# Patient Record
Sex: MALE | Race: WHITE | NOT HISPANIC OR LATINO | Employment: OTHER | URBAN - NONMETROPOLITAN AREA
[De-identification: names, ages, dates, MRNs, and addresses within clinical notes are randomized per-mention and may not be internally consistent; named-entity substitution may affect disease eponyms.]

---

## 2019-06-15 ENCOUNTER — HOSPITAL ENCOUNTER (INPATIENT)
Facility: HOSPITAL | Age: 79
LOS: 9 days | Discharge: NURSING, REHAB OR LONG-TERM CARE | End: 2019-06-24
Attending: NEUROLOGICAL SURGERY | Admitting: NEUROLOGICAL SURGERY

## 2019-06-15 ENCOUNTER — PREP FOR SURGERY (OUTPATIENT)
Dept: OTHER | Facility: HOSPITAL | Age: 79
End: 2019-06-15

## 2019-06-15 DIAGNOSIS — S06.5XAA BILATERAL SUBDURAL HEMATOMAS (HCC): Primary | ICD-10-CM

## 2019-06-15 DIAGNOSIS — Z78.9 DECREASED ACTIVITIES OF DAILY LIVING (ADL): ICD-10-CM

## 2019-06-15 DIAGNOSIS — Z74.09 IMPAIRED PHYSICAL MOBILITY: ICD-10-CM

## 2019-06-15 LAB
ABO GROUP BLD: NORMAL
ALBUMIN SERPL-MCNC: 4.4 G/DL (ref 3.5–5)
ALBUMIN/GLOB SERPL: 1.5 G/DL (ref 1.1–2.5)
ALP SERPL-CCNC: 85 U/L (ref 24–120)
ALT SERPL W P-5'-P-CCNC: 23 U/L (ref 0–54)
ANION GAP SERPL CALCULATED.3IONS-SCNC: 10 MMOL/L (ref 4–13)
APTT PPP: 31.5 SECONDS (ref 24.1–35)
AST SERPL-CCNC: 31 U/L (ref 7–45)
BILIRUB SERPL-MCNC: 0.6 MG/DL (ref 0.1–1)
BLD GP AB SCN SERPL QL: NEGATIVE
BUN BLD-MCNC: 18 MG/DL (ref 5–21)
BUN/CREAT SERPL: 27.7 (ref 7–25)
CALCIUM SPEC-SCNC: 9.2 MG/DL (ref 8.4–10.4)
CHLORIDE SERPL-SCNC: 94 MMOL/L (ref 98–110)
CO2 SERPL-SCNC: 26 MMOL/L (ref 24–31)
CREAT BLD-MCNC: 0.65 MG/DL (ref 0.5–1.4)
DEPRECATED RDW RBC AUTO: 43.1 FL (ref 40–54)
ERYTHROCYTE [DISTWIDTH] IN BLOOD BY AUTOMATED COUNT: 13.2 % (ref 12–15)
GFR SERPL CREATININE-BSD FRML MDRD: 118 ML/MIN/1.73
GLOBULIN UR ELPH-MCNC: 3 GM/DL
GLUCOSE BLD-MCNC: 120 MG/DL (ref 70–100)
HCT VFR BLD AUTO: 44.2 % (ref 40–52)
HGB BLD-MCNC: 14.6 G/DL (ref 14–18)
INR PPP: 1.1 (ref 0.91–1.09)
MCH RBC QN AUTO: 29.5 PG (ref 28–32)
MCHC RBC AUTO-ENTMCNC: 33 G/DL (ref 33–36)
MCV RBC AUTO: 89.3 FL (ref 82–95)
PLATELET # BLD AUTO: 223 10*3/MM3 (ref 130–400)
PMV BLD AUTO: 8.9 FL (ref 6–12)
POTASSIUM BLD-SCNC: 4.7 MMOL/L (ref 3.5–5.3)
PROT SERPL-MCNC: 7.4 G/DL (ref 6.3–8.7)
PROTHROMBIN TIME: 14.6 SECONDS (ref 11.9–14.6)
RBC # BLD AUTO: 4.95 10*6/MM3 (ref 4.8–5.9)
RH BLD: POSITIVE
SODIUM BLD-SCNC: 130 MMOL/L (ref 135–145)
T&S EXPIRATION DATE: NORMAL
WBC NRBC COR # BLD: 9.42 10*3/MM3 (ref 4.8–10.8)

## 2019-06-15 PROCEDURE — 86901 BLOOD TYPING SEROLOGIC RH(D): CPT | Performed by: NEUROLOGICAL SURGERY

## 2019-06-15 PROCEDURE — 99223 1ST HOSP IP/OBS HIGH 75: CPT | Performed by: NEUROLOGICAL SURGERY

## 2019-06-15 PROCEDURE — 80053 COMPREHEN METABOLIC PANEL: CPT | Performed by: NEUROLOGICAL SURGERY

## 2019-06-15 PROCEDURE — 94799 UNLISTED PULMONARY SVC/PX: CPT

## 2019-06-15 PROCEDURE — 86850 RBC ANTIBODY SCREEN: CPT | Performed by: NEUROLOGICAL SURGERY

## 2019-06-15 PROCEDURE — 85610 PROTHROMBIN TIME: CPT | Performed by: NEUROLOGICAL SURGERY

## 2019-06-15 PROCEDURE — 25010000002 MORPHINE PER 10 MG: Performed by: NEUROLOGICAL SURGERY

## 2019-06-15 PROCEDURE — 85730 THROMBOPLASTIN TIME PARTIAL: CPT | Performed by: NEUROLOGICAL SURGERY

## 2019-06-15 PROCEDURE — 86900 BLOOD TYPING SEROLOGIC ABO: CPT | Performed by: NEUROLOGICAL SURGERY

## 2019-06-15 PROCEDURE — 85027 COMPLETE CBC AUTOMATED: CPT | Performed by: NEUROLOGICAL SURGERY

## 2019-06-15 PROCEDURE — 25010000002 ONDANSETRON PER 1 MG: Performed by: NEUROLOGICAL SURGERY

## 2019-06-15 RX ORDER — SODIUM CHLORIDE 0.9 % (FLUSH) 0.9 %
3 SYRINGE (ML) INJECTION EVERY 12 HOURS SCHEDULED
Status: DISCONTINUED | OUTPATIENT
Start: 2019-06-15 | End: 2019-06-24 | Stop reason: HOSPADM

## 2019-06-15 RX ORDER — SODIUM CHLORIDE 0.9 % (FLUSH) 0.9 %
3-10 SYRINGE (ML) INJECTION AS NEEDED
Status: DISCONTINUED | OUTPATIENT
Start: 2019-06-15 | End: 2019-06-24 | Stop reason: HOSPADM

## 2019-06-15 RX ORDER — ONDANSETRON 2 MG/ML
4 INJECTION INTRAMUSCULAR; INTRAVENOUS EVERY 6 HOURS PRN
Status: DISCONTINUED | OUTPATIENT
Start: 2019-06-15 | End: 2019-06-24 | Stop reason: HOSPADM

## 2019-06-15 RX ORDER — MORPHINE SULFATE 2 MG/ML
2 INJECTION, SOLUTION INTRAMUSCULAR; INTRAVENOUS EVERY 4 HOURS PRN
Status: DISCONTINUED | OUTPATIENT
Start: 2019-06-15 | End: 2019-06-17

## 2019-06-15 RX ORDER — HYDROCODONE BITARTRATE AND ACETAMINOPHEN 5; 325 MG/1; MG/1
1 TABLET ORAL EVERY 6 HOURS PRN
Status: DISCONTINUED | OUTPATIENT
Start: 2019-06-15 | End: 2019-06-17

## 2019-06-15 RX ADMIN — SODIUM CHLORIDE, PRESERVATIVE FREE 3 ML: 5 INJECTION INTRAVENOUS at 14:00

## 2019-06-15 RX ADMIN — HYDROCODONE BITARTRATE AND ACETAMINOPHEN 1 TABLET: 5; 325 TABLET ORAL at 13:12

## 2019-06-15 RX ADMIN — MORPHINE SULFATE 2 MG: 2 INJECTION, SOLUTION INTRAMUSCULAR; INTRAVENOUS at 18:30

## 2019-06-15 RX ADMIN — ONDANSETRON 4 MG: 2 SOLUTION INTRAMUSCULAR; INTRAVENOUS at 21:22
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
## 2019-06-16 ENCOUNTER — ANESTHESIA (OUTPATIENT)
Dept: PERIOP | Facility: HOSPITAL | Age: 79
End: 2019-06-16

## 2019-06-16 ENCOUNTER — ANESTHESIA EVENT (OUTPATIENT)
Dept: PERIOP | Facility: HOSPITAL | Age: 79
End: 2019-06-16

## 2019-06-16 PROCEDURE — 25010000002 ONDANSETRON PER 1 MG: Performed by: NEUROLOGICAL SURGERY

## 2019-06-16 PROCEDURE — 25010000002 MIDAZOLAM PER 1 MG

## 2019-06-16 PROCEDURE — C1713 ANCHOR/SCREW BN/BN,TIS/BN: HCPCS | Performed by: NEUROLOGICAL SURGERY

## 2019-06-16 PROCEDURE — 93005 ELECTROCARDIOGRAM TRACING: CPT | Performed by: NURSE ANESTHETIST, CERTIFIED REGISTERED

## 2019-06-16 PROCEDURE — 25010000002 DEXAMETHASONE PER 1 MG: Performed by: NURSE ANESTHETIST, CERTIFIED REGISTERED

## 2019-06-16 PROCEDURE — 25010000002 PROPOFOL 10 MG/ML EMULSION: Performed by: NURSE ANESTHETIST, CERTIFIED REGISTERED

## 2019-06-16 PROCEDURE — 99024 POSTOP FOLLOW-UP VISIT: CPT | Performed by: NEUROLOGICAL SURGERY

## 2019-06-16 PROCEDURE — 25010000003 CEFAZOLIN PER 500 MG: Performed by: NURSE ANESTHETIST, CERTIFIED REGISTERED

## 2019-06-16 PROCEDURE — 25010000002 MORPHINE PER 10 MG: Performed by: NEUROLOGICAL SURGERY

## 2019-06-16 PROCEDURE — 25010000002 NEOSTIGMINE PER 0.5 MG: Performed by: NURSE ANESTHETIST, CERTIFIED REGISTERED

## 2019-06-16 PROCEDURE — 25010000002 ONDANSETRON PER 1 MG: Performed by: NURSE ANESTHETIST, CERTIFIED REGISTERED

## 2019-06-16 PROCEDURE — 61312 CRNEC/CRNOT STTL XDRL/SDRL: CPT | Performed by: NEUROLOGICAL SURGERY

## 2019-06-16 PROCEDURE — 93010 ELECTROCARDIOGRAM REPORT: CPT | Performed by: INTERNAL MEDICINE

## 2019-06-16 PROCEDURE — 00C40ZZ EXTIRPATION OF MATTER FROM INTRACRANIAL SUBDURAL SPACE, OPEN APPROACH: ICD-10-PCS | Performed by: NEUROLOGICAL SURGERY

## 2019-06-16 DEVICE — IMPLANTABLE DEVICE
Type: IMPLANTABLE DEVICE | Status: FUNCTIONAL
Brand: THINFLAP SYSTEM

## 2019-06-16 DEVICE — IMPLANTABLE DEVICE
Type: IMPLANTABLE DEVICE | Status: FUNCTIONAL
Brand: THINFLAP

## 2019-06-16 DEVICE — DURAGEN® PLUS DURAL REGENERATION MATRIX, 3 IN X 3 IN (7.5 CM X 7.5 CM)
Type: IMPLANTABLE DEVICE | Status: FUNCTIONAL
Brand: DURAGEN® PLUS

## 2019-06-16 RX ORDER — FLUMAZENIL 0.1 MG/ML
0.2 INJECTION INTRAVENOUS AS NEEDED
Status: DISCONTINUED | OUTPATIENT
Start: 2019-06-16 | End: 2019-06-16 | Stop reason: HOSPADM

## 2019-06-16 RX ORDER — MIDAZOLAM HYDROCHLORIDE 1 MG/ML
INJECTION INTRAMUSCULAR; INTRAVENOUS
Status: COMPLETED
Start: 2019-06-16 | End: 2019-06-16

## 2019-06-16 RX ORDER — NICARDIPINE HYDROCHLORIDE 2.5 MG/ML
INJECTION INTRAVENOUS AS NEEDED
Status: DISCONTINUED | OUTPATIENT
Start: 2019-06-16 | End: 2019-06-16 | Stop reason: SURG

## 2019-06-16 RX ORDER — PROPOFOL 10 MG/ML
VIAL (ML) INTRAVENOUS AS NEEDED
Status: DISCONTINUED | OUTPATIENT
Start: 2019-06-16 | End: 2019-06-16 | Stop reason: SURG

## 2019-06-16 RX ORDER — GLYCOPYRROLATE 0.2 MG/ML
INJECTION INTRAMUSCULAR; INTRAVENOUS AS NEEDED
Status: DISCONTINUED | OUTPATIENT
Start: 2019-06-16 | End: 2019-06-16 | Stop reason: SURG

## 2019-06-16 RX ORDER — MORPHINE SULFATE 2 MG/ML
2 INJECTION, SOLUTION INTRAMUSCULAR; INTRAVENOUS
Status: DISCONTINUED | OUTPATIENT
Start: 2019-06-16 | End: 2019-06-16 | Stop reason: HOSPADM

## 2019-06-16 RX ORDER — ROCURONIUM BROMIDE 10 MG/ML
INJECTION, SOLUTION INTRAVENOUS AS NEEDED
Status: DISCONTINUED | OUTPATIENT
Start: 2019-06-16 | End: 2019-06-16 | Stop reason: SURG

## 2019-06-16 RX ORDER — LORAZEPAM 2 MG/ML
INJECTION INTRAMUSCULAR
Status: DISPENSED
Start: 2019-06-16 | End: 2019-06-16

## 2019-06-16 RX ORDER — MAGNESIUM HYDROXIDE 1200 MG/15ML
LIQUID ORAL AS NEEDED
Status: DISCONTINUED | OUTPATIENT
Start: 2019-06-16 | End: 2019-06-16 | Stop reason: HOSPADM

## 2019-06-16 RX ORDER — BUPIVACAINE HYDROCHLORIDE AND EPINEPHRINE 2.5; 5 MG/ML; UG/ML
INJECTION, SOLUTION INFILTRATION; PERINEURAL AS NEEDED
Status: DISCONTINUED | OUTPATIENT
Start: 2019-06-16 | End: 2019-06-16 | Stop reason: HOSPADM

## 2019-06-16 RX ORDER — ONDANSETRON 2 MG/ML
INJECTION INTRAMUSCULAR; INTRAVENOUS AS NEEDED
Status: DISCONTINUED | OUTPATIENT
Start: 2019-06-16 | End: 2019-06-16 | Stop reason: SURG

## 2019-06-16 RX ORDER — NALOXONE HCL 0.4 MG/ML
0.04 VIAL (ML) INJECTION AS NEEDED
Status: DISCONTINUED | OUTPATIENT
Start: 2019-06-16 | End: 2019-06-16 | Stop reason: HOSPADM

## 2019-06-16 RX ORDER — LORAZEPAM 2 MG/ML
0.5 INJECTION INTRAMUSCULAR ONCE
Status: DISCONTINUED | OUTPATIENT
Start: 2019-06-16 | End: 2019-06-24 | Stop reason: HOSPADM

## 2019-06-16 RX ORDER — BACITRACIN 50000 [IU]/1
INJECTION, POWDER, FOR SOLUTION INTRAMUSCULAR
Status: DISPENSED
Start: 2019-06-16 | End: 2019-06-16

## 2019-06-16 RX ORDER — MIDAZOLAM HYDROCHLORIDE 1 MG/ML
1 INJECTION INTRAMUSCULAR; INTRAVENOUS ONCE
Status: COMPLETED | OUTPATIENT
Start: 2019-06-16 | End: 2019-06-16

## 2019-06-16 RX ORDER — METOCLOPRAMIDE HYDROCHLORIDE 5 MG/ML
5 INJECTION INTRAMUSCULAR; INTRAVENOUS
Status: DISCONTINUED | OUTPATIENT
Start: 2019-06-16 | End: 2019-06-16 | Stop reason: HOSPADM

## 2019-06-16 RX ORDER — ONDANSETRON 2 MG/ML
4 INJECTION INTRAMUSCULAR; INTRAVENOUS AS NEEDED
Status: DISCONTINUED | OUTPATIENT
Start: 2019-06-16 | End: 2019-06-16 | Stop reason: HOSPADM

## 2019-06-16 RX ORDER — SODIUM CHLORIDE, SODIUM LACTATE, POTASSIUM CHLORIDE, CALCIUM CHLORIDE 600; 310; 30; 20 MG/100ML; MG/100ML; MG/100ML; MG/100ML
INJECTION, SOLUTION INTRAVENOUS CONTINUOUS PRN
Status: DISCONTINUED | OUTPATIENT
Start: 2019-06-16 | End: 2019-06-16 | Stop reason: SURG

## 2019-06-16 RX ORDER — IPRATROPIUM BROMIDE AND ALBUTEROL SULFATE 2.5; .5 MG/3ML; MG/3ML
3 SOLUTION RESPIRATORY (INHALATION) ONCE AS NEEDED
Status: DISCONTINUED | OUTPATIENT
Start: 2019-06-16 | End: 2019-06-16 | Stop reason: HOSPADM

## 2019-06-16 RX ORDER — PHENYLEPHRINE HCL IN 0.9% NACL 0.8MG/10ML
SYRINGE (ML) INTRAVENOUS AS NEEDED
Status: DISCONTINUED | OUTPATIENT
Start: 2019-06-16 | End: 2019-06-16 | Stop reason: SURG

## 2019-06-16 RX ORDER — SUFENTANIL CITRATE 50 UG/ML
INJECTION EPIDURAL; INTRAVENOUS AS NEEDED
Status: DISCONTINUED | OUTPATIENT
Start: 2019-06-16 | End: 2019-06-16 | Stop reason: SURG

## 2019-06-16 RX ORDER — VASOPRESSIN 20 U/ML
INJECTION PARENTERAL AS NEEDED
Status: DISCONTINUED | OUTPATIENT
Start: 2019-06-16 | End: 2019-06-16 | Stop reason: SURG

## 2019-06-16 RX ORDER — DEXAMETHASONE SODIUM PHOSPHATE 4 MG/ML
INJECTION, SOLUTION INTRA-ARTICULAR; INTRALESIONAL; INTRAMUSCULAR; INTRAVENOUS; SOFT TISSUE AS NEEDED
Status: DISCONTINUED | OUTPATIENT
Start: 2019-06-16 | End: 2019-06-16 | Stop reason: SURG

## 2019-06-16 RX ORDER — CEFAZOLIN SODIUM 1 G/3ML
INJECTION, POWDER, FOR SOLUTION INTRAMUSCULAR; INTRAVENOUS AS NEEDED
Status: DISCONTINUED | OUTPATIENT
Start: 2019-06-16 | End: 2019-06-16 | Stop reason: SURG

## 2019-06-16 RX ORDER — FENTANYL CITRATE 50 UG/ML
25 INJECTION, SOLUTION INTRAMUSCULAR; INTRAVENOUS AS NEEDED
Status: DISCONTINUED | OUTPATIENT
Start: 2019-06-16 | End: 2019-06-16 | Stop reason: HOSPADM

## 2019-06-16 RX ORDER — EPHEDRINE SULFATE 50 MG/ML
INJECTION INTRAVENOUS AS NEEDED
Status: DISCONTINUED | OUTPATIENT
Start: 2019-06-16 | End: 2019-06-16 | Stop reason: SURG

## 2019-06-16 RX ORDER — OXYCODONE AND ACETAMINOPHEN 10; 325 MG/1; MG/1
1 TABLET ORAL ONCE AS NEEDED
Status: DISCONTINUED | OUTPATIENT
Start: 2019-06-16 | End: 2019-06-16 | Stop reason: HOSPADM

## 2019-06-16 RX ORDER — SODIUM CHLORIDE 9 MG/ML
INJECTION, SOLUTION INTRAVENOUS AS NEEDED
Status: DISCONTINUED | OUTPATIENT
Start: 2019-06-16 | End: 2019-06-16 | Stop reason: HOSPADM

## 2019-06-16 RX ORDER — LABETALOL HYDROCHLORIDE 5 MG/ML
5 INJECTION, SOLUTION INTRAVENOUS
Status: DISCONTINUED | OUTPATIENT
Start: 2019-06-16 | End: 2019-06-16 | Stop reason: HOSPADM

## 2019-06-16 RX ORDER — HYDRALAZINE HYDROCHLORIDE 20 MG/ML
5 INJECTION INTRAMUSCULAR; INTRAVENOUS
Status: DISCONTINUED | OUTPATIENT
Start: 2019-06-16 | End: 2019-06-16 | Stop reason: HOSPADM

## 2019-06-16 RX ADMIN — SUFENTANIL CITRATE 10 MCG: 50 INJECTION, SOLUTION EPIDURAL; INTRAVENOUS at 09:41

## 2019-06-16 RX ADMIN — VASOPRESSIN 0.5 UNITS: 20 INJECTION INTRAVENOUS at 08:36

## 2019-06-16 RX ADMIN — GLYCOPYRROLATE 0.3 MG: 0.2 INJECTION, SOLUTION INTRAMUSCULAR; INTRAVENOUS at 09:50

## 2019-06-16 RX ADMIN — PROPOFOL 80 MG: 10 INJECTION, EMULSION INTRAVENOUS at 08:08

## 2019-06-16 RX ADMIN — NICARDIPINE HYDROCHLORIDE 500 MCG: 25 INJECTION INTRAVENOUS at 09:56

## 2019-06-16 RX ADMIN — SODIUM CHLORIDE, POTASSIUM CHLORIDE, SODIUM LACTATE AND CALCIUM CHLORIDE: 600; 310; 30; 20 INJECTION, SOLUTION INTRAVENOUS at 08:02

## 2019-06-16 RX ADMIN — Medication 3 MG: at 09:50

## 2019-06-16 RX ADMIN — HYDROCODONE BITARTRATE AND ACETAMINOPHEN 1 TABLET: 5; 325 TABLET ORAL at 14:30

## 2019-06-16 RX ADMIN — NICARDIPINE HYDROCHLORIDE 500 MCG: 25 INJECTION INTRAVENOUS at 09:58

## 2019-06-16 RX ADMIN — HYDROCODONE BITARTRATE AND ACETAMINOPHEN 1 TABLET: 5; 325 TABLET ORAL at 20:39

## 2019-06-16 RX ADMIN — VASOPRESSIN 0.5 UNITS: 20 INJECTION INTRAVENOUS at 08:30

## 2019-06-16 RX ADMIN — MIDAZOLAM HYDROCHLORIDE 1 MG: 1 INJECTION INTRAMUSCULAR; INTRAVENOUS at 10:40

## 2019-06-16 RX ADMIN — MORPHINE SULFATE 2 MG: 2 INJECTION, SOLUTION INTRAMUSCULAR; INTRAVENOUS at 23:50

## 2019-06-16 RX ADMIN — CEFAZOLIN 2 G: 1 INJECTION, POWDER, FOR SOLUTION INTRAVENOUS at 08:33

## 2019-06-16 RX ADMIN — Medication 80 MCG: at 08:36

## 2019-06-16 RX ADMIN — EPHEDRINE SULFATE 15 MG: 50 INJECTION INTRAVENOUS at 08:42

## 2019-06-16 RX ADMIN — MIDAZOLAM 1 MG: 1 INJECTION INTRAMUSCULAR; INTRAVENOUS at 10:40

## 2019-06-16 RX ADMIN — ONDANSETRON HYDROCHLORIDE 4 MG: 2 SOLUTION INTRAMUSCULAR; INTRAVENOUS at 08:20

## 2019-06-16 RX ADMIN — SUFENTANIL CITRATE 10 MCG: 50 INJECTION, SOLUTION EPIDURAL; INTRAVENOUS at 09:25

## 2019-06-16 RX ADMIN — ROCURONIUM BROMIDE 40 MG: 10 INJECTION INTRAVENOUS at 08:08

## 2019-06-16 RX ADMIN — ONDANSETRON 4 MG: 2 SOLUTION INTRAMUSCULAR; INTRAVENOUS at 02:47

## 2019-06-16 RX ADMIN — PROPOFOL 20 MG: 10 INJECTION, EMULSION INTRAVENOUS at 10:05

## 2019-06-16 RX ADMIN — MORPHINE SULFATE 2 MG: 2 INJECTION, SOLUTION INTRAMUSCULAR; INTRAVENOUS at 13:09

## 2019-06-16 RX ADMIN — MORPHINE SULFATE 2 MG: 2 INJECTION, SOLUTION INTRAMUSCULAR; INTRAVENOUS at 17:25

## 2019-06-16 RX ADMIN — SUFENTANIL CITRATE 30 MCG: 50 INJECTION, SOLUTION EPIDURAL; INTRAVENOUS at 08:08

## 2019-06-16 RX ADMIN — HYDROCODONE BITARTRATE AND ACETAMINOPHEN 1 TABLET: 5; 325 TABLET ORAL at 02:47

## 2019-06-16 RX ADMIN — DEXAMETHASONE SODIUM PHOSPHATE 8 MG: 4 INJECTION, SOLUTION INTRAMUSCULAR; INTRAVENOUS at 08:20

## 2019-06-16 NOTE — ANESTHESIA PROCEDURE NOTES
Arterial Line      Patient location during procedure: OR   Performed By   CRNA: David Stern CRNA  Preanesthetic Checklist  Completed: patient identified, site marked, surgical consent, pre-op evaluation, timeout performed, IV checked, risks and benefits discussed and monitors and equipment checked  Arterial Line Prep   Sterile Tech: gloves and sterile barriers  Prep: alcohol swabs  Arterial Line Procedure   Laterality:left  Location:  radial artery  Catheter size: 20 G   Guidance: ultrasound guided  PROCEDURE NOTE/ULTRASOUND INTERPRETATION.  Using ultrasound guidance the potential vascular sites for insertion of the catheter were visualized to determine the patency of the vessel to be used for vascular access.  After selecting the appropriate site for insertion, the needle was visualized under ultrasound being inserted into the radial artery, followed by ultrasound confirmation of wire and catheter placement. There were no abnormalities seen on ultrasound; an image was taken; and the patient tolerated the procedure with no complications.   Number of attempts: 1  Successful placement: yes          Post Assessment   Dressing Type: occlusive dressing applied, secured with tape and wrist guard applied.   Complications no  Circ/Move/Sens Assessment: normal.   Patient Tolerance: patient tolerated the procedure well with no apparent complications

## 2019-06-16 NOTE — ANESTHESIA POSTPROCEDURE EVALUATION
Patient: Madi Mancini    Procedure Summary     Date:  06/16/19 Room / Location:  [facility] OR  /  [facility] OR    Anesthesia Start:  0802 Anesthesia Stop:  1008    Procedure:  CRANIOTOMY FOR SUBDURAL HEMATOMA (Bilateral Head) Diagnosis:       Bilateral subdural hematomas (CMS/HCC)      (Bilateral subdural hematomas (CMS/HCC) [S06.5X9A])    Surgeon:  Galen Bhat MD Provider:  David Stern CRNA    Anesthesia Type:  general ASA Status:  3 - Emergent          Anesthesia Type: general  Last vitals  BP   112/56 (06/16/19 1020)   Temp   98.9 °F (37.2 °C) (06/16/19 1005)   Pulse   84 (06/16/19 1020)   Resp   20 (06/16/19 1020)     SpO2   95 % (06/16/19 1020)     Post Anesthesia Care and Evaluation    Patient location during evaluation: PACU  Patient participation: complete - patient participated  Level of consciousness: awake and alert  Pain score: 0  Pain management: adequate  Airway patency: patent  Anesthetic complications: No anesthetic complications  PONV Status: none  Cardiovascular status: acceptable and stable  Respiratory status: acceptable and unassisted  Hydration status: acceptable    Comments: Pt seen by anesthesia in PACU and spoke with RN, ok to go to ICU, verbal orders. Pt still confused but VS normal and no neuro deficits.

## 2019-06-16 NOTE — OP NOTE
Procedure Note  Preop Diagnosis: Bilateral subdural hematomas (CMS/HCC) [S06.5X9A]    Post-Op Diagnosis Codes:     * Bilateral subdural hematomas (CMS/HCC) [S06.5X9A]     Procedure Name: bilateral frontal craniotomy    Indications:  A CT revealed findings of bilateral frontal. The patient now presents for bilateral frontal craniotomy after discussing therapeutic alternatives.          Surgeon: Galen Bhat MD     Assistants: none    Anesthesia: General endotracheal anesthesia    ASA Class: 3    Procedure Details   After obtaining informed consent, having the risks and benefits of the procedure explained including but not limited to infection, bleeding, paralysis, spinal fluid leak, speech and memory problems, in addition we specifically discussed the possibility of a second surgery, stroke, coma, and death.  The patient was brought to the operating.  The patient was given general anesthesia via an endotracheal tube.  The patient was laid supine on operating table.  The head wa skept midline and flexed on a foam doughnut.  The right and left  frontal area was then marked with indelible marker for a preplanned incision and a small amount of hair was removed using electric clippers.  The patient was a prepped and draped in standard sterile fashion.  The preplanned incision was infiltrated Marcaine and epinephrine.  A 10 blade scalpel was used to make an incision at the dermis epidermis on the left and right.  Bovie cautery was used to extend the incision to the subcutaneous and soft tissues to love the paracranium.  Isidro clips were then applied to the skin edges.  2 cerebellar retractors were placed into the left and right  wound.  A craniotome was then used to drill a bur hole on the left and right.  A dural separator was then used to separate the dura from the inner table of the skull.  A side cutting bur with footplate was used to turn a small cranial flap on the left and right.  This flaps were then placed  into a antibiotic solution.  The dura was then opened in a horseshoe shaped fashion using dural scissors, first on the right then left.  Subacute and chronic  subdural fluid immediately came out under high pressure on the left and right.The subdural space was then evacuated using warm normal saline.  A 10 Guinean round drain was placed into the subdural space and anchored to the skin using a 4-0 Monocryl.  The dura was then reapproximated using a series of inverted interrupted 4-0 Nurolon's.  A large piece of DuraGen was placed over the dural incisions.  The bone flaps were then replaced and held in place with bur hole covers and mini screws.  The entire wound was then copiously irrigated with antibiotic solution.  It was inspected for hemostasis.  The galea was then reapproximated using a series of interrupted 2-0 Vicryl sutures.  The skin was closed using running 4-0 Monocryl.  All sponge needle and instrument counts were correct at the end of the procedure.  Patient was extubated in stable condition returned to recovery with about 20 cc of blood loss.        Findings:  Subdural Hematoma    Estimated Blood Loss:  20           Drains: bilateral subdural drains           Total IV Fluids: ml           Specimens: None           Implants:   Implant Name Type Inv. Item Serial No.  Lot No. LRB No. Used   DURALMATRIX DURAGEN PLS 3X3IN EA/5 - [id number] Implant DURALMATRIX DURAGEN PLS 3X3IN EA/5  [employer] [id number]  1              Complications:  none           Disposition: PACU - hemodynamically stable.           Condition: stable        Galen Bhat MD

## 2019-06-16 NOTE — PROGRESS NOTES
Continued Stay Note   [facility]     Patient Name: Madi Mancini  MRN: [id number]  Today's Date: 6/16/2019    Admit Date: 6/15/2019    Discharge Plan     Row Name 06/16/19 1107       Plan    Plan Comments  SW attempted completing assessment. Pt is in OR. SW will try again at a more appropriate time.         Discharge Codes    No documentation.             Coni Barreto

## 2019-06-16 NOTE — ANESTHESIA PREPROCEDURE EVALUATION
Anesthesia Evaluation     Patient summary reviewed   NPO Solid Status: > 8 hours  NPO Liquid Status: > 8 hours           Airway   Mallampati: III  TM distance: >3 FB  Neck ROM: full  No difficulty expected  Dental    (+) edentulous    Pulmonary - negative pulmonary ROS and normal exam   Cardiovascular - normal exam  Exercise tolerance: good (4-7 METS)    ECG reviewed    (+) hypertension well controlled 2 medications or greater, hyperlipidemia,       Neuro/Psych- negative ROS  GI/Hepatic/Renal/Endo    (+) obesity,       Musculoskeletal (-) negative ROS    Abdominal    Substance History - negative use     OB/GYN negative ob/gyn ROS         Other - negative ROS                     Anesthesia Plan    ASA 3 - emergent     general   (Bilateral subdural hematoma, pt is a & o x 3)  intravenous induction   Anesthetic plan, all risks, benefits, and alternatives have been provided, discussed and informed consent has been obtained with: patient.

## 2019-06-16 NOTE — NURSING NOTE
"Pt confused wanting to get up \"I have to get up\" I have to go pee\" pt extremely strong no weakness noted assistance called for from the OR- 3 OR staff members here to help  CRNA placed order for versed to give Pt almost pulled mcmillan catheter out but did pull IV out to left forearm . A-line remains intact   "

## 2019-06-16 NOTE — PROGRESS NOTES
"Progress Note    Patient:  Madi Mancini  YOB: 1940  MRN: [id number]   Admit date: 6/15/2019   Admitting Physician: Galen Bhat MD  Primary Care Physician: Provider, No Known    Chief Complaint/Interval History: Subdural hematoma.  Headache overnight.  Nauseous at times.    Intake/Output Summary (Last 24 hours) at 6/16/2019 0706  Last data filed at 6/16/2019 0354  Gross per 24 hour   Intake 480 ml   Output 1500 ml   Net -1020 ml     Allergies:   Allergies   Allergen Reactions   • Penicillins Other (See Comments)     Causes mouth to be sore     Current Scheduled Medications:     sodium chloride 3 mL Intravenous Q12H     Current PRN Medications:  •  HYDROcodone-acetaminophen  •  Morphine  •  ondansetron  •  sodium chloride    Review of Systems   Gastrointestinal: Positive for nausea.   Neurological: Positive for headaches.   All other systems reviewed and are negative.      Vital Signs:  /77   Pulse 66   Temp 98.5 °F (36.9 °C) (Oral)   Resp 20   Ht 147.3 cm (58\")   Wt 106 kg (234 lb)   SpO2 97%   BMI 48.91 kg/m²     Physical Exam   Constitutional: He is oriented to person, place, and time. He appears well-developed and well-nourished.   HENT:   Head: Normocephalic and atraumatic.   Right Ear: Hearing normal.   Left Ear: Hearing normal.   Eyes: EOM are normal. Pupils are equal, round, and reactive to light.   Neck: Normal range of motion.   Cardiovascular: Normal rate and regular rhythm.   Pulmonary/Chest: Effort normal. No respiratory distress.   Abdominal: Soft. He exhibits no distension. There is no tenderness.   Neurological: He is alert and oriented to person, place, and time. He has normal strength and normal reflexes. No cranial nerve deficit or sensory deficit. He displays a negative Romberg sign. GCS eye subscore is 4. GCS verbal subscore is 5. GCS motor subscore is 6. He displays no Babinski's sign on the right side. He displays no Babinski's sign on the left side. " "  Psychiatric: His speech is normal. Judgment normal. Cognition and memory are normal.     Vital signs reviewed.  Line/IV site: No erythema, warmth, induration, or tenderness.    Objective:  Vital signs: (most recent): Blood pressure 130/77, pulse 66, temperature 98.5 °F (36.9 °C), temperature source Oral, resp. rate 20, height 147.3 cm (58\"), weight 106 kg (234 lb), SpO2 97 %.    Lungs:  Normal effort.  He is not in respiratory distress.    Heart: Normal rate.  Regular rhythm.    Abdomen: Abdomen is soft and non-distended.        Neurologic Exam     Mental Status   Oriented to person, place, and time.   Speech: speech is normal     Cranial Nerves     CN III, IV, VI   Pupils are equal, round, and reactive to light.  Extraocular motions are normal.     Motor Exam     Strength   Strength 5/5 throughout.       Lab Results:  ABG:     CBC: Results from last 7 days   Lab Units 06/15/19  1349   WBC 10*3/mm3 9.42   HEMOGLOBIN g/dL 14.6   HEMATOCRIT % 44.2   PLATELETS 10*3/mm3 223     BMP:  Results from last 7 days   Lab Units 06/15/19  1349   SODIUM mmol/L 130*   POTASSIUM mmol/L 4.7   CHLORIDE mmol/L 94*   CO2 mmol/L 26.0   BUN mg/dL 18   CREATININE mg/dL 0.65   GLUCOSE mg/dL 120*   CALCIUM mg/dL 9.2   ALT (SGPT) U/L 23     Culture Results:        Bilateral subdural hematomas (CMS/HCC)      Radiology:     Additional Studies Reviewed:     Impression/Recommendations:   CV: Heart rate and blood pressure stable overnight  RESP: Oxygenating well  GI: Nausea  : BUN/creatinine stable  NEURO: Grossly nonfocal neurologic exam.  Plan operative drainage of subdural hematomas today.  ID:  Galen Bhat MD    "

## 2019-06-16 NOTE — ANESTHESIA PROCEDURE NOTES
Airway  Airway not difficult    General Information and Staff    Patient location during procedure: OR  CRNA: David Stern CRNA    Indications and Patient Condition  Indications for airway management: airway protection    Preoxygenated: yes  Mask difficulty assessment: 0 - not attempted    Final Airway Details  Final airway type: endotracheal airway      Successful airway: ETT  Cuffed: yes   Successful intubation technique: direct laryngoscopy  Endotracheal tube insertion site: oral  Blade: Yolis  Blade size: 3  ETT size (mm): 7.5  Cormack-Lehane Classification: grade I - full view of glottis  Placement verified by: chest auscultation and capnometry   Cuff volume (mL): 4  Measured from: lips  ETT to lips (cm): 21  Number of attempts at approach: 1    Additional Comments  ATRAUMATIC INTUBATION

## 2019-06-16 NOTE — NURSING NOTE
Jarred CRNA here at bedside assessment completed pt orient to person and place speech clear  Jarred CRNA states okay to transfer pt back to ICU attempts made to notify Dr Bhat unable to reach him at this point in time.

## 2019-06-17 ENCOUNTER — APPOINTMENT (OUTPATIENT)
Dept: CT IMAGING | Facility: HOSPITAL | Age: 79
End: 2019-06-17

## 2019-06-17 PROCEDURE — 70450 CT HEAD/BRAIN W/O DYE: CPT

## 2019-06-17 PROCEDURE — 25010000002 MORPHINE PER 10 MG: Performed by: NEUROLOGICAL SURGERY

## 2019-06-17 PROCEDURE — 99024 POSTOP FOLLOW-UP VISIT: CPT | Performed by: NURSE PRACTITIONER

## 2019-06-17 RX ORDER — LABETALOL HYDROCHLORIDE 5 MG/ML
10 INJECTION, SOLUTION INTRAVENOUS EVERY 4 HOURS PRN
Status: DISCONTINUED | OUTPATIENT
Start: 2019-06-17 | End: 2019-06-24 | Stop reason: HOSPADM

## 2019-06-17 RX ORDER — HYDROCODONE BITARTRATE AND ACETAMINOPHEN 10; 325 MG/1; MG/1
1 TABLET ORAL EVERY 6 HOURS PRN
Status: DISCONTINUED | OUTPATIENT
Start: 2019-06-17 | End: 2019-06-18

## 2019-06-17 RX ORDER — HYDRALAZINE HYDROCHLORIDE 20 MG/ML
10 INJECTION INTRAMUSCULAR; INTRAVENOUS EVERY 4 HOURS PRN
Status: DISCONTINUED | OUTPATIENT
Start: 2019-06-17 | End: 2019-06-24 | Stop reason: HOSPADM

## 2019-06-17 RX ORDER — MORPHINE SULFATE 2 MG/ML
2 INJECTION, SOLUTION INTRAMUSCULAR; INTRAVENOUS
Status: DISCONTINUED | OUTPATIENT
Start: 2019-06-17 | End: 2019-06-24 | Stop reason: RX

## 2019-06-17 RX ADMIN — SODIUM CHLORIDE, PRESERVATIVE FREE 3 ML: 5 INJECTION INTRAVENOUS at 09:10

## 2019-06-17 RX ADMIN — MORPHINE SULFATE 2 MG: 2 INJECTION, SOLUTION INTRAMUSCULAR; INTRAVENOUS at 05:45

## 2019-06-17 RX ADMIN — SODIUM CHLORIDE, PRESERVATIVE FREE 3 ML: 5 INJECTION INTRAVENOUS at 20:08

## 2019-06-17 RX ADMIN — HYDROCODONE BITARTRATE AND ACETAMINOPHEN 1 TABLET: 5; 325 TABLET ORAL at 04:29

## 2019-06-17 RX ADMIN — MORPHINE SULFATE 2 MG: 2 INJECTION, SOLUTION INTRAMUSCULAR; INTRAVENOUS at 09:10

## 2019-06-17 NOTE — SIGNIFICANT NOTE
Dr. Bhat present for exam.  He stated that the patient was OK as long as he wakes up and follows commands.

## 2019-06-17 NOTE — PROGRESS NOTES
Discharge Planning Assessment  [facility]     Patient Name: Madi Mancini  MRN: [id number]  Today's Date: 6/17/2019    Admit Date: 6/15/2019    Discharge Needs Assessment     Row Name 06/17/19 0931       Living Environment    Lives With  alone    Current Living Arrangements  home/apartment/condo    Primary Care Provided by  self    Provides Primary Care For  no one    Quality of Family Relationships  unable to assess    Able to Return to Prior Arrangements  yes       Resource/Environmental Concerns    Resource/Environmental Concerns  none       Transition Planning    Patient/Family Anticipates Transition to  home    Patient/Family Anticipated Services at Transition      Transportation Anticipated  family or friend will provide       Discharge Needs Assessment    Readmission Within the Last 30 Days  no previous admission in last 30 days    Concerns to be Addressed  discharge planning    Equipment Currently Used at Home  none    Equipment Needed After Discharge  none    Current Discharge Risk  lives alone    Discharge Coordination/Progress  Patient admitted from home, resides alone.  Patient was very independent prior to admission.  Patient has a PCP, Galen Mcgee, and RX coverage.  Patient may need rehab at discharge.  Will follow to determine plan/needs.        Discharge Plan    No documentation.       Destination      No service coordination in this encounter.      Durable Medical Equipment      No service coordination in this encounter.      Dialysis/Infusion      No service coordination in this encounter.      Home Medical Care      No service coordination in this encounter.      Therapy      No service coordination in this encounter.      Community Resources      No service coordination in this encounter.          Demographic Summary    No documentation.       Functional Status    No documentation.       Psychosocial    No documentation.       Abuse/Neglect    No documentation.       Legal    No  documentation.       Substance Abuse    No documentation.       Patient Forms    No documentation.           FABY BeltranW

## 2019-06-17 NOTE — PROGRESS NOTES
"Progress Note    Patient:  Madi Mancini  YOB: 1940  MRN: [id number]   Admit date: 6/15/2019   Admitting Physician: Galen Bhat MD  Primary Care Physician: Provider, No Known    Chief Complaint/Interval History: Occasional headaches.  Patient feels like he is doing better today.  No events overnight.  No seizures.  No mental status changes.  CT scan of the head was done this morning.  Drains in place    Intake/Output Summary (Last 24 hours) at 6/17/2019 0736  Last data filed at 6/17/2019 0400  Gross per 24 hour   Intake 1531.7 ml   Output 2035 ml   Net -503.3 ml     Allergies:   Allergies   Allergen Reactions   • Penicillins Other (See Comments)     Causes mouth to be sore     Current Scheduled Medications:     LORazepam 0.5 mg Intravenous Once   sodium chloride 3 mL Intravenous Q12H     Current PRN Medications:  •  HYDROcodone-acetaminophen  •  Morphine  •  ondansetron  •  sodium chloride    Review of Systems   Constitutional: Negative for fever.   Respiratory: Negative.    Cardiovascular: Negative.    Gastrointestinal: Negative.    Genitourinary: Negative.    Musculoskeletal: Negative.    Neurological: Positive for weakness and headaches. Negative for dizziness.   Psychiatric/Behavioral: Negative.        Vital Signs:  /80 (BP Location: Right arm, Patient Position: Lying)   Pulse 93   Temp 98.3 °F (36.8 °C) (Oral)   Resp 24   Ht 147.3 cm (58\")   Wt 107 kg (236 lb)   SpO2 96%   BMI 49.32 kg/m²     Physical Exam   Constitutional: He is oriented to person, place, and time. Vital signs are normal. He appears well-developed and well-nourished.   HENT:   Head: Normocephalic.   Eyes: EOM are normal. Pupils are equal, round, and reactive to light.   Neck: Normal range of motion.   Cardiovascular: Normal rate, regular rhythm and intact distal pulses.   Pulmonary/Chest: Effort normal. No respiratory distress.   Abdominal: Soft. Bowel sounds are normal. He exhibits no distension. " "  Musculoskeletal: Normal range of motion.   Neurological: He is alert and oriented to person, place, and time. He has normal strength and normal reflexes. No cranial nerve deficit or sensory deficit. Gait normal. GCS eye subscore is 4. GCS verbal subscore is 5. GCS motor subscore is 6.   Skin: Skin is warm and dry.   Psychiatric: He has a normal mood and affect. His speech is normal and behavior is normal. Thought content normal.     Vital signs reviewed.  Line/IV site: No erythema, warmth, induration, or tenderness.    Objective:  General Appearance:  Comfortable, well-appearing, in no acute distress and not in pain.    Vital signs: (most recent): Blood pressure 138/80, pulse 93, temperature 98.3 °F (36.8 °C), temperature source Oral, resp. rate 24, height 147.3 cm (58\"), weight 107 kg (236 lb), SpO2 96 %.  Vital signs are normal.  No fever.    Output: Producing urine.    HEENT: Normal HEENT exam.    Lungs:  Normal effort.  He is not in respiratory distress.    Heart: Normal rate.  Regular rhythm.    Chest: Symmetric chest wall expansion.   Extremities: Normal range of motion.    Skin:  Warm and dry.    Abdomen: Abdomen is soft and non-distended.  Bowel sounds are normal.   There is no abdominal tenderness.     Pulses: Distal pulses are intact.        Neurologic Exam     Mental Status   Oriented to person, place, and time.   Attention: normal. Concentration: normal.   Speech: speech is normal   Level of consciousness: alert  Knowledge: good.   Able to name object. Able to repeat. Able to write. Normal comprehension.     Cranial Nerves   Cranial nerves II through XII intact.     CN III, IV, VI   Pupils are equal, round, and reactive to light.  Extraocular motions are normal.     Motor Exam   Muscle bulk: normal    Strength   Strength 5/5 throughout.     Sensory Exam   Light touch normal.       Lab Results:  ABG:     CBC: Results from last 7 days   Lab Units 06/15/19  1349   WBC 10*3/mm3 9.42   HEMOGLOBIN g/dL 14.6 "   HEMATOCRIT % 44.2   PLATELETS 10*3/mm3 223     BMP:  Results from last 7 days   Lab Units 06/15/19  1349   SODIUM mmol/L 130*   POTASSIUM mmol/L 4.7   CHLORIDE mmol/L 94*   CO2 mmol/L 26.0   BUN mg/dL 18   CREATININE mg/dL 0.65   GLUCOSE mg/dL 120*   CALCIUM mg/dL 9.2   ALT (SGPT) U/L 23     Culture Results:        Bilateral subdural hematomas (CMS/HCC)      Radiology: CT scan of the head shows pneumocephalus present.  No evidence of acute bleeding.  Mild mass-effect no midline shift.      Impression/Recommendations:   CV: Heart rate and blood pressure stable  RESP: Oxygen level stable  GI: Adequate oral intake  : Adequate urine output  NEURO: Neuro exam stable  ID: None  ALONDRA Richardson

## 2019-06-17 NOTE — NURSING NOTE
Dr. Bhat paged about change in patient condition.  The patient is now confused and is very drowsy.  He will not make sense in conversation and falls asleep mid-conversation.  Dr. Bhat was notified about this and he gave no orders.  He stated we will see how he does and readdress as needed.

## 2019-06-17 NOTE — PLAN OF CARE
Problem: Patient Care Overview  Goal: Plan of Care Review  Outcome: Ongoing (interventions implemented as appropriate)   06/17/19 3711   Coping/Psychosocial   Plan of Care Reviewed With patient;father   Plan of Care Review   Progress declining   OTHER   Outcome Summary The patient has had a neurological deficit this PM where he is drowsy and will fall asleep during conversation. The patient will wake up and follow commands. He is not confused as well. Dr. Bhat is aware of this. The drains will not hold suction which Dr. Bhat is also aware. 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)  Goal: Signs and Symptoms of Listed Potential Problems Will be Absent, Minimized or Managed (Brain Injury, Moderate Traumatic)  Outcome: Ongoing (interventions implemented as appropriate)      Problem: Fall Risk (Adult)  Goal: Identify Related Risk Factors and Signs and Symptoms  Outcome: Outcome(s) achieved Date Met: 06/17/19    Goal: Absence of Fall  Outcome: Ongoing (interventions implemented as appropriate)      Problem: Skin Injury Risk (Adult)  Goal: Identify Related Risk Factors and Signs and Symptoms  Outcome: Outcome(s) achieved Date Met: 06/17/19    Goal: Skin Health and Integrity  Outcome: Ongoing (interventions implemented as appropriate)

## 2019-06-18 ENCOUNTER — APPOINTMENT (OUTPATIENT)
Dept: CT IMAGING | Facility: HOSPITAL | Age: 79
End: 2019-06-18

## 2019-06-18 PROCEDURE — 99024 POSTOP FOLLOW-UP VISIT: CPT | Performed by: NEUROLOGICAL SURGERY

## 2019-06-18 PROCEDURE — 94799 UNLISTED PULMONARY SVC/PX: CPT

## 2019-06-18 PROCEDURE — 97530 THERAPEUTIC ACTIVITIES: CPT

## 2019-06-18 PROCEDURE — 97166 OT EVAL MOD COMPLEX 45 MIN: CPT | Performed by: OCCUPATIONAL THERAPIST

## 2019-06-18 PROCEDURE — 70450 CT HEAD/BRAIN W/O DYE: CPT

## 2019-06-18 PROCEDURE — 97162 PT EVAL MOD COMPLEX 30 MIN: CPT

## 2019-06-18 PROCEDURE — 25010000002 MORPHINE PER 10 MG: Performed by: NEUROLOGICAL SURGERY

## 2019-06-18 RX ORDER — HYDROCODONE BITARTRATE AND ACETAMINOPHEN 5; 325 MG/1; MG/1
1 TABLET ORAL EVERY 6 HOURS PRN
Status: DISCONTINUED | OUTPATIENT
Start: 2019-06-18 | End: 2019-06-24 | Stop reason: HOSPADM

## 2019-06-18 RX ADMIN — HYDROCODONE BITARTRATE AND ACETAMINOPHEN 1 TABLET: 5; 325 TABLET ORAL at 15:20

## 2019-06-18 RX ADMIN — SODIUM CHLORIDE, PRESERVATIVE FREE 3 ML: 5 INJECTION INTRAVENOUS at 21:10

## 2019-06-18 RX ADMIN — MORPHINE SULFATE 2 MG: 2 INJECTION, SOLUTION INTRAMUSCULAR; INTRAVENOUS at 00:34

## 2019-06-18 RX ADMIN — SODIUM CHLORIDE, PRESERVATIVE FREE 3 ML: 5 INJECTION INTRAVENOUS at 15:23

## 2019-06-18 NOTE — PLAN OF CARE
Problem: Patient Care Overview  Goal: Plan of Care Review  Outcome: Ongoing (interventions implemented as appropriate)   06/18/19 6461   Coping/Psychosocial   Plan of Care Reviewed With daughter   OTHER   Outcome Summary Pt shows neuro deficit to place and time, but knows name, birthday, and what brought him to hospital. Pt was able to get to EOB with max assit x2 with SPT/OT. Pt sat EOB for several minutes showing weak core strength and minimal LE strength while sitting. Pt maintained contact with bed for support. Cognitve status is impaired post Sx. Pt will benefit from skilled therapy 2/day until d/c

## 2019-06-18 NOTE — PROGRESS NOTES
"Progress Note    Patient:  Madi Mancini  YOB: 1940  MRN: [id number]   Admit date: 6/15/2019   Admitting Physician: Galen Bhat MD  Primary Care Physician: Provider, No Known    Chief Complaint/Interval History: Subdural hematomas.  No events overnight.  Tolerating p.o.    Intake/Output Summary (Last 24 hours) at 6/18/2019 0726  Last data filed at 6/18/2019 0500  Gross per 24 hour   Intake 100 ml   Output 620 ml   Net -520 ml     Allergies:   Allergies   Allergen Reactions   • Penicillins Other (See Comments)     Causes mouth to be sore     Current Scheduled Medications:     LORazepam 0.5 mg Intravenous Once   sodium chloride 3 mL Intravenous Q12H     Current PRN Medications:  •  hydrALAZINE  •  HYDROcodone-acetaminophen  •  labetalol  •  Morphine  •  ondansetron  •  sodium chloride    Review of Systems   All other systems reviewed and are negative.      Vital Signs:  /91   Pulse 101   Temp 98.8 °F (37.1 °C) (Oral)   Resp 24   Ht 147.3 cm (58\")   Wt 106 kg (233 lb 12.8 oz)   SpO2 94%   BMI 48.86 kg/m²     Physical Exam   Constitutional: He is oriented to person, place, and time. He appears well-developed and well-nourished.   Cardiovascular: Normal rate and regular rhythm.   Pulmonary/Chest: Effort normal. No respiratory distress.   Abdominal: Soft. He exhibits no distension. There is no tenderness.   Neurological: He is oriented to person, place, and time. He has normal strength.   Psychiatric: His speech is normal.     Vital signs reviewed.  Line/IV site: No erythema, warmth, induration, or tenderness.    Objective:  Vital signs: (most recent): Blood pressure 139/91, pulse 101, temperature 98.8 °F (37.1 °C), temperature source Oral, resp. rate 24, height 147.3 cm (58\"), weight 106 kg (233 lb 12.8 oz), SpO2 94 %.    Lungs:  Normal effort.  He is not in respiratory distress.    Heart: Normal rate.  Regular rhythm.    Abdomen: Abdomen is soft and non-distended.        Neurologic Exam "     Mental Status   Oriented to person, place, and time.   Attention: decreased. Concentration: decreased.   Speech: speech is normal   Level of consciousness: alert  Awake but slow to respond.  Almost yeny phrenic bradykinetic presentation     Cranial Nerves   Cranial nerves II through XII intact.     Motor Exam   Muscle bulk: normal  Overall muscle tone: normal  Right arm pronator drift: absent  Left arm pronator drift: absent    Strength   Strength 5/5 throughout.     Sensory Exam   Light touch normal.   Pinprick normal.       Lab Results:  ABG:     CBC: Results from last 7 days   Lab Units 06/15/19  1349   WBC 10*3/mm3 9.42   HEMOGLOBIN g/dL 14.6   HEMATOCRIT % 44.2   PLATELETS 10*3/mm3 223     BMP:  Results from last 7 days   Lab Units 06/15/19  1349   SODIUM mmol/L 130*   POTASSIUM mmol/L 4.7   CHLORIDE mmol/L 94*   CO2 mmol/L 26.0   BUN mg/dL 18   CREATININE mg/dL 0.65   GLUCOSE mg/dL 120*   CALCIUM mg/dL 9.2   ALT (SGPT) U/L 23     Culture Results:        Bilateral subdural hematomas (CMS/HCC)      Radiology:   CT scan with bifrontal pneumocephalus.  Additional Studies Reviewed:     Impression/Recommendations:   CV: Heart rate and blood pressure stable  RESP: Oxygenating well  GI: Tolerating p.o.  : BUN/creatinine stable  NEURO: Neurologic exam essentially stable this morning.  Drains are no longer functional at this point.  Which is why there is likely some increase in pneumocephalus.  We will DC the drains today.  We will start 100% oxygen nonrebreather  ID:  Galen Bhat MD

## 2019-06-18 NOTE — PLAN OF CARE
Problem: Patient Care Overview  Goal: Plan of Care Review  Outcome: Ongoing (interventions implemented as appropriate)   06/18/19 0514   OTHER   Outcome Summary pt neuro deficit to time and place until 4am where he is now oriented x4, CLEMENT equally, CT done this am, Morphine x1 for HA, continue to monitor     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

## 2019-06-18 NOTE — THERAPY EVALUATION
Acute Care - Occupational Therapy Initial Evaluation  [facility]     Patient Name: Madi Mancini  : 1940  MRN: [id number]  Today's Date: 2019  Onset of Illness/Injury or Date of Surgery: 06/15/19  Date of Referral to OT: 19  Referring Physician: Dr. Bhat    Admit Date: 6/15/2019       ICD-10-CM ICD-9-CM   1. Bilateral subdural hematomas (CMS/HCC) S06.5X9A 432.1   2. Impaired physical mobility Z74.09 781.99   3. Decreased activities of daily living (ADL) R68.89 780.99     Patient Active Problem List   Diagnosis   • Bilateral subdural hematomas (CMS/HCC)     Past Medical History:   Diagnosis Date   • Elevated cholesterol    • Hypertension      Past Surgical History:   Procedure Laterality Date   • APPENDECTOMY     • CRANIOTOMY FOR SUBDURAL HEMATOMA Bilateral 2019    Procedure: CRANIOTOMY FOR SUBDURAL HEMATOMA;  Surgeon: Galen Bhat MD;  Location: [facility];  Service: Neurosurgery   • UMBILICAL HERNIA REPAIR            OT ASSESSMENT FLOWSHEET (last 12 hours)      Occupational Therapy Evaluation     Row Name 19 0819                   OT Evaluation Time/Intention    Subjective Information  complains of;fatigue;weakness;dizziness  -JJ        Document Type  evaluation  -JJ        Mode of Treatment  occupational therapy  -JJ        Patient Effort  adequate  -JJ        Symptoms Noted During/After Treatment  fatigue;shortness of breath  -JJ           General Information    Patient Profile Reviewed?  yes  -JJ        Onset of Illness/Injury or Date of Surgery  06/15/19  -JJ        Referring Physician  Dr. Bhat  -JJ        Patient Observations  alert;cooperative;agree to therapy  -JJ        Patient/Family Observations  daughter present in room   -JJ        General Observations of Patient  folwers in bed, very lethargic.   -JJ        Prior Level of Function  independent:;all household mobility;community mobility;transfer;bed mobility;ADL's per daughter report  -JJ        Equipment Currently  Used at Home  none  -JJ        Pertinent History of Current Functional Problem  Pt presented with c/o bifrontal headache, nausea and vomiting s/p fall approx 5 weeks ago. CT of brain from [facility] showed a large R subacute on chronic subdural hematoma with mass effect and midline shift and a L smaller subacute frontal subdural hematoma with some mass-effect. Pt s/p B frontal craniotomy. Dx: B sudural hematoma.   -JJ        Existing Precautions/Restrictions  fall  -JJ        Risks Reviewed  patient:;LOB;dizziness;nausea/vomiting;increased discomfort;change in vital signs;lines disloged;increased drainage  -JJ        Benefits Reviewed  patient:;improve function;increase independence;increase strength;decrease risk of DVT;decrease pain;increase balance;improve skin integrity;increase knowledge  -JJ        Barriers to Rehab  cognitive status  -JJ           Relationship/Environment    Primary Source of Support/Comfort  friend;child(malu)  -JJ        Lives With  alone  -JJ        Family Caregiver if Needed  child(malu), adult;friend(s)  -JJ           Resource/Environmental Concerns    Current Living Arrangements  home/apartment/condo  -JJ        Resource/Environmental Concerns  none  -JJ           Home Main Entrance    Number of Stairs, Main Entrance  three  -JJ        Stair Railings, Main Entrance  none  -JJ           Cognitive Assessment/Interventions    Additional Documentation  Cognitive Assessment/Intervention (Group)  -JJ           Cognitive Assessment/Intervention- PT/OT    Affect/Mental Status (Cognitive)  confused  -JJ        Behavioral Issues (Cognitive)  disinhibition  -JJ        Orientation Status (Cognition)  oriented to;person;situation  -JJ        Follows Commands (Cognition)  follows one step commands;0-24% accuracy  -JJ        Cognitive Function (Cognitive)  executive function deficit;attention deficit  -JJ        Attention Deficit (Cognitive)  concentration;arousal/alertness;focused/sustained attention  -JJ         Executive Function Deficit (Cognition)  insight/awareness of deficits;information processing  -        Safety Deficit (Cognitive)  ability to follow commands;awareness of need for assistance;insight into deficits/self awareness;problem solving;safety precautions awareness;safety precautions follow-through/compliance  -        Personal Safety Interventions  fall prevention program maintained;gait belt;muscle strengthening facilitated;nonskid shoes/slippers when out of bed;supervised activity  -           Safety Issues, Functional Mobility    Safety Issues Affecting Function (Mobility)  ability to follow commands;problem solving;insight into deficits/self awareness;awareness of need for assistance;safety precaution awareness  -        Impairments Affecting Function (Mobility)  balance;cognition;endurance/activity tolerance;pain;strength  -           Bed Mobility Assessment/Treatment    Bed Mobility Assessment/Treatment  supine-sit;sit-supine  -        Supine-Sit Orange (Bed Mobility)  maximum assist (25% patient effort);2 person assist  -        Sit-Supine [place] (Bed Mobility)  maximum assist (25% patient effort);2 person assist  -        Bed Mobility, Safety Issues  decreased use of arms for pushing/pulling;impaired trunk control for bed mobility;decreased use of legs for bridging/pushing  -        Assistive Device (Bed Mobility)  bed rails;draw sheet;head of bed elevated  -           Functional Mobility    Functional Mobility- Comment  Unable to perfrom at this time d/t cognitive status and ability to follow commands.   -           Transfer Assessment/Treatment    Transfer Assessment/Treatment  --  -        Comment (Transfers)  Unable to perform at this time d/t cognitive status and ability to follow commands.   -           ADL Assessment/Intervention    BADL Assessment/Intervention  lower body dressing  -           Lower Body Dressing Assessment/Training    Lower Body  Dressing [place] Level  don;socks;maximum assist (25% patient effort)  -JJ        Lower Body Dressing Position  edge of bed sitting  -JJ           BADL Safety/Performance    Impairments, BADL Safety/Performance  balance;cognition;endurance/activity tolerance;pain;strength  -JJ        Cognitive Impairments, BADL Safety/Performance  awareness, need for assistance;insight into deficits/self awareness;judgment;problem solving/reasoning;safety precaution awareness;safety precaution follow-through  -JJ        Skilled BADL Treatment/Intervention  BADL process/adaptation training  -JJ           General ROM    GENERAL ROM COMMENTS  Unable to formally assess d/t decreased command following and cognitive status, pt used B UEs to help push off side of bed. PROM WFL.   -JJ           MMT (Manual Muscle Testing)    General MMT Comments  BUE grossly 3-/5  -JJ           Motor Assessment/Interventions    Additional Documentation  Balance (Group)  -JJ           Balance    Balance  static sitting balance  -JJ           Static Sitting Balance    Level of [place] (Unsupported Sitting, Static Balance)  dependent, less than 25% patient effort  -JJ        Sitting Position (Unsupported Sitting, Static Balance)  sitting on edge of bed  -JJ        Time Able to Maintain Position (Unsupported Sitting, Static Balance)  30 to 45 seconds  -JJ        Level of [place] (Supported Sitting, Static Balance)  maximal assist, 25 to 49% patient effort  -JJ        Sitting Position (Supported Sitting, Static Balance)  sitting on edge of bed  -JJ        Time Able to Maintain Position (Supported Sitting, Static Balance)  30 to 45 seconds  -JJ           Sensory Assessment/Intervention    Sensory General Assessment  no sensation deficits identified in BUEs   -JJ           Positioning and Restraints    Pre-Treatment Position  in bed  -JJ        Post Treatment Position  bed  -JJ        In Bed  fowlers;call light within reach;encouraged to call for  assist;notified nsg;with family/caregiver;patient within staff view;side rails up x3;SCD pump applied  -JJ           Pain Assessment    Additional Documentation  Pain Scale: FACES Pre/Post-Treatment (Group)  -           Pain Scale: Numbers Pre/Post-Treatment    Pain Scale: Numbers, Pretreatment  0/10 - no pain  -JJ        Pain Scale: Numbers, Post-Treatment  0/10 - no pain  -JJ           Wound 06/16/19 0957 Other (See comments) head incision    Wound - Properties Group Date first assessed: 06/16/19  -LC Time first assessed: 0957  -LC Side: Other (See comments)  -LC Location: head  -LC Type: incision  -LC       Coping    Verbalized Emotional State  acceptance  -           Plan of Care Review    Plan of Care Reviewed With  patient;daughter  -           Clinical Impression (OT)    Date of Referral to OT  06/17/19  -J        OT Diagnosis  decreased adls  -JJ        Prognosis (OT Eval)  good  -JJ        Patient/Family Goals Statement (OT Eval)  return home  -J        Criteria for Skilled Therapeutic Interventions Met (OT Eval)  yes;treatment indicated  -JJ        Rehab Potential (OT Eval)  good, to achieve stated therapy goals  -J        Therapy Frequency (OT Eval)  5 times/wk  -        Predicted Duration of Therapy Intervention (Therapy Eval)  until d/c from facility  -J        Care Plan Review (OT)  evaluation/treatment results reviewed;care plan/treatment goals reviewed;risks/benefits reviewed;current/potential barriers reviewed;patient/other agree to care plan  -JJ        Anticipated Discharge Disposition (OT)  skilled nursing facility  -JJ           Vital Signs    Pre Systolic BP Rehab  145  -JJ        Pre Treatment Diastolic BP  89  -JJ        Pretreatment Heart Rate (beats/min)  108  -JJ        Pretreatment Resp Rate (breaths/min)  27  -JJ        Pre SpO2 (%)  100  -JJ        O2 Delivery Pre Treatment  non-rebreather 13L  -JJ        Pre Patient Position  Supine  -JJ        Intra Patient Position   Sitting  -JJ        Post Patient Position  Supine  -JJ           Planned OT Interventions    Planned Therapy Interventions (OT Eval)  activity tolerance training;adaptive equipment training;BADL retraining;functional balance retraining;occupation/activity based interventions;patient/caregiver education/training;strengthening exercise;transfer/mobility retraining  -JJ           OT Goals    Bed Mobility Goal Selection (OT)  bed mobility, OT goal 1  -JJ        Transfer Goal Selection (OT)  transfer, OT goal 1  -JJ        Dressing Goal Selection (OT)  dressing, OT goal 1  -JJ        Toileting Goal Selection (OT)  toileting, OT goal 1  -JJ           Bed Mobility Goal 1 (OT)    Activity/Assistive Device (Bed Mobility Goal 1, OT)  bed mobility activities, all  -JJ        [place] Level/Cues Needed (Bed Mobility Goal 1, OT)  minimum assist (75% or more patient effort);2 person assist  -JJ        Time Frame (Bed Mobility Goal 1, OT)  long term goal (LTG);by discharge  -JJ        Progress/Outcomes (Bed Mobility Goal 1, OT)  goal ongoing  -JJ           Transfer Goal 1 (OT)    Activity/Assistive Device (Transfer Goal 1, OT)  transfers, all  -JJ        [place] Level/Cues Needed (Transfer Goal 1, OT)  minimum assist (75% or more patient effort);2 person assist  -JJ        Time Frame (Transfer Goal 1, OT)  long term goal (LTG);by discharge  -JJ        Progress/Outcome (Transfer Goal 1, OT)  goal ongoing  -JJ           Dressing Goal 1 (OT)    Activity/Assistive Device (Dressing Goal 1, OT)  dressing skills, all  -JJ        [place]/Cues Needed (Dressing Goal 1, OT)  minimum assist (75% or more patient effort)  -JJ        Time Frame (Dressing Goal 1, OT)  long term goal (LTG);by discharge  -JJ        Progress/Outcome (Dressing Goal 1, OT)  goal ongoing  -JJ           Toileting Goal 1 (OT)    Activity/Device (Toileting Goal 1, OT)  toileting skills, all;commode  -JJ        [place] Level/Cues Needed (Toileting Goal  1, OT)  minimum assist (75% or more patient effort)  -        Time Frame (Toileting Goal 1, OT)  long term goal (LTG);by discharge  -        Progress/Outcome (Toileting Goal 1, OT)  goal ongoing  -           Living Environment    Home Accessibility  stairs to enter home  -          User Key  (r) = Recorded By, (t) = Taken By, (c) = Cosigned By    Initials Name Effective Dates    Keyla Hopkins RN 08/02/16 -      Ellen Beltran OTR/L 10/12/18 -          Occupational Therapy Education     Title: PT OT SLP Therapies (In Progress)     Topic: Occupational Therapy (In Progress)     Point: ADL training (Done)     Description: Instruct learner(s) on proper safety adaptation and remediation techniques during self care or transfers.   Instruct in proper use of assistive devices.    Learning Progress Summary           Patient Acceptance, E, VU by  at 6/18/2019 11:40 AM    Comment:  OT POC, adls, bed mobility, activity tolerance.   Family Acceptance, E, VU by  at 6/18/2019 11:40 AM    Comment:  OT POC, adls, bed mobility, activity tolerance.                               User Key     Initials Effective Dates Name Provider Type Discipline     10/12/18 -  Ellen Beltran OTR/L Occupational Therapist OT                  OT Recommendation and Plan  Outcome Summary/Treatment Plan (OT)  Anticipated Discharge Disposition (OT): skilled nursing facility  Planned Therapy Interventions (OT Eval): activity tolerance training, adaptive equipment training, BADL retraining, functional balance retraining, occupation/activity based interventions, patient/caregiver education/training, strengthening exercise, transfer/mobility retraining  Therapy Frequency (OT Eval): 5 times/wk  Plan of Care Review  Plan of Care Reviewed With: patient  Plan of Care Reviewed With: patient  Outcome Summary: OT eval completed. Pt is oriented to self only. Follows one-step commands approx 0-24% of the time, required increased processing  time, repetition of directions, verbal and tactile cues. Pt required Max Ax2 for supine <> sit at EOB. Pt sat EOB, with Max Ax2 for approx  3-4 minutes. Pt demo's decreased balance, strength, cognition, activity tolerance, and ROM. Pt would benefit from skilled OT services to address these deficits. Recommend d/c to SNF, pending pt progress.     Outcome Measures     Row Name 06/18/19 1100 06/18/19 0815          How much help from another person do you currently need...    Turning from your back to your side while in flat bed without using bedrails?  --  2  -MARYCARMEN (r) SC (t) MARYCARMEN (c)     Moving from lying on back to sitting on the side of a flat bed without bedrails?  --  2  -MARYCARMEN (r) SC (t) MARYCARMEN (c)     Moving to and from a bed to a chair (including a wheelchair)?  --  1  -MARYCARMEN (r) SC (t) MARYCARMEN (c)     Standing up from a chair using your arms (e.g., wheelchair, bedside chair)?  --  1  -MARYCARMEN (r) SC (t) MARYCARMEN (c)     Climbing 3-5 steps with a railing?  --  1  -MARYCARMEN (r) SC (t) MARYCARMEN (c)     To walk in hospital room?  --  1  -MARYCARMEN (r) SC (t) MARYCARMEN (c)     AM-PAC 6 Clicks Score  --  8  -MARYCARMEN (r) SC (t)        How much help from another is currently needed...    Putting on and taking off regular lower body clothing?  1  -JJ  --     Bathing (including washing, rinsing, and drying)  2  -JJ  --     Toileting (which includes using toilet bed pan or urinal)  1  -JJ  --     Putting on and taking off regular upper body clothing  2  -JJ  --     Taking care of personal grooming (such as brushing teeth)  2  -JJ  --     Eating meals  2  -JJ  --     Score  10  -JJ  --        Functional Assessment    Outcome Measure Options  AM-PAC 6 Clicks Daily Activity (OT)  -JJ  AM-PAC 6 Clicks Basic Mobility (PT)  -MARYCARMEN (r) SC (t) MARYCARMEN (c)       User Key  (r) = Recorded By, (t) = Taken By, (c) = Cosigned By    Initials Name Provider Type    Davy Lozoya, PT DPT Physical Therapist    Ellen Bunch, OTR/L Occupational Therapist    [place], John, PAULA Student  PT Student          Time Calculation:   Time Calculation- OT     Row Name 06/18/19 1139             Time Calculation- OT    OT Start Time  0837 add 18 mins for chart review   -      OT Stop Time  0913  -      OT Time Calculation (min)  36 min  -      OT Received On  06/18/19  -PABLO      OT Goal Re-Cert Due Date  06/28/19  -        User Key  (r) = Recorded By, (t) = Taken By, (c) = Cosigned By    Initials Name Provider Type    Ellen Bunch OTR/L Occupational Therapist        Therapy Charges for Today     Code Description Service Date Service Provider Modifiers Qty    [id number] HC OT EVAL MOD COMPLEXITY 4 6/18/2019 Ellen Beltran OTR/L GO 1               NELLIE Ramirez/MARBELLA  6/18/2019

## 2019-06-18 NOTE — PLAN OF CARE
Problem: Patient Care Overview  Goal: Plan of Care Review  Outcome: Ongoing (interventions implemented as appropriate)   06/18/19 1141   Coping/Psychosocial   Plan of Care Reviewed With patient   Plan of Care Review   Progress improving   OTHER   Outcome Summary OT eval completed. Pt is oriented to self only. Follows one-step commands approx 0-24% of the time, required increased processing time, repetition of directions, verbal and tactile cues. Pt required Max Ax2 for supine <> sit at EOB. Pt sat EOB, with Max Ax2 for approx 3-4 minutes. Pt demo's decreased balance, strength, cognition, activity tolerance, and ROM. Pt would benefit from skilled OT services to address these deficits. Recommend d/c to SNF, pending pt progress.
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
## 2019-06-18 NOTE — PLAN OF CARE
Problem: Patient Care Overview  Goal: Plan of Care Review  Outcome: Outcome(s) achieved Date Met: 06/18/19 06/18/19 1139   Coping/Psychosocial   Plan of Care Reviewed With patient;daughter   Plan of Care Review   Progress improving   OTHER   Outcome Summary Regular diet, oral intake avg 25% of past 5 meals. Pt's daughter feeding pt dinner at time of RDN visit. No difficulty chewing or swallowing at this time. No history of wt loss per family. Pt usuallly a good eater prior to admission. Adding Boost Plus with breakfast and dinner. Con to follow.       Problem: Nutrition, Imbalanced: Inadequate Oral Intake (Adult)  Goal: Identify Related Risk Factors and Signs and Symptoms  Outcome: Ongoing (interventions implemented as appropriate)    Goal: Improved Oral Intake  Outcome: Ongoing (interventions implemented as appropriate)    Goal: Prevent Further Weight Loss  Outcome: Ongoing (interventions implemented as appropriate)

## 2019-06-18 NOTE — PLAN OF CARE
Problem: Patient Care Overview  Goal: Plan of Care Review  Outcome: Ongoing (interventions implemented as appropriate)   06/18/19 1818   Coping/Psychosocial   Plan of Care Reviewed With patient;daughter   Plan of Care Review   Progress improving   OTHER   Outcome Summary Patient is doing well this evening. He has been internittently confused and lethargic. He will at times be better than others with alertness and orientation. He will consistantly follow commands by gripping; however, will not wiggle toes consistently to command. He is able to move legs and will spontaneously and to painful stimuli. This is all noted to Dr. Bhat and ALONDRA Strickland. He is eating better today but much improvement can be made. SBP has remained stable and HR remains  sinus tach. The patient is voiding well but the patient is incontinant and frequent changes are needed to keep the patient clean. Non-rebreather mask has been on and off every 2 hours as noted per Dr. Bhat's order for pneumocephalis treatment. Overall, the patient is doing fairly better and is recovering.       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  This evening the patient is remaining alert and disoriented to situation.     Problem: Fall Risk (Adult)  Goal: Absence of Fall  Outcome: Ongoing (interventions implemented as appropriate)   06/18/19 1818   Fall Risk (Adult)   Absence of Fall achieves outcome       Problem: Skin Injury Risk (Adult)  Goal: Skin Health and Integrity  Outcome: Ongoing  (interventions implemented as appropriate)   06/18/19 1818   Skin Injury Risk (Adult)   Skin Health and Integrity achieves outcome     No skin issues noted at this time.  Incontinence is being monitored.  The patient is unable to wear condom cath.      Problem: Nutrition, Imbalanced: Inadequate Oral Intake (Adult)  Goal: Prevent Further Weight Loss  Outcome: Ongoing (interventions implemented as appropriate)

## 2019-06-19 ENCOUNTER — APPOINTMENT (OUTPATIENT)
Dept: CT IMAGING | Facility: HOSPITAL | Age: 79
End: 2019-06-19

## 2019-06-19 PROCEDURE — 97535 SELF CARE MNGMENT TRAINING: CPT | Performed by: OCCUPATIONAL THERAPIST

## 2019-06-19 PROCEDURE — 94799 UNLISTED PULMONARY SVC/PX: CPT

## 2019-06-19 PROCEDURE — 99024 POSTOP FOLLOW-UP VISIT: CPT | Performed by: NEUROLOGICAL SURGERY

## 2019-06-19 PROCEDURE — 97530 THERAPEUTIC ACTIVITIES: CPT

## 2019-06-19 PROCEDURE — 97110 THERAPEUTIC EXERCISES: CPT

## 2019-06-19 PROCEDURE — 70450 CT HEAD/BRAIN W/O DYE: CPT

## 2019-06-19 PROCEDURE — 25010000002 MORPHINE PER 10 MG: Performed by: NEUROLOGICAL SURGERY

## 2019-06-19 RX ADMIN — SODIUM CHLORIDE, PRESERVATIVE FREE 3 ML: 5 INJECTION INTRAVENOUS at 09:17

## 2019-06-19 RX ADMIN — MORPHINE SULFATE 2 MG: 2 INJECTION, SOLUTION INTRAMUSCULAR; INTRAVENOUS at 20:42

## 2019-06-19 RX ADMIN — HYDROCODONE BITARTRATE AND ACETAMINOPHEN 1 TABLET: 5; 325 TABLET ORAL at 15:27

## 2019-06-19 RX ADMIN — SODIUM CHLORIDE, PRESERVATIVE FREE 3 ML: 5 INJECTION INTRAVENOUS at 20:05

## 2019-06-19 NOTE — PLAN OF CARE
Problem: Patient Care Overview  Goal: Plan of Care Review  Outcome: Ongoing (interventions implemented as appropriate)   06/19/19 1716   Coping/Psychosocial   Plan of Care Reviewed With patient;daughter   Plan of Care Review   Progress improving   OTHER   Outcome Summary pt disoriented to situation and place, follows commands, NRB every 2 hours, room air the rest of the time, VSS, see nursing note about skin, placed external cathter, turned every 2 hours, worked with PT and OT, pain meds x1 for right shoulder pain. 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      Problem: Nutrition, Imbalanced: Inadequate Oral Intake (Adult)  Goal: Identify Related Risk Factors and Signs and Symptoms  Outcome: Ongoing (interventions implemented as appropriate)    Goal: Improved Oral Intake  Outcome: Ongoing (interventions implemented as appropriate)    Goal: Prevent Further Weight Loss  Outcome: Ongoing (interventions implemented as appropriate)   06/19/19 1716   Nutrition, Imbalanced: Inadequate Oral Intake (Adult)   Prevent Further Weight Loss making progress toward outcome

## 2019-06-19 NOTE — THERAPY TREATMENT NOTE
Acute Care - Occupational Therapy Treatment Note  [facility]     Patient Name: Madi Mancini  : 1940  MRN: [id number]  Today's Date: 2019  Onset of Illness/Injury or Date of Surgery: 06/15/19  Date of Referral to OT: 19  Referring Physician: Dr. Bhat    Admit Date: 6/15/2019       ICD-10-CM ICD-9-CM   1. Bilateral subdural hematomas (CMS/HCC) S06.5X9A 432.1   2. Impaired physical mobility Z74.09 781.99   3. Decreased activities of daily living (ADL) R68.89 780.99     Patient Active Problem List   Diagnosis   • Bilateral subdural hematomas (CMS/HCC)     Past Medical History:   Diagnosis Date   • Elevated cholesterol    • Hypertension      Past Surgical History:   Procedure Laterality Date   • APPENDECTOMY     • CRANIOTOMY FOR SUBDURAL HEMATOMA Bilateral 2019    Procedure: CRANIOTOMY FOR SUBDURAL HEMATOMA;  Surgeon: Galen Bhat MD;  Location: [facility];  Service: Neurosurgery   • UMBILICAL HERNIA REPAIR         Therapy Treatment    Rehabilitation Treatment Summary     Row Name 19 1445 19 1415 19 0835       Treatment Time/Intention    Discipline  physical therapy assistant  -LG  occupational therapist  -JJ  physical therapy assistant  -    Document Type  --  therapy note (daily note)  -  therapy note (daily note)  -    Subjective Information  --  complains of  -  no complaints  -LG2    Mode of Treatment  --  individual therapy;occupational therapy  -JJ  individual therapy;physical therapy  -LG2    Patient/Family Observations  --  --  daughter preesnt  -LG2    Therapy Frequency (OT Eval)  --  5 times/wk  -  --    Comment  Pt working with OT, just back to bed after up on side of bed tx. Spoke with Val MARTI, ok to let pt rest and check back tomorrow.   -LG  --  --    Reason Treatment Not Performed  unavailable for treatment  -LG  --  --    Existing Precautions/Restrictions  --  fall  -J  --    Recorded by [LG] Augustus Paulino, DEYANIRA 19 1446 [JJ] Ruby  Ellen OTR/L 06/19/19 1421 [LG] Augustus Paulino, PTA 06/19/19 0837  [LG2] Augustus Paulino, PTA 06/19/19 0912    Row Name 06/19/19 1415 06/19/19 0835          Vital Signs    Pre Systolic BP Rehab  125  -JJ  132  -LG     Pre Treatment Diastolic BP  81  -JJ  89  -LG     Pretreatment Heart Rate (beats/min)  100  -JJ  113  -LG     Pretreatment Resp Rate (breaths/min)  22  -JJ  --     Pre SpO2 (%)  100  -JJ  95  -LG     O2 Delivery Pre Treatment  room air  -JJ  room air  -LG     Pre Patient Position  Supine  -JJ  --     Intra Patient Position  Sitting  -JJ  --     Post Patient Position  Supine  -JJ  --     Recorded by [JJ] Ellen Beltran OTR/L 06/19/19 1457 [LG] Augustus Paulino, PTA 06/19/19 0837     Row Name 06/19/19 1415 06/19/19 0835          Cognitive Assessment/Intervention- PT/OT    Affect/Mental Status (Cognitive)  --  confused  -LG     Orientation Status (Cognition)  oriented to;person;situation  -JJ  oriented to;person;situation  -LG     Follows Commands (Cognition)  follows one step commands;0-24% accuracy;increased processing time needed;repetition of directions required;verbal cues/prompting required  -JJ  follows one step commands  -LG     Cognitive Function (Cognitive)  executive function deficit;attention deficit  -JJ  --     Attention Deficit (Cognitive)  concentration;arousal/alertness;focused/sustained attention  -JJ  --     Executive Function Deficit (Cognition)  insight/awareness of deficits;information processing  -JJ  --     Safety Deficit (Cognitive)  awareness of need for assistance;ability to follow commands;insight into deficits/self awareness;safety precautions awareness;safety precautions follow-through/compliance;moderate deficit  -JJ  awareness of need for assistance;judgment;safety precautions awareness  -LG     Personal Safety Interventions  elopement precautions initiated;fall prevention program maintained;gait belt;muscle strengthening facilitated;nonskid shoes/slippers when out of  bed;supervised activity  -JJ  --     Recorded by [JJ] Ellen Beltran, ARISTEOR/MARBELLA 06/19/19 1457 [LG] Augustus Paulino, PTA 06/19/19 0912     Row Name 06/19/19 0835             Safety Issues, Functional Mobility    Safety Issues Affecting Function (Mobility)  ability to follow commands;awareness of need for assistance  -LG      Impairments Affecting Function (Mobility)  balance;cognition;postural/trunk control  -LG      Recorded by [LG] Augustus Paulino, PTA 06/19/19 0912      Row Name 06/19/19 1415 06/19/19 0835          Bed Mobility Assessment/Treatment    Bed Mobility Assessment/Treatment  supine-sit;sit-supine;rolling right;rolling left;scooting/bridging  -JJ  --     Rolling Left [place] (Bed Mobility)  maximum assist (25% patient effort);dependent (less than 25% patient effort);2 person assist;verbal cues  -JJ  verbal cues;maximum assist (25% patient effort);dependent (less than 25% patient effort);2 person assist  -LG     Rolling Right [place] (Bed Mobility)  maximum assist (25% patient effort);dependent (less than 25% patient effort);2 person assist;verbal cues  -JJ  verbal cues;moderate assist (50% patient effort);maximum assist (25% patient effort);2 person assist  -LG     Scooting/Bridging [place] (Bed Mobility)  dependent (less than 25% patient effort);2 person assist;verbal cues  -JJ  dependent (less than 25% patient effort);2 person assist  -LG     Supine-Sit [place] (Bed Mobility)  maximum assist (25% patient effort);dependent (less than 25% patient effort);2 person assist;verbal cues  -JJ  --     Sit-Supine [place] (Bed Mobility)  maximum assist (25% patient effort);dependent (less than 25% patient effort);2 person assist;verbal cues  -JJ  --     Bed Mobility, Safety Issues  cognitive deficits limit understanding;decreased use of arms for pushing/pulling;decreased use of legs for bridging/pushing;impaired trunk control for bed mobility  -JJ  cognitive deficits limit  understanding;decreased use of arms for pushing/pulling;decreased use of legs for bridging/pushing;impaired trunk control for bed mobility  -LG     Assistive Device (Bed Mobility)  bed rails;draw sheet  -JJ  bed rails;draw sheet  -LG     Comment (Bed Mobility)  --  Sat EOB to work on balance and ther ex  -LG     Recorded by [JJ] Ellen Beltran OTR/L 06/19/19 1457 [LG] Augustus Paulino, PTA 06/19/19 0912     Row Name 06/19/19 1415             ADL Assessment/Intervention    21693 - OT Self Care/Mgmt Minutes  31  -JJ      Recorded by [JJ] Ellen Beltran OTR/L 06/19/19 1457      Row Name 06/19/19 1415             BADL Safety/Performance    Cognitive Impairments, BADL Safety/Performance  awareness, need for assistance;insight into deficits/self awareness;safety precaution awareness;safety precaution follow-through  -JJ      Skilled BADL Treatment/Intervention  BADL process/adaptation training  -JJ      Recorded by [JJ] Ellen Beltran OTR/L 06/19/19 1457      Row Name 06/19/19 0835             Therapeutic Exercise    Comment (Therapeutic Exercise)  R LE  A-AAROM x 10 reps, L LE AROM x 1 LAQ, AAROM x 10.   -LG      Recorded by [LG] Augustus Paulino, PTA 06/19/19 0912      Row Name 06/19/19 1415             Balance    Balance  static sitting balance  -JJ      Recorded by [JJ] Ellen Beltran OTR/L 06/19/19 1457      Row Name 06/19/19 1415 06/19/19 0835          Static Sitting Balance    Level of [place] (Unsupported Sitting, Static Balance)  maximal assist, 25 to 49% patient effort;2 person assist  -JJ  contact guard assist;maximal assist, 25 to 49% patient effort;2 person assist  -LG     Sitting Position (Unsupported Sitting, Static Balance)  sitting on edge of bed  -JJ  long sitting on bed  -LG     Time Able to Maintain Position (Unsupported Sitting, Static Balance)  --  1 to 2 minutes  -LG     Level of [place] (Supported Sitting, Static Balance)  moderate assist, 50 to 74% patient effort;maximal  assist, 25 to 49% patient effort;2 person assist  -JJ  --     Sitting Position (Supported Sitting, Static Balance)  sitting on edge of bed  -JJ  --     Time Able to Maintain Position (Supported Sitting, Static Balance)  more than 5 minutes  -JJ  --     Recorded by [JJ] Ellen Beltran OTR/L 06/19/19 1457 [LG] Augustus Paulino, PTA 06/19/19 0912     Row Name 06/19/19 1415 06/19/19 0835          Positioning and Restraints    Pre-Treatment Position  in bed  -JJ  in bed  -LG     Post Treatment Position  bed  -JJ  bed  -LG     In Bed  fowlers;call light within reach;encouraged to call for assist;notified nsg;patient within staff view;with family/caregiver;side rails up x3;SCD pump applied;side lying right  -JJ  side lying left;call light within reach;encouraged to call for assist;exit alarm on;patient within staff view;with family/caregiver;side rails up x2;SCD pump applied;pillow between legs  -LG     Recorded by [CRISTIAN] Ellen Beltran OTR/L 06/19/19 1457 [LG] Augustus Paulino, PTA 06/19/19 0912     Row Name 06/19/19 1415             Pain Assessment    Additional Documentation  Pain Scale: Numbers Pre/Post-Treatment (Group)  -JJ      Recorded by [CRISTIAN] Ellen Beltran OTR/L 06/19/19 1457      Row Name 06/19/19 1415 06/19/19 0835          Pain Scale: Numbers Pre/Post-Treatment    Pain Scale: Numbers, Pretreatment  0/10 - no pain  -JJ  0/10 - no pain  -LG     Pain Scale: Numbers, Post-Treatment  0/10 - no pain  -JJ  0/10 - no pain  -LG     Recorded by [CRISTIAN] Ellen Beltran OTR/L 06/19/19 1457 [LG] Augustus Paulino, PTA 06/19/19 0912     Row Name                Wound 06/16/19 0957 Other (See comments) head incision    Wound - Properties Group Date first assessed: 06/16/19 [LC] Time first assessed: 0957 [LC] Side: Other (See comments) [LC] Location: head [LC] Type: incision [LC] Recorded by:  [LEX] Keyla Fletcher RN 06/16/19 0957    Row Name 06/19/19 1415             Coping    Verbalized Emotional State  acceptance  -JJ       Recorded by [CRISTIAN] Ellen Beltran OTR/L 06/19/19 1457      Row Name 06/19/19 1415             Plan of Care Review    Plan of Care Reviewed With  patient;daughter  -CRISTIAN      Recorded by [CRISTIAN] Ellen Beltran OTR/L 06/19/19 1457      Row Name 06/19/19 1415             Outcome Summary/Treatment Plan (OT)    Daily Summary of Progress (OT)  progress toward functional goals as expected  -CRISTIAN      Anticipated Discharge Disposition (OT)  skilled nursing facility  -CRISTIAN      Recorded by [CRISTIAN] Ellen Beltran OTR/L 06/19/19 1457        User Key  (r) = Recorded By, (t) = Taken By, (c) = Cosigned By    Initials Name Effective Dates Discipline    LG Augustus Paulino, DEYANIRA 08/02/16 -  PT    Keyla Hopkins RN 08/02/16 -  Nurse    Ellen Bunch OTR/L 10/12/18 -  OT        Wound 06/16/19 0957 Other (See comments) head incision (Active)   Dressing Appearance dry;intact 6/19/2019 12:03 PM   Closure Sutures 6/19/2019  7:40 AM   Base dry;scab 6/19/2019  7:40 AM   Periwound intact;dry 6/19/2019  7:40 AM   Drainage Characteristics/Odor serosanguineous 6/19/2019  4:00 AM   Drainage Amount none 6/19/2019 12:03 PM   Care, Wound cleansed with;antimicrobial agent applied 6/19/2019  7:40 AM   Dressing Care, Wound gauze;dressing changed 6/19/2019  7:40 AM       Occupational Therapy Education     Title: PT OT SLP Therapies (In Progress)     Topic: Occupational Therapy (In Progress)     Point: ADL training (Done)     Description: Instruct learner(s) on proper safety adaptation and remediation techniques during self care or transfers.   Instruct in proper use of assistive devices.    Learning Progress Summary           Patient Acceptance, E, VU by CRISTIAN at 6/18/2019 11:40 AM    Comment:  OT POC, adls, bed mobility, activity tolerance.   Family Acceptance, E, VU by CRISTIAN at 6/18/2019 11:40 AM    Comment:  OT POC, adls, bed mobility, activity tolerance.                               User Key     Initials Effective Dates Name Provider Type  Discipline     10/12/18 -  Ellen Beltran OTR/L Occupational Therapist OT                OT Recommendation and Plan  Outcome Summary/Treatment Plan (OT)  Daily Summary of Progress (OT): progress toward functional goals as expected  Anticipated Discharge Disposition (OT): skilled nursing facility  Planned Therapy Interventions (OT Eval): activity tolerance training, adaptive equipment training, BADL retraining, functional balance retraining, occupation/activity based interventions, patient/caregiver education/training, strengthening exercise, transfer/mobility retraining  Therapy Frequency (OT Eval): 5 times/wk  Daily Summary of Progress (OT): progress toward functional goals as expected  Plan of Care Review  Plan of Care Reviewed With: patient  Plan of Care Reviewed With: patient  Outcome Summary: OT tx completed. Pt more alert this tx, followed one-step commands approx 25-49% with increased processing time required. Oriented to self and situation. Pt was Max/Dependent x2 for all bed mobility. Pt able to sit at EOB for overe 5 minutes with Mod/Max Ax2 for attempted AROM of BUEs, pt able to complete 1 shoulder flexion of LUE. Continue OT POC.   Outcome Measures     Row Name 06/19/19 1400 06/18/19 1100 06/18/19 0815       How much help from another person do you currently need...    Turning from your back to your side while in flat bed without using bedrails?  --  --  2  -MARYCARMEN (r) SC (t) MARYCARMEN (c)    Moving from lying on back to sitting on the side of a flat bed without bedrails?  --  --  2  -MARYCARMEN (r) SC (t) MARYCARMEN (c)    Moving to and from a bed to a chair (including a wheelchair)?  --  --  1  -MARYCARMEN (r) SC (t) MARYCARMEN (c)    Standing up from a chair using your arms (e.g., wheelchair, bedside chair)?  --  --  1  -MARYCARMEN (r) SC (t) MARYCARMEN (c)    Climbing 3-5 steps with a railing?  --  --  1  -MARYCARMEN (r) SC (t) MARYCARMEN (c)    To walk in hospital room?  --  --  1  -MARYCARMEN (r) SC (t) MARYCARMEN (c)    AM-PAC 6 Clicks Score  --  --  8  -MARYCARMEN (r) SC (t)       How  much help from another is currently needed...    Putting on and taking off regular lower body clothing?  1  -JJ  1  -JJ  --    Bathing (including washing, rinsing, and drying)  2  -JJ  2  -JJ  --    Toileting (which includes using toilet bed pan or urinal)  1  -JJ  1  -JJ  --    Putting on and taking off regular upper body clothing  2  -JJ  2  -JJ  --    Taking care of personal grooming (such as brushing teeth)  2  -JJ  2  -JJ  --    Eating meals  3  -JJ  2  -JJ  --    Score  11  -JJ  10  -JJ  --       Functional Assessment    Outcome Measure Options  AM-PAC 6 Clicks Daily Activity (OT)  -JJ  AM-PAC 6 Clicks Daily Activity (OT)  -JJ  AM-PAC 6 Clicks Basic Mobility (PT)  -MARYCARMEN (r) SC (t) MARYCARMEN (c)      User Key  (r) = Recorded By, (t) = Taken By, (c) = Cosigned By    Initials Name Provider Type    Davy Lozoya, PT DPT Physical Therapist    Ellen Bunch OTR/L Occupational Therapist    SC John, PT Student PT Student           Time Calculation:   Time Calculation- OT     Row Name 06/19/19 1458 06/19/19 1415          Time Calculation- OT    OT Start Time  1415  -JJ  --     OT Stop Time  1446  -JJ  --     OT Time Calculation (min)  31 min  -JJ  --     Total Timed Code Minutes- OT  31 minute(s)  -JJ  --     OT Received On  06/19/19  -J  --        Timed Charges    00977 - OT Self Care/Mgmt Minutes  --  31  -JJ       User Key  (r) = Recorded By, (t) = Taken By, (c) = Cosigned By    Initials Name Provider Type    Ellen Bunch OTR/L Occupational Therapist        Therapy Charges for Today     Code Description Service Date Service Provider Modifiers Qty    [id number]  OT EVAL MOD COMPLEXITY 4 6/18/2019 Ellen Beltran OTR/L GO 1    [id number] HC OT SELF CARE/MGMT/TRAIN EA 15 MIN 6/19/2019 Ellen Beltran OTR/L GO 2               NELLIE Ramirez/MARBELLA  6/19/2019

## 2019-06-19 NOTE — THERAPY TREATMENT NOTE
Acute Care - Physical Therapy Treatment Note  [facility]     Patient Name: Madi Mancini  : 1940  MRN: [id number]  Today's Date: 2019  Onset of Illness/Injury or Date of Surgery: 06/15/19  Date of Referral to PT: 19  Referring Physician: Dr. Bhat    Admit Date: 6/15/2019    Visit Dx:    ICD-10-CM ICD-9-CM   1. Bilateral subdural hematomas (CMS/HCC) S06.5X9A 432.1   2. Impaired physical mobility Z74.09 781.99   3. Decreased activities of daily living (ADL) R68.89 780.99     Patient Active Problem List   Diagnosis   • Bilateral subdural hematomas (CMS/HCC)       Therapy Treatment    Rehabilitation Treatment Summary     Row Name 19 0835             Treatment Time/Intention    Discipline  physical therapy assistant  -LG      Document Type  therapy note (daily note)  -LG      Subjective Information  no complaints  -LG2      Mode of Treatment  individual therapy;physical therapy  -LG2      Patient/Family Observations  daughter preesnt  -LG2      Recorded by [LG] Augustus Paulino, PTA 19 0837  [LG2] Augustus Paulino, PTA 19 0912      Row Name 19 0835             Vital Signs    Pre Systolic BP Rehab  132  -LG      Pre Treatment Diastolic BP  89  -LG      Pretreatment Heart Rate (beats/min)  113  -LG      Pre SpO2 (%)  95  -LG      O2 Delivery Pre Treatment  room air  -LG      Recorded by [LG] Augustus Paulino, PTA 19 0837      Row Name 19 0835             Cognitive Assessment/Intervention- PT/OT    Affect/Mental Status (Cognitive)  confused  -LG      Orientation Status (Cognition)  oriented to;person;situation  -LG      Follows Commands (Cognition)  follows one step commands  -LG      Safety Deficit (Cognitive)  awareness of need for assistance;judgment;safety precautions awareness  -LG      Recorded by [LG] Augustus Paulino, PTA 19 09      Row Name 19 0835             Safety Issues, Functional Mobility    Safety Issues Affecting Function (Mobility)  ability to follow  commands;awareness of need for assistance  -LG      Impairments Affecting Function (Mobility)  balance;cognition;postural/trunk control  -LG      Recorded by [LG] Augustus Paulino, PTA 06/19/19 0912      Row Name 06/19/19 0835             Bed Mobility Assessment/Treatment    Rolling Left [place] (Bed Mobility)  verbal cues;maximum assist (25% patient effort);dependent (less than 25% patient effort);2 person assist  -LG      Rolling Right [place] (Bed Mobility)  verbal cues;moderate assist (50% patient effort);maximum assist (25% patient effort);2 person assist  -LG      Scooting/Bridging L[place] (Bed Mobility)  dependent (less than 25% patient effort);2 person assist  -LG      Bed Mobility, Safety Issues  cognitive deficits limit understanding;decreased use of arms for pushing/pulling;decreased use of legs for bridging/pushing;impaired trunk control for bed mobility  -LG      Assistive Device (Bed Mobility)  bed rails;draw sheet  -LG      Comment (Bed Mobility)  Sat EOB to work on balance and ther ex  -LG      Recorded by [LG] Augustus Paulino, PTA 06/19/19 0912      Row Name 06/19/19 0835             Therapeutic Exercise    Comment (Therapeutic Exercise)  R LE  A-AAROM x 10 reps, L LE AROM x 1 LAQ, AAROM x 10.   -LG      Recorded by [LG] Augustus Paulino, PTA 06/19/19 0912      Row Name 06/19/19 0835             Static Sitting Balance    Level of [place] (Unsupported Sitting, Static Balance)  contact guard assist;maximal assist, 25 to 49% patient effort;2 person assist  -LG      Sitting Position (Unsupported Sitting, Static Balance)  long sitting on bed  -LG      Time Able to Maintain Position (Unsupported Sitting, Static Balance)  1 to 2 minutes  -LG      Recorded by [LG] Augustus Paulino, PTA 06/19/19 0912      Row Name 06/19/19 0835             Positioning and Restraints    Pre-Treatment Position  in bed  -LG      Post Treatment Position  bed  -LG      In Bed  side lying left;call light within  reach;encouraged to call for assist;exit alarm on;patient within staff view;with family/caregiver;side rails up x2;SCD pump applied;pillow between legs  -LG      Recorded by [LG] Augustus Paulino, PTA 06/19/19 0912      Row Name 06/19/19 0835             Pain Scale: Numbers Pre/Post-Treatment    Pain Scale: Numbers, Pretreatment  0/10 - no pain  -LG      Pain Scale: Numbers, Post-Treatment  0/10 - no pain  -LG      Recorded by [LG] Augustus Paulino, PTA 06/19/19 0912      Row Name                Wound 06/16/19 0957 Other (See comments) head incision    Wound - Properties Group Date first assessed: 06/16/19 [LC] Time first assessed: 0957 [LC] Side: Other (See comments) [LC] Location: head [LC] Type: incision [LC] Recorded by:  [LC] Keyla Fletcher RN 06/16/19 0957      User Key  (r) = Recorded By, (t) = Taken By, (c) = Cosigned By    Initials Name Effective Dates Discipline    LG Augustus Paulino, PTA 08/02/16 -  PT    LC Keyla Fletcher RN 08/02/16 -  Nurse          Wound 06/16/19 0957 Other (See comments) head incision (Active)   Dressing Appearance dry;intact 6/19/2019  4:00 AM   Closure Sutures 6/19/2019  7:40 AM   Base dry;scab 6/19/2019  7:40 AM   Periwound intact;dry 6/19/2019  7:40 AM   Drainage Characteristics/Odor serosanguineous 6/19/2019  4:00 AM   Drainage Amount none 6/19/2019  7:40 AM   Care, Wound cleansed with;antimicrobial agent applied 6/19/2019  7:40 AM   Dressing Care, Wound gauze;dressing changed 6/19/2019  7:40 AM           Physical Therapy Education     Title: PT OT SLP Therapies (In Progress)     Topic: Physical Therapy (In Progress)     Point: Mobility training (Done)     Learning Progress Summary           Patient Acceptance, E, VU by SC at 6/18/2019  9:45 AM    Comment:  SPT/OT reviewed safety concerns and mobility deficits with pt and daughter while mobilizing pt in bed                   Point: Body mechanics (Done)     Learning Progress Summary           Patient Acceptance, E, VU by SC at  6/18/2019  9:45 AM    Comment:  SPT/OT reviewed safety concerns and mobility deficits with pt and daughter while mobilizing pt in bed                   Point: Precautions (Done)     Learning Progress Summary           Patient DOMINICK Tellez VU by SC at 6/18/2019  9:45 AM    Comment:  SPT/OT reviewed safety concerns and mobility deficits with pt and daughter while mobilizing pt in bed                               User Key     Initials Effective Dates Name Provider Type Discipline    SC 05/15/19 -  John Peace, PT Student PT Student PT                PT Recommendation and Plan     Plan of Care Reviewed With: patient, daughter  Progress: improving  Outcome Summary: Pt awake/alert with daughter bedside. Denies any pain, states he's at [place] then smiled. Pt requiring extra time for processing and does not follow all commands. Max-Dependent x 2 Supine to Sit. pt can move R LE a few inches towards EOB but only wiggle L LE toes when in supine. Sitting EOB he requires CGA-Mod A to maintain upright posture. He did completed 2 AROM LAQ's on R LE with commands then required constant vc's to get to 10.  He was able to complete 1 LAQ on L LE with guad muscle tapping. sat EOB approx 13 minutes.   Outcome Measures     Row Name 06/18/19 1100 06/18/19 0815          How much help from another person do you currently need...    Turning from your back to your side while in flat bed without using bedrails?  --  2  -MARYCARMEN (r) SC (t) MARYCARMEN (c)     Moving from lying on back to sitting on the side of a flat bed without bedrails?  --  2  -MARYCARMEN (r) SC (t) MARYCARMEN (c)     Moving to and from a bed to a chair (including a wheelchair)?  --  1  -MARYCARMEN (r) SC (t) MARYCARMEN (c)     Standing up from a chair using your arms (e.g., wheelchair, bedside chair)?  --  1  -MARYCARMEN (r) SC (t) MARYCARMEN (c)     Climbing 3-5 steps with a railing?  --  1  -MARYCARMEN (r) SC (t) MARYCARMEN (c)     To walk in hospital room?  --  1  -MARYCARMEN (r) SC (t) MARYCARMEN (c)     AM-PAC 6 Clicks Score  --  8  -MARYCARMEN (r) SC (t)         How much help from another is currently needed...    Putting on and taking off regular lower body clothing?  1  -JJ  --     Bathing (including washing, rinsing, and drying)  2  -JJ  --     Toileting (which includes using toilet bed pan or urinal)  1  -JJ  --     Putting on and taking off regular upper body clothing  2  -JJ  --     Taking care of personal grooming (such as brushing teeth)  2  -JJ  --     Eating meals  2  -JJ  --     Score  10  -JJ  --        Functional Assessment    Outcome Measure Options  AM-PAC 6 Clicks Daily Activity (OT)  -  AM-PAC 6 Clicks Basic Mobility (PT)  -MARYCARMEN (r) SC (t) MARYCARMEN (c)       User Key  (r) = Recorded By, (t) = Taken By, (c) = Cosigned By    Initials Name Provider Type    Davy Lozoya, PT DPT Physical Therapist    Ellen Bunch, OTR/L Occupational Therapist    John Bundy, PT Student PT Student         Time Calculation:   PT Charges     Row Name 06/19/19 0835             Time Calculation    Start Time  0835  -LG      Stop Time  0913  -      Time Calculation (min)  38 min  -LG      PT Received On  06/19/19  -      PT Goal Re-Cert Due Date  06/28/19  -         Time Calculation- PT    Total Timed Code Minutes- PT  38 minute(s)  -LG        User Key  (r) = Recorded By, (t) = Taken By, (c) = Cosigned By    Initials Name Provider Type     Augustus Paulino PTA Physical Therapy Assistant        Therapy Charges for Today     Code Description Service Date Service Provider Modifiers Qty    31594361949 HC PT THERAPEUTIC ACT EA 15 MIN 6/19/2019 Augustus aPulino PTA GP 2    84761961652 HC PT THER PROC EA 15 MIN 6/19/2019 Augustus Paulino PTA GP 1          PT G-Codes  Outcome Measure Options: AM-PAC 6 Clicks Daily Activity (OT)  AM-PAC 6 Clicks Score: 8  Score: 10    Augustus Paulino PTA  6/19/2019

## 2019-06-19 NOTE — PLAN OF CARE
Problem: Patient Care Overview  Goal: Plan of Care Review  Outcome: Ongoing (interventions implemented as appropriate)   06/19/19 1047   Coping/Psychosocial   Plan of Care Reviewed With patient;daughter   Plan of Care Review   Progress improving   OTHER   Outcome Summary Pt awake/alert with daughter bedside. Denies any pain, states he's at [place] then smiled. Pt requiring extra time for processing and does not follow all commands. Max-Dependent x 2 Supine to Sit. pt can move R LE a few inches towards EOB but only wiggle L LE toes when in supine. Sitting EOB he requires CGA-Mod A to maintain upright posture. He did completed 2 AROM LAQ's on R LE with commands then required constant vc's to get to 10. He was able to complete 1 LAQ on L LE with guad muscle tapping. sat EOB approx 13 minutes.

## 2019-06-19 NOTE — PLAN OF CARE
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)  Goal: Signs and Symptoms of Listed Potential Problems Will be Absent, Minimized or Managed (Brain Injury, Moderate Traumatic)  Outcome: Ongoing (interventions implemented as appropriate)      Problem: Fall Risk (Adult)  Goal: Absence of Fall  Outcome: Ongoing (interventions implemented as appropriate)      Problem: Nutrition, Imbalanced: Inadequate Oral Intake (Adult)  Goal: Identify Related Risk Factors and Signs and Symptoms  Outcome: Ongoing (interventions implemented as appropriate)    Goal: Improved Oral Intake  Outcome: Ongoing (interventions implemented as appropriate)    Goal: Prevent Further Weight Loss  Outcome: Ongoing (interventions implemented as appropriate)

## 2019-06-19 NOTE — PROGRESS NOTES
Continued Stay Note   [facility]     Patient Name: Madi Mancini  MRN: [id number]  Today's Date: 6/19/2019    Admit Date: 6/15/2019    Discharge Plan     Row Name 06/19/19 1134       Plan    Plan  SNF vs acute rehab    Patient/Family in Agreement with Plan  yes    Plan Comments  SW spoke with patient's daughter regarding discharge plan.  SW advised therapy had recommended placement.  Daughter states she resides in [place].  SW advised patient could do rehab/SNF closer to daughter's residence.  Daughter states she is willing to care for patient in her home if he will agree.  However, patient is currently confused and daughter is unable to have that conversation with him at this time.  Daughter is hoping patient's confusion will clear so she can discuss his preferences with him in regard to discharge plan.        Discharge Codes    No documentation.             BREANNA Beltran

## 2019-06-19 NOTE — NURSING NOTE
Spoke with Simona Barragan RN (wound care) regarding red blanchable spots on buttocks and coccyx. Simona recommended comfort glide sheet and wedges as well as hydrogaurd.     Implemented recommendations.

## 2019-06-19 NOTE — PROGRESS NOTES
"Progress Note    Patient:  Madi Mancini  YOB: 1940  MRN: [id number]   Admit date: 6/15/2019   Admitting Physician: Galen Bhat MD  Primary Care Physician: Provider, No Known    Chief Complaint/Interval History: subdural hematoma. No events overnight. Tolerating PO, working with PT    Intake/Output Summary (Last 24 hours) at 6/19/2019 0655  Last data filed at 6/18/2019 1629  Gross per 24 hour   Intake 690 ml   Output --   Net 690 ml     Allergies:   Allergies   Allergen Reactions   • Penicillins Other (See Comments)     Causes mouth to be sore     Current Scheduled Medications:     LORazepam 0.5 mg Intravenous Once   sodium chloride 3 mL Intravenous Q12H     Current PRN Medications:  •  hydrALAZINE  •  HYDROcodone-acetaminophen  •  labetalol  •  Morphine  •  ondansetron  •  sodium chloride    Review of Systems   All other systems reviewed and are negative.      Vital Signs:  /91 (BP Location: Right arm, Patient Position: Lying)   Pulse 99   Temp 98.5 °F (36.9 °C) (Oral)   Resp 20   Ht 167.6 cm (66\") Comment: per family  Wt 105 kg (231 lb 11.2 oz)   SpO2 96%   BMI 37.40 kg/m²     Physical Exam   Constitutional: He is oriented to person, place, and time. He appears well-developed and well-nourished.   Cardiovascular: Normal rate, regular rhythm and normal heart sounds.   Pulmonary/Chest: Effort normal. No respiratory distress.   Abdominal: Soft. He exhibits no distension. There is no tenderness.   Neurological: He is oriented to person, place, and time. He has normal strength.     Vital signs reviewed.  Line/IV site: No erythema, warmth, induration, or tenderness.    Objective:  Vital signs: (most recent): Blood pressure 133/91, pulse 99, temperature 98.5 °F (36.9 °C), temperature source Oral, resp. rate 20, height 167.6 cm (66\"), weight 105 kg (231 lb 11.2 oz), SpO2 96 %.    Lungs:  Normal effort.  He is not in respiratory distress.    Heart: Normal rate.  Regular rhythm.    Abdomen: " Abdomen is soft and non-distended.        Neurologic Exam     Mental Status   Oriented to person, place, and time.   Level of consciousness: alert  apprporiate but some yeny phernia and kojo kinesia     Cranial Nerves   Cranial nerves II through XII intact.     Motor Exam   Muscle bulk: normal  Overall muscle tone: normal    Strength   Strength 5/5 throughout. CLEMENT symmetrically, follows commands     Sensory Exam   Light touch normal.   Pinprick normal.     Gait, Coordination, and Reflexes     Tremor   Resting tremor: absent  Intention tremor: absent  Action tremor: absent      Lab Results:  ABG:     CBC: Results from last 7 days   Lab Units 06/15/19  1349   WBC 10*3/mm3 9.42   HEMOGLOBIN g/dL 14.6   HEMATOCRIT % 44.2   PLATELETS 10*3/mm3 223     BMP:  Results from last 7 days   Lab Units 06/15/19  1349   SODIUM mmol/L 130*   POTASSIUM mmol/L 4.7   CHLORIDE mmol/L 94*   CO2 mmol/L 26.0   BUN mg/dL 18   CREATININE mg/dL 0.65   GLUCOSE mg/dL 120*   CALCIUM mg/dL 9.2   ALT (SGPT) U/L 23     Culture Results:        Bilateral subdural hematomas (CMS/HCC)      Radiology:     Additional Studies Reviewed:     Impression/Recommendations:   CV:HR, BP stable  RESP:oxygenating well  GI:tolerating PO  :BUN, Cr stable  NEURO:brighter this am, CT slightly improved pneumocephalus  ID:  Galen Bhat MD

## 2019-06-19 NOTE — PLAN OF CARE
Problem: Patient Care Overview  Goal: Plan of Care Review  Outcome: Ongoing (interventions implemented as appropriate)   06/19/19 9100   Coping/Psychosocial   Plan of Care Reviewed With patient   Plan of Care Review   Progress improving   OTHER   Outcome Summary OT tx completed. Pt more alert this tx, followed one-step commands approx 25-49% with increased processing time required. Oriented to self and situation. Pt was Max/Dependent x2 for all bed mobility. Pt able to sit at EOB for overe 5 minutes with Mod/Max Ax2 for attempted AROM of BUEs, pt able to complete 1 shoulder flexion of LUE. Continue OT POC.

## 2019-06-20 ENCOUNTER — APPOINTMENT (OUTPATIENT)
Dept: CT IMAGING | Facility: HOSPITAL | Age: 79
End: 2019-06-20

## 2019-06-20 PROCEDURE — 97110 THERAPEUTIC EXERCISES: CPT

## 2019-06-20 PROCEDURE — 70450 CT HEAD/BRAIN W/O DYE: CPT

## 2019-06-20 PROCEDURE — 97535 SELF CARE MNGMENT TRAINING: CPT | Performed by: OCCUPATIONAL THERAPIST

## 2019-06-20 PROCEDURE — 97530 THERAPEUTIC ACTIVITIES: CPT

## 2019-06-20 PROCEDURE — 97110 THERAPEUTIC EXERCISES: CPT | Performed by: OCCUPATIONAL THERAPIST

## 2019-06-20 PROCEDURE — 99024 POSTOP FOLLOW-UP VISIT: CPT | Performed by: NURSE PRACTITIONER

## 2019-06-20 RX ADMIN — HYDROCODONE BITARTRATE AND ACETAMINOPHEN 1 TABLET: 5; 325 TABLET ORAL at 20:14

## 2019-06-20 RX ADMIN — SODIUM CHLORIDE, PRESERVATIVE FREE 3 ML: 5 INJECTION INTRAVENOUS at 20:03

## 2019-06-20 RX ADMIN — SODIUM CHLORIDE, PRESERVATIVE FREE 3 ML: 5 INJECTION INTRAVENOUS at 08:55

## 2019-06-20 NOTE — PROGRESS NOTES
"Progress Note    Patient:  Madi Mnacini  YOB: 1940  MRN: [id number]   Admit date: 6/15/2019   Admitting Physician: Galen Bhat MD  Primary Care Physician: Provider, No Known    Chief Complaint/Interval History: Subdural hematoma status post craniotomy.  Patient appears to be doing better today.  He is following commands and does appear to be more alert.  Off Cardene.  No issues overnight    Intake/Output Summary (Last 24 hours) at 6/20/2019 0708  Last data filed at 6/19/2019 1810  Gross per 24 hour   Intake --   Output 350 ml   Net -350 ml     Allergies:   Allergies   Allergen Reactions   • Penicillins Other (See Comments)     Causes mouth to be sore     Current Scheduled Medications:     LORazepam 0.5 mg Intravenous Once   sodium chloride 3 mL Intravenous Q12H     Current PRN Medications:  •  hydrALAZINE  •  HYDROcodone-acetaminophen  •  labetalol  •  Morphine  •  ondansetron  •  sodium chloride    Review of Systems    Vital Signs:  /80   Pulse 88   Temp 97.9 °F (36.6 °C) (Oral)   Resp 21   Ht 167.6 cm (66\") Comment: per family  Wt 103 kg (227 lb 4.7 oz)   SpO2 96%   BMI 36.69 kg/m²     Physical Exam   Constitutional: He is oriented to person, place, and time. He appears well-developed and well-nourished.   Cardiovascular: Normal rate, regular rhythm and normal heart sounds.   Pulmonary/Chest: Effort normal. No respiratory distress.   Abdominal: Soft. He exhibits no distension. There is no tenderness.   Neurological: He is oriented to person, place, and time. He has normal strength.     Vital signs reviewed.  Line/IV site: No erythema, warmth, induration, or tenderness.    Objective:  Vital signs: (most recent): Blood pressure 127/80, pulse 88, temperature 97.9 °F (36.6 °C), temperature source Oral, resp. rate 21, height 167.6 cm (66\"), weight 103 kg (227 lb 4.7 oz), SpO2 96 %.    Lungs:  Normal effort.  He is not in respiratory distress.    Heart: Normal rate.  Regular rhythm.  "   Abdomen: Abdomen is soft and non-distended.        Neurologic Exam     Mental Status   Oriented to person, place, and time.   Level of consciousness: alert  apprporiate but some yeny phernia and kojo kinesia     Cranial Nerves   Cranial nerves II through XII intact.     Motor Exam   Muscle bulk: normal  Overall muscle tone: normal    Strength   Strength 5/5 throughout. CLEMENT symmetrically, follows commands     Sensory Exam   Light touch normal.   Pinprick normal.     Gait, Coordination, and Reflexes     Tremor   Resting tremor: absent  Intention tremor: absent  Action tremor: absent      Lab Results:  ABG:     CBC: Results from last 7 days   Lab Units 06/15/19  1349   WBC 10*3/mm3 9.42   HEMOGLOBIN g/dL 14.6   HEMATOCRIT % 44.2   PLATELETS 10*3/mm3 223     BMP:  Results from last 7 days   Lab Units 06/15/19  1349   SODIUM mmol/L 130*   POTASSIUM mmol/L 4.7   CHLORIDE mmol/L 94*   CO2 mmol/L 26.0   BUN mg/dL 18   CREATININE mg/dL 0.65   GLUCOSE mg/dL 120*   CALCIUM mg/dL 9.2   ALT (SGPT) U/L 23     Culture Results:        Bilateral subdural hematomas (CMS/HCC)      Radiology: CT scan shows improvement of the pneumocephalus.    Additional Studies Reviewed:     Impression/Recommendations:   CV: Heart rate and blood pressure stable  RESP: Oxygen level stable  GI: Adequate oral intake  : Adequate urine output, BUN and creatinine stable  NEURO: Neuro exam stable and improving  ID: None  We will send the patient to neuro floor.  Continue with physical and occupational therapy.  Continue with nonrebreather 2 hours on 2 hours off  ALONDRA Richardson

## 2019-06-20 NOTE — THERAPY TREATMENT NOTE
Acute Care - Physical Therapy Treatment Note  [facility]     Patient Name: Madi Mancini  : 1940  MRN: [id number]  Today's Date: 2019  Onset of Illness/Injury or Date of Surgery: 06/15/19  Date of Referral to PT: 19  Referring Physician: Dr. Bhat    Admit Date: 6/15/2019    Visit Dx:    ICD-10-CM ICD-9-CM   1. Bilateral subdural hematomas (CMS/HCC) S06.5X9A 432.1   2. Impaired physical mobility Z74.09 781.99   3. Decreased activities of daily living (ADL) R68.89 780.99     Patient Active Problem List   Diagnosis   • Bilateral subdural hematomas (CMS/HCC)       Therapy Treatment    Rehabilitation Treatment Summary     Row Name 19 0827             Treatment Time/Intention    Discipline  physical therapy assistant  -LG      Document Type  therapy note (daily note)  -LG      Subjective Information  complains of;weakness;fatigue  -LG2      Mode of Treatment  individual therapy;physical therapy  -LG2      Patient/Family Observations  daughter bedside  -LG2      Existing Precautions/Restrictions  fall  -LG2      Recorded by [LG] Augustus Paulino, PTA 19 0828  [LG2] Augustus Paulino, PTA 19 1041      Row Name 19 0827             Vital Signs    Pre Systolic BP Rehab  139  -LG      Pre Treatment Diastolic BP  90  -LG      Pretreatment Heart Rate (beats/min)  105  -LG      Pre SpO2 (%)  95  -LG      Recorded by [LG] Augustus Paulino, PTA 19 0828      Row Name 19 0827             Bed Mobility Assessment/Treatment    Scooting/Bridging [place] (Bed Mobility)  dependent (less than 25% patient effort);2 person assist  -LG      Supine-Sit [place] (Bed Mobility)  verbal cues;moderate assist (50% patient effort);maximum assist (25% patient effort);2 person assist  -LG      Sit-Supine [place] (Bed Mobility)  moderate assist (50% patient effort);2 person assist  -LG      Bed Mobility, Safety Issues  cognitive deficits limit understanding;decreased use of arms for  pushing/pulling;decreased use of legs for bridging/pushing  -LG      Assistive Device (Bed Mobility)  draw sheet  -LG      Comment (Bed Mobility)  Sat EOB worked on sitting balance, reaching and LE Strengthening exercises  -LG      Recorded by [LG] Augustus Paulino, PTA 06/20/19 1041      Row Name 06/20/19 0827             Bed-Chair Transfer    Bed-Chair [place] (Transfers)  verbal cues;minimum assist (75% patient effort);2 person assist  -LG      Assistive Device (Bed-Chair Transfers)  -- HHA x 2  -LG      Recorded by [LG] Augustus Paulino, PTA 06/20/19 1044      Row Name 06/20/19 0827             Chair-Bed Transfer    Chair-Bed [place] (Transfers)  verbal cues;minimum assist (75% patient effort);stand by assist  -LG      Recorded by [LG] Augustus Paulino, PTA 06/20/19 1044      Row Name 06/20/19 0827             Sit-Stand Transfer    Sit-Stand [place] (Transfers)  verbal cues;minimum assist (75% patient effort);moderate assist (50% patient effort);2 person assist  -LG      Recorded by [LG] Augustus Paulino, PTA 06/20/19 1044      Row Name 06/20/19 0827             Stand-Sit Transfer    Stand-Sit [place] (Transfers)  verbal cues;minimum assist (75% patient effort);2 person assist  -LG      Recorded by [LG] Augustus Paulino, PTA 06/20/19 1044      Row Name 06/20/19 0827             Wheelchair Transfer    Type (Wheelchair Transfer)  sit-stand;stand-sit;stand pivot/stand step  -LG      [place] Level (Wheelchair Transfer)  verbal cues;minimum assist (75% patient effort);2 person assist  -LG      Recorded by [LG] Augustus Paulino, PTA 06/20/19 1044      Row Name 06/20/19 0827             Therapeutic Exercise    Comment (Therapeutic Exercise)  B LE AAROM x 15 reps  -LG      Recorded by [LG] Augustus Paulino, PTA 06/20/19 1044      Row Name 06/20/19 0827             Positioning and Restraints    Pre-Treatment Position  in bed  -LG      Post Treatment Position  bed  -LG      In Bed  fowlers;call light within  reach;encouraged to call for assist;with family/caregiver;with nsg  -LG      Recorded by [LG] Augustus Paulino, PTA 06/20/19 1044      Row Name                Wound 06/16/19 0957 Other (See comments) head incision    Wound - Properties Group Date first assessed: 06/16/19 [LC] Time first assessed: 0957 [LC] Side: Other (See comments) [LC] Location: head [LC] Type: incision [LC] Recorded by:  [LC] Keyla Fletcher RN 06/16/19 0957      User Key  (r) = Recorded By, (t) = Taken By, (c) = Cosigned By    Initials Name Effective Dates Discipline    LG Augustus Paulino, PTA 08/02/16 -  PT    LC Keyla Fletcher RN 08/02/16 -  Nurse          Wound 06/16/19 0957 Other (See comments) head incision (Active)   Dressing Appearance dry;intact;no drainage 6/20/2019  9:00 AM   Closure KARLEE 6/20/2019  9:00 AM   Base dressing in place, unable to visualize 6/20/2019  9:00 AM   Periwound intact;dry 6/20/2019  4:04 AM   Drainage Amount none 6/20/2019  4:04 AM   Care, Wound antimicrobial agent applied 6/19/2019  8:14 PM   Dressing Care, Wound gauze 6/20/2019  8:00 AM           Physical Therapy Education     Title: PT OT SLP Therapies (In Progress)     Topic: Physical Therapy (In Progress)     Point: Mobility training (Done)     Learning Progress Summary           Patient Acceptance, E, VU by SC at 6/18/2019  9:45 AM    Comment:  SPT/OT reviewed safety concerns and mobility deficits with pt and daughter while mobilizing pt in bed                   Point: Body mechanics (Done)     Learning Progress Summary           Patient Acceptance, E, VU by SC at 6/18/2019  9:45 AM    Comment:  SPT/OT reviewed safety concerns and mobility deficits with pt and daughter while mobilizing pt in bed                   Point: Precautions (Done)     Learning Progress Summary           Patient Acceptance, E, VU by SC at 6/18/2019  9:45 AM    Comment:  SPT/OT reviewed safety concerns and mobility deficits with pt and daughter while mobilizing pt in bed                                User Key     Initials Effective Dates Name Provider Type Discipline    SC 05/15/19 -  John Peace, PT Student PT Student PT                PT Recommendation and Plan     Plan of Care Reviewed With: patient, caregiver  Progress: improving  Outcome Summary: Pt more alert today but still needs extra time to process instructions. Min-Mod x 2 Supine to Sit EOB, Min-Mod x 2 Sit to Stand, Min A x 2 transfer steps from bed to W/C then W/C to bed.   Outcome Measures     Row Name 06/19/19 1400 06/18/19 1100 06/18/19 0815       How much help from another person do you currently need...    Turning from your back to your side while in flat bed without using bedrails?  --  --  2  -MARYCARMEN (r) SC (t) MARYCARMEN (c)    Moving from lying on back to sitting on the side of a flat bed without bedrails?  --  --  2  -MARYCARMEN (r) SC (t) MARYCARMEN (c)    Moving to and from a bed to a chair (including a wheelchair)?  --  --  1  -MARYCARMEN (r) SC (t) MARYCARMEN (c)    Standing up from a chair using your arms (e.g., wheelchair, bedside chair)?  --  --  1  -MARYCARMEN (r) SC (t) MARYCARMEN (c)    Climbing 3-5 steps with a railing?  --  --  1  -MARYCARMEN (r) SC (t) MARYCARMEN (c)    To walk in hospital room?  --  --  1  -MARYCARMEN (r) SC (t) MARYCARMEN (c)    AM-PAC 6 Clicks Score  --  --  8  -MARYCARMEN (r) SC (t)       How much help from another is currently needed...    Putting on and taking off regular lower body clothing?  1  -JJ  1  -JJ  --    Bathing (including washing, rinsing, and drying)  2  -JJ  2  -JJ  --    Toileting (which includes using toilet bed pan or urinal)  1  -JJ  1  -JJ  --    Putting on and taking off regular upper body clothing  2  -JJ  2  -JJ  --    Taking care of personal grooming (such as brushing teeth)  2  -JJ  2  -JJ  --    Eating meals  3  -JJ  2  -JJ  --    Score  11  -JJ  10  -JJ  --       Functional Assessment    Outcome Measure Options  AM-PAC 6 Clicks Daily Activity (OT)  -JJ  AM-PAC 6 Clicks Daily Activity (OT)  -JJ  AM-PAC 6 Clicks Basic Mobility (PT)  -MARYCARMEN (tl) SC (t) MARYCARMEN gautam)       User Key  (r) = Recorded By, (t) = Taken By, (c) = Cosigned By    Initials Name Provider Type    Davy Lozoya, PT DPT Physical Therapist    Ellen Bunch, OTR/L Occupational Therapist    SC John, PT Student PT Student         Time Calculation:   PT Charges     Row Name 06/20/19 0827             Time Calculation    Start Time  0827  -      Stop Time  0916  -      Time Calculation (min)  49 min  -LG      PT Received On  06/20/19  -      PT Goal Re-Cert Due Date  06/28/19  -         Time Calculation- PT    Total Timed Code Minutes- PT  49 minute(s)  -        User Key  (r) = Recorded By, (t) = Taken By, (c) = Cosigned By    Initials Name Provider Type     Augustus Paulino, PTA Physical Therapy Assistant        Therapy Charges for Today     Code Description Service Date Service Provider Modifiers Qty    61732818079 HC PT THERAPEUTIC ACT EA 15 MIN 6/19/2019 Augustus Paulino, PTA GP 2    68989818503 HC PT THER PROC EA 15 MIN 6/19/2019 Augustus Paulino, PTA GP 1    02003649065 HC PT THERAPEUTIC ACT EA 15 MIN 6/20/2019 Augustus Paulino, PTA GP 2    88682850173 HC PT THER PROC EA 15 MIN 6/20/2019 Augustus Paulino, PTA GP 1          PT G-Codes  Outcome Measure Options: AM-PAC 6 Clicks Daily Activity (OT)  AM-PAC 6 Clicks Score: 8  Score: 11    Augustus Paulino PTA  6/20/2019

## 2019-06-20 NOTE — THERAPY TREATMENT NOTE
Inpatient Rehabilitation - Physical Therapy Treatment Note  [facility]     Patient Name: Madi Mancini  : 1940  MRN: [id number]  Today's Date: 2019  Onset of Illness/Injury or Date of Surgery: 06/15/19  Date of Referral to PT: 19  Referring Physician: Dr. Bhat    Admit Date: 6/15/2019    Visit Dx:    ICD-10-CM ICD-9-CM   1. Bilateral subdural hematomas (CMS/HCC) S06.5X9A 432.1   2. Impaired physical mobility Z74.09 781.99   3. Decreased activities of daily living (ADL) R68.89 780.99     Patient Active Problem List   Diagnosis   • Bilateral subdural hematomas (CMS/HCC)       Therapy Treatment    Rehabilitation Treatment Summary     Row Name 19 1340 19 1317 19 0827       Treatment Time/Intention    Discipline  physical therapy assistant  -KJ  occupational therapist  -JJ  physical therapy assistant  -LG    Document Type  therapy note (daily note)  -KJ2  therapy note (daily note)  -JJ  therapy note (daily note)  -LG    Subjective Information  no complaints  -KJ2  complains of;weakness;fatigue  -JJ  complains of;weakness;fatigue  -LG2    Mode of Treatment  physical therapy  -KJ2  occupational therapy;individual therapy  -JJ  individual therapy;physical therapy  -LG2    Patient/Family Observations  daughter present  -KJ2  no family present in room   -JJ  daughter bedside  -LG2    Therapy Frequency (OT Eval)  --  5 times/wk  -JJ  --    Patient Effort  good  -KJ2  good  -JJ  --    Comment  BLE increased weakness  -KJ2  --  --    Existing Precautions/Restrictions  fall  -KJ2  fall  -JJ  fall  -LG2    Recorded by [KJ] Sara Meredith, PTA 19 1419  [KJ2] Sara Meredith, PTA 19 1436 [JJ] Ellen Beltran OTR/MARBELLA 19 1401 [LG] Augustus Paulino, PTA 19 0828  [LG2] Augustus Paulino, PTA 19 1041    Row Name 19 0827             Vital Signs    Pre Systolic BP Rehab  139  -LG      Pre Treatment Diastolic BP  90  -LG      Pretreatment Heart Rate (beats/min)  105  -LG       Pre SpO2 (%)  95  -LG      Recorded by [LG] Augustus Paulino, PTA 06/20/19 0828      Row Name 06/20/19 1340 06/20/19 1317 06/20/19 0827       Bed Mobility Assessment/Treatment    Bed Mobility Assessment/Treatment  --  supine-sit;sit-supine;rolling left;rolling right;scooting/bridging  -JJ  --    Rolling Left [place] (Bed Mobility)  --  moderate assist (50% patient effort);2 person assist  -JJ  --    Rolling Right [place] (Bed Mobility)  --  moderate assist (50% patient effort);2 person assist  -JJ  --    Scooting/Bridging [place] (Bed Mobility)  --  dependent (less than 25% patient effort);2 person assist  -JJ  dependent (less than 25% patient effort);2 person assist  -LG    Supine-Sit [place] (Bed Mobility)  verbal cues;moderate assist (50% patient effort);2 person assist  -KJ  verbal cues;moderate assist (50% patient effort);maximum assist (25% patient effort);2 person assist  -JJ  verbal cues;moderate assist (50% patient effort);maximum assist (25% patient effort);2 person assist  -LG    Sit-Supine [place] (Bed Mobility)  --  moderate assist (50% patient effort);2 person assist  -JJ  moderate assist (50% patient effort);2 person assist  -LG    Bed Mobility, Safety Issues  cognitive deficits limit understanding;decreased use of arms for pushing/pulling;decreased use of legs for bridging/pushing  -KJ  cognitive deficits limit understanding;decreased use of arms for pushing/pulling;decreased use of legs for bridging/pushing  -JJ  cognitive deficits limit understanding;decreased use of arms for pushing/pulling;decreased use of legs for bridging/pushing  -LG    Assistive Device (Bed Mobility)  --  draw sheet  -JJ  draw sheet  -LG    Comment (Bed Mobility)  sat EOB x 10 minutes; worked on BUE/LE AROM and trunk strengthening  -KJ  --  Sat EOB worked on sitting balance, reaching and LE Strengthening exercises  -LG    Recorded by [KJ] Sara Meredith, PTA 06/20/19 1436 [JJ] Ellen Beltran,  OTR/L 06/20/19 1401 [LG] Augustus Paulino, PTA 06/20/19 1041    Row Name 06/20/19 1317             Transfer Assessment/Treatment    Transfer Assessment/Treatment  sit-stand transfer;stand-sit transfer  -JJ      Recorded by [JJ] Ellen Beltran OTR/L 06/20/19 1401      Row Name 06/20/19 0827             Bed-Chair Transfer    Bed-Chair [place] (Transfers)  verbal cues;minimum assist (75% patient effort);2 person assist  -LG      Assistive Device (Bed-Chair Transfers)  -- HHA x 2  -LG      Recorded by [LG] Augustus Paulino, PTA 06/20/19 1044      Row Name 06/20/19 0827             Chair-Bed Transfer    Chair-Bed [place] (Transfers)  verbal cues;minimum assist (75% patient effort);stand by assist  -LG      Recorded by [LG] Augustus Paulino, PTA 06/20/19 1044      Row Name 06/20/19 1340 06/20/19 1317 06/20/19 0827       Sit-Stand Transfer    Sit-Stand [place] (Transfers)  moderate assist (50% patient effort);2 person assist  -KJ  minimum assist (75% patient effort);moderate assist (50% patient effort);2 person assist;verbal cues  -JJ  verbal cues;minimum assist (75% patient effort);moderate assist (50% patient effort);2 person assist  -LG    Assistive Device (Sit-Stand Transfers)  walker, front-wheeled  -KJ  --  --    Recorded by [KJ] Sara Meredith, PTA 06/20/19 1436 [JJ] Ellen Beltran OTR/L 06/20/19 1401 [LG] Augustus Paulino, PTA 06/20/19 1044    Row Name 06/20/19 1340 06/20/19 1317 06/20/19 0827       Stand-Sit Transfer    Stand-Sit [place] (Transfers)  verbal cues;moderate assist (50% patient effort);2 person assist  -KJ  minimum assist (75% patient effort);moderate assist (50% patient effort);2 person assist;verbal cues  -JJ  verbal cues;minimum assist (75% patient effort);2 person assist  -LG    Assistive Device (Stand-Sit Transfers)  walker, front-wheeled  -KJ  --  --    Recorded by [KJ] Sara Meredith, DEYANIRA 06/20/19 1436 [JJ] Ellen Beltran OTR/MARBELLA 06/20/19 1401 [LG] Augustus Paulino, PTA  06/20/19 1044    Row Name 06/20/19 1340             Stand Pivot/Stand Step Transfer    Stand Pivot/Stand Step [place]  moderate assist (50% patient effort);2 person assist;verbal cues  -KJ      Assistive Device (Stand Pivot Stand Step Transfer)  walker, front-wheeled  -KJ      Recorded by [KJ] Sara Meredith, PTA 06/20/19 1436      Row Name 06/20/19 0827             Wheelchair Transfer    Type (Wheelchair Transfer)  sit-stand;stand-sit;stand pivot/stand step  -LG      [place] Level (Wheelchair Transfer)  verbal cues;minimum assist (75% patient effort);2 person assist  -LG      Recorded by [LG] Augustus Paulino, PTA 06/20/19 1044      Row Name 06/20/19 1340             Gait/Stairs Assessment/Training    Comment (Gait/Stairs)  stood x 3, pivot to chair; flexed knees decreased use of walker  -KJ      Recorded by [KJ] Sara Meredith, PTA 06/20/19 1436      Row Name 06/20/19 1317             ADL Assessment/Intervention    98596 - OT Self Care/Mgmt Minutes  19  -JJ      Recorded by [JJ] Ellen Beltran OTR/L 06/20/19 1419      Row Name 06/20/19 1317             BADL Safety/Performance    Cognitive Impairments, BADL Safety/Performance  awareness, need for assistance;insight into deficits/self awareness;safety precaution awareness;problem solving/reasoning;judgment;safety precaution follow-through  -JJ      Skilled BADL Treatment/Intervention  BADL process/adaptation training  -JJ      Recorded by [JJ] Ellen Beltran OTR/L 06/20/19 1419      Row Name 06/20/19 1317             Therapeutic Exercise    36023 - OT Therapeutic Exercise Minutes  20  -JJ      Recorded by [JJ] Ellen Beltran OTR/L 06/20/19 1419      Row Name 06/20/19 1340 06/20/19 1317 06/20/19 0827       Therapeutic Exercise    Exercise Type (Therapeutic Exercise)  AROM (active range of motion)  -KJ  AROM (active range of motion);AAROM (active assistive range of motion) LUE AROM; RUE AAROM  -JJ  --    Position (Therapeutic Exercise)  seated   -KJ  seated  -JJ  --    Sets/Reps (Therapeutic Exercise)  15  -KJ  6  -JJ  --    Comment (Therapeutic Exercise)  --  --  B LE JEOM x 15 reps  -LG    Recorded by [KJ] Sara Meredith, PTA 06/20/19 1436 [JJ] Ellen Beltran OTR/MARBELLA 06/20/19 1419 [LG] Augustus Paulino, PTA 06/20/19 1044    Row Name 06/20/19 1317             Balance    Balance  static sitting balance;static standing balance  -JJ      Recorded by [JJ] Ellen Beltran OTR/L 06/20/19 1419      Row Name 06/20/19 1317             Static Sitting Balance    Level of [place] (Supported Sitting, Static Balance)  contact guard assist;moderate assist, 50 to 74% patient effort;2 person assist  -JJ      Sitting Position (Supported Sitting, Static Balance)  sitting on edge of bed  -JJ      Time Able to Maintain Position (Supported Sitting, Static Balance)  more than 5 minutes  -JJ      Comment (Supported Sitting, Static Balance)  flucuating assist   -JJ      Recorded by [JJ] Ellen Beltran OTR/L 06/20/19 1419      Row Name 06/20/19 1317             Static Standing Balance    Level of [place] (Supported Standing, Static Balance)  minimal assist, 75% patient effort;moderate assist, 50 to 74% patient effort;2 person assist  -JJ      Time Able to Maintain Position (Supported Standing, Static Balance)  15 to 30 seconds  -JJ      Assistive Device Utilized (Supported Standing, Static Balance)  other (see comments) HHAx2  -JJ      Recorded by [JJ] Ellen Beltran OTR/L 06/20/19 1419      Row Name 06/20/19 1340 06/20/19 1317 06/20/19 0827       Positioning and Restraints    Pre-Treatment Position  in bed  -KJ  in bed  -JJ  in bed  -LG    Post Treatment Position  chair  -KJ  bed  -JJ  bed  -LG    In Bed  encouraged to call for assist;with family/caregiver  -KJ  side lying right;call light within reach;encouraged to call for assist;exit alarm on;notified nsg;with family/caregiver;side rails up x2;SCD pump applied  -JJ  fowlers;call light within  reach;encouraged to call for assist;with family/caregiver;with nsg  -LG    Recorded by [KJ] Sara Meredith, PTA 06/20/19 1436 [JJ] Ellen Beltran OTR/MARBELLA 06/20/19 1419 [LG] Augustus Paulino, PTA 06/20/19 1044    Row Name 06/20/19 1317             Pain Assessment    Additional Documentation  Pain Scale: Numbers Pre/Post-Treatment (Group)  -JJ      Recorded by [JJ] Ellen Beltran OTR/L 06/20/19 1419      Row Name 06/20/19 1340 06/20/19 1317          Pain Scale: Numbers Pre/Post-Treatment    Pain Scale: Numbers, Pretreatment  3/10  -KJ  0/10 - no pain  -JJ     Pain Scale: Numbers, Post-Treatment  4/10  -KJ  0/10 - no pain  -JJ     Pain Location - Side  -- generalized  -KJ  --     Recorded by [KJ] Sara Meredith, PTA 06/20/19 1436 [JJ] Ellen Beltran OTR/L 06/20/19 1419     Row Name 06/20/19 1317             Sensory Assessment/Intervention    Sensory General Assessment  no sensation deficits identified  -JJ      Recorded by [JPABLO] Ellen Beltran OTR/L 06/20/19 1419      Row Name                Wound 06/16/19 0957 Other (See comments) head incision    Wound - Properties Group Date first assessed: 06/16/19 [LC] Time first assessed: 0957 [LC] Side: Other (See comments) [LC] Location: head [LC] Type: incision [LC] Recorded by:  [LC] Keyla Fletcher RN 06/16/19 0957    Row Name 06/20/19 1317             Coping    Verbalized Emotional State  acceptance  -JJ      Recorded by [JPABLO] Ellen Beltran OTR/L 06/20/19 1419      Row Name 06/20/19 1317             Plan of Care Review    Plan of Care Reviewed With  patient  -JJ      Recorded by [JPABLO] Ellen Beltran OTR/L 06/20/19 1419      Row Name 06/20/19 1317             Outcome Summary/Treatment Plan (OT)    Daily Summary of Progress (OT)  progress toward functional goals is good  -JJ      Anticipated Discharge Disposition (OT)  skilled nursing facility;inpatient rehabilitation facility  -CRISTIAN      Recorded by [CRISTIAN] Ellen Beltran OTR/MARBELLA 06/20/19 3168         User Key  (r) = Recorded By, (t) = Taken By, (c) = Cosigned By    Initials Name Effective Dates Discipline    LG Augustus Paulino R, PTA 08/02/16 -  PT    Sara Segura, PTA 08/02/16 -  PT    Keyla Hopkins, RN 08/02/16 -  Nurse    Ellen Bunch, OTR/L 10/12/18 -  OT          Wound 06/16/19 0957 Other (See comments) head incision (Active)   Dressing Appearance dry;intact;no drainage 6/20/2019  9:00 AM   Closure KARLEE 6/20/2019  9:00 AM   Base dressing in place, unable to visualize 6/20/2019  9:00 AM   Periwound intact;dry 6/20/2019  4:04 AM   Drainage Amount none 6/20/2019  4:04 AM   Care, Wound antimicrobial agent applied 6/19/2019  8:14 PM   Dressing Care, Wound gauze 6/20/2019  8:00 AM           Physical Therapy Education     Title: PT OT SLP Therapies (In Progress)     Topic: Physical Therapy (In Progress)     Point: Mobility training (Done)     Learning Progress Summary           Patient Acceptance, E, VU by SC at 6/18/2019  9:45 AM    Comment:  SPT/OT reviewed safety concerns and mobility deficits with pt and daughter while mobilizing pt in bed                   Point: Body mechanics (Done)     Learning Progress Summary           Patient Acceptance, E, VU by SC at 6/18/2019  9:45 AM    Comment:  SPT/OT reviewed safety concerns and mobility deficits with pt and daughter while mobilizing pt in bed                   Point: Precautions (Done)     Learning Progress Summary           Patient Acceptance, E, VU by SC at 6/18/2019  9:45 AM    Comment:  SPT/OT reviewed safety concerns and mobility deficits with pt and daughter while mobilizing pt in bed                               User Key     Initials Effective Dates Name Provider Type Discipline    SC 05/15/19 -  John Peace, PT Student PT Student PT                PT Recommendation and Plan        Outcome Measures     Row Name 06/20/19 1400 06/19/19 1400 06/18/19 1100       How much help from another is currently needed...    Putting on and  taking off regular lower body clothing?  1  -JJ  1  -JJ  1  -JJ    Bathing (including washing, rinsing, and drying)  2  -JJ  2  -JJ  2  -JJ    Toileting (which includes using toilet bed pan or urinal)  2  -JJ  1  -JJ  1  -JJ    Putting on and taking off regular upper body clothing  2  -JJ  2  -JJ  2  -JJ    Taking care of personal grooming (such as brushing teeth)  2  -JJ  2  -JJ  2  -JJ    Eating meals  3  -JJ  3  -JJ  2  -JJ    Score  12  -JJ  11  -JJ  10  -JJ       Functional Assessment    Outcome Measure Options  AM-PAC 6 Clicks Daily Activity (OT)  -JJ  AM-PAC 6 Clicks Daily Activity (OT)  -JJ  AM-PAC 6 Clicks Daily Activity (OT)  -JJ    Row Name 06/18/19 0815             How much help from another person do you currently need...    Turning from your back to your side while in flat bed without using bedrails?  2  -MARYCARMEN (r) SC (t) MARYCARMEN (c)      Moving from lying on back to sitting on the side of a flat bed without bedrails?  2  -MARYCARMEN (r) SC (t) MARYCARMEN (c)      Moving to and from a bed to a chair (including a wheelchair)?  1  -MARYCARMEN (r) SC (t) MARYCARMEN (c)      Standing up from a chair using your arms (e.g., wheelchair, bedside chair)?  1  -MARYCARMEN (r) SC (t) MARYCARMEN (c)      Climbing 3-5 steps with a railing?  1  -MARYCARMEN (r) SC (t) MARYCARMEN (c)      To walk in hospital room?  1  -MARYCARMEN (r) SC (t) MARYCARMEN (c)      AM-PAC 6 Clicks Score  8  -MARYCARMEN (r) SC (t)         Functional Assessment    Outcome Measure Options  AM-PAC 6 Clicks Basic Mobility (PT)  -MARYCARMEN (r) SC (t) MARYCARMEN (c)        User Key  (r) = Recorded By, (t) = Taken By, (c) = Cosigned By    Initials Name Provider Type    Davy Lozoya, PT DPT Physical Therapist    Ellen Bunch, OTR/L Occupational Therapist    John Bundy, PAULA Student PT Student         Time Calculation:   PT Charges     Row Name 06/20/19 1436 06/20/19 0827          Time Calculation    Start Time  1340  -KJ  0827  -LG     Stop Time  1411  -KJ  0916  -LG     Time Calculation (min)  31 min  -KJ  49 min  -LG     PT  Received On  06/20/19  -KJ  06/20/19  -LG     PT Goal Re-Cert Due Date  06/28/19  -KJ  06/28/19  -LG        Time Calculation- PT    Total Timed Code Minutes- PT  31 minute(s)  -KJ  49 minute(s)  -LG       User Key  (r) = Recorded By, (t) = Taken By, (c) = Cosigned By    Initials Name Provider Type     Augustus Paulino, PTA Physical Therapy Assistant    Sara Segura, DEYANIRA Physical Therapy Assistant        Therapy Charges for Today     Code Description Service Date Service Provider Modifiers Qty    31426154077 HC PT THERAPEUTIC ACT EA 15 MIN 6/20/2019 Sara Meredith, PTA GP 1    28699229259 HC PT THER PROC EA 15 MIN 6/20/2019 Sara Meredith, PTA GP 1          PT G-Codes  Outcome Measure Options: AM-PAC 6 Clicks Daily Activity (OT)  AM-PAC 6 Clicks Score: 8  Score: 12    Sara Meredith PTA  6/20/2019

## 2019-06-20 NOTE — PLAN OF CARE
Problem: Patient Care Overview  Goal: Plan of Care Review  Outcome: Ongoing (interventions implemented as appropriate)   06/20/19 8475   Coping/Psychosocial   Plan of Care Reviewed With daughter   Plan of Care Review   Progress improving   OTHER   Outcome Summary AOx4 but forgetful, confusion at times. no c/o pain. 2 hours on/off NRB mask alternating. no neuro changes. Dressing intact. . SCDs. VSS. Safety maintained.        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      Problem: Nutrition, Imbalanced: Inadequate Oral Intake (Adult)  Goal: Identify Related Risk Factors and Signs and Symptoms  Outcome: Ongoing (interventions implemented as appropriate)    Goal: Improved Oral Intake  Outcome: Ongoing (interventions implemented as appropriate)    Goal: Prevent Further Weight Loss  Outcome: Ongoing (interventions implemented as appropriate)

## 2019-06-20 NOTE — PLAN OF CARE
Problem: Patient Care Overview  Goal: Plan of Care Review  Outcome: Ongoing (interventions implemented as appropriate)   06/20/19 1421   Coping/Psychosocial   Plan of Care Reviewed With patient   Plan of Care Review   Progress improving   OTHER   Outcome Summary OT tx completed. Pt is much more alert and following commands this date. Able to complete supine <> sit with Mod A x2, rolling left/right Mod Ax2, scooting/brdiging is dependentx2. Pt complete AROM RUE and AAROM LUE 6 reps of shoulder flexion. Min/Mod A x2 for sit <> stand t/f from EOB x2 attempts. Pt would benefit from inpatient rehab upon d/c for continued rehab. Continue OT POC.

## 2019-06-20 NOTE — THERAPY TREATMENT NOTE
Acute Care - Occupational Therapy Treatment Note  [facility]     Patient Name: Madi Mancini  : 1940  MRN: [id number]  Today's Date: 2019  Onset of Illness/Injury or Date of Surgery: 06/15/19  Date of Referral to OT: 19  Referring Physician: Dr. Bhat    Admit Date: 6/15/2019       ICD-10-CM ICD-9-CM   1. Bilateral subdural hematomas (CMS/HCC) S06.5X9A 432.1   2. Impaired physical mobility Z74.09 781.99   3. Decreased activities of daily living (ADL) R68.89 780.99     Patient Active Problem List   Diagnosis   • Bilateral subdural hematomas (CMS/HCC)     Past Medical History:   Diagnosis Date   • Elevated cholesterol    • Hypertension      Past Surgical History:   Procedure Laterality Date   • APPENDECTOMY     • CRANIOTOMY FOR SUBDURAL HEMATOMA Bilateral 2019    Procedure: CRANIOTOMY FOR SUBDURAL HEMATOMA;  Surgeon: Galen Bhat MD;  Location: [facility];  Service: Neurosurgery   • UMBILICAL HERNIA REPAIR         Therapy Treatment    Rehabilitation Treatment Summary     Row Name 19 1340 19 1317 19 0827       Treatment Time/Intention    Discipline  physical therapy assistant  -KJ  occupational therapist  -JJ  physical therapy assistant  -LG    Document Type  --  therapy note (daily note)  -  therapy note (daily note)  -LG    Subjective Information  --  complains of;weakness;fatigue  -  complains of;weakness;fatigue  -LG2    Mode of Treatment  --  occupational therapy;individual therapy  -  individual therapy;physical therapy  -LG2    Patient/Family Observations  --  no family present in room   -  daughter bedside  -LG2    Therapy Frequency (OT Eval)  --  5 times/wk  -  --    Patient Effort  --  good  -  --    Existing Precautions/Restrictions  --  fall  -J  fall  -LG2    Recorded by [KJ] Sara Meredith, PTA 19 1419 [JJ] Ellen Beltran OTR/L 19 1401 [LG] Augustus Paulino, DEYANIRA 19 0828  [LG2] Augsutus Paulino, PTA 19 1041    Row Name  06/20/19 0827             Vital Signs    Pre Systolic BP Rehab  139  -LG      Pre Treatment Diastolic BP  90  -LG      Pretreatment Heart Rate (beats/min)  105  -LG      Pre SpO2 (%)  95  -LG      Recorded by [LG] Augustus Paulino PTA 06/20/19 0828      Row Name 06/20/19 1317 06/20/19 0827          Bed Mobility Assessment/Treatment    Bed Mobility Assessment/Treatment  supine-sit;sit-supine;rolling left;rolling right;scooting/bridging  -JJ  --     Rolling Left [place] (Bed Mobility)  moderate assist (50% patient effort);2 person assist  -JJ  --     Rolling Right [place] (Bed Mobility)  moderate assist (50% patient effort);2 person assist  -JJ  --     Scooting/Bridging [place] (Bed Mobility)  dependent (less than 25% patient effort);2 person assist  -JJ  dependent (less than 25% patient effort);2 person assist  -LG     Supine-Sit [place] (Bed Mobility)  verbal cues;moderate assist (50% patient effort);maximum assist (25% patient effort);2 person assist  -JJ  verbal cues;moderate assist (50% patient effort);maximum assist (25% patient effort);2 person assist  -LG     Sit-Supine [place] (Bed Mobility)  moderate assist (50% patient effort);2 person assist  -JJ  moderate assist (50% patient effort);2 person assist  -LG     Bed Mobility, Safety Issues  cognitive deficits limit understanding;decreased use of arms for pushing/pulling;decreased use of legs for bridging/pushing  -JJ  cognitive deficits limit understanding;decreased use of arms for pushing/pulling;decreased use of legs for bridging/pushing  -LG     Assistive Device (Bed Mobility)  draw sheet  -JJ  draw sheet  -LG     Comment (Bed Mobility)  --  Sat EOB worked on sitting balance, reaching and LE Strengthening exercises  -LG     Recorded by [JJ] Ellne Beltran OTR/L 06/20/19 1401 [LG] Augustus Paulino PTA 06/20/19 1041     Row Name 06/20/19 1317             Transfer Assessment/Treatment    Transfer Assessment/Treatment  sit-stand  transfer;stand-sit transfer  -JJ      Recorded by [JJ] Ellen Beltran OTR/L 06/20/19 1401      Row Name 06/20/19 0827             Bed-Chair Transfer    Bed-Chair [place] (Transfers)  verbal cues;minimum assist (75% patient effort);2 person assist  -LG      Assistive Device (Bed-Chair Transfers)  -- HHA x 2  -LG      Recorded by [LG] Augustus Paulino, PTA 06/20/19 1044      Row Name 06/20/19 0827             Chair-Bed Transfer    Chair-Bed [place] (Transfers)  verbal cues;minimum assist (75% patient effort);stand by assist  -LG      Recorded by [LG] Augustus Paulino, PTA 06/20/19 1044      Row Name 06/20/19 1317 06/20/19 0827          Sit-Stand Transfer    Sit-Stand [place] (Transfers)  minimum assist (75% patient effort);moderate assist (50% patient effort);2 person assist;verbal cues  -JJ  verbal cues;minimum assist (75% patient effort);moderate assist (50% patient effort);2 person assist  -LG     Recorded by [JJ] Ellen Beltran OTR/L 06/20/19 1401 [LG] Augustus Paulino, PTA 06/20/19 1044     Row Name 06/20/19 1317 06/20/19 0827          Stand-Sit Transfer    Stand-Sit [place] (Transfers)  minimum assist (75% patient effort);moderate assist (50% patient effort);2 person assist;verbal cues  -JJ  verbal cues;minimum assist (75% patient effort);2 person assist  -LG     Recorded by [JJ] Ellen Beltran OTR/L 06/20/19 1401 [LG] Augustus Paulino, PTA 06/20/19 1044     Row Name 06/20/19 0827             Wheelchair Transfer    Type (Wheelchair Transfer)  sit-stand;stand-sit;stand pivot/stand step  -LG      [place] Level (Wheelchair Transfer)  verbal cues;minimum assist (75% patient effort);2 person assist  -LG      Recorded by [LG] Augustus Paulino, PTA 06/20/19 1044      Row Name 06/20/19 1317             ADL Assessment/Intervention    14940 - OT Self Care/Mgmt Minutes  19  -JJ      Recorded by [JJ] Ellen Beltran, ARISTEOR/L 06/20/19 1419      Row Name 06/20/19 1317             FANIL  Safety/Performance    Cognitive Impairments, BADL Safety/Performance  awareness, need for assistance;insight into deficits/self awareness;safety precaution awareness;problem solving/reasoning;judgment;safety precaution follow-through  -JJ      Skilled BADL Treatment/Intervention  BADL process/adaptation training  -JJ      Recorded by [JJ] Ellen Beltran OTR/L 06/20/19 1419      Row Name 06/20/19 1317             Therapeutic Exercise    70667 - OT Therapeutic Exercise Minutes  20  -JJ      Recorded by [JJ] Ellen Beltran OTR/L 06/20/19 1419      Row Name 06/20/19 1317 06/20/19 0827          Therapeutic Exercise    Exercise Type (Therapeutic Exercise)  AROM (active range of motion);AAROM (active assistive range of motion) LUE AROM; RUE AAROM  -JJ  --     Position (Therapeutic Exercise)  seated  -JJ  --     Sets/Reps (Therapeutic Exercise)  6  -JJ  --     Comment (Therapeutic Exercise)  --  B LE AAROM x 15 reps  -LG     Recorded by [JJ] Ellen Beltran OTR/MARBELLA 06/20/19 1419 [LG] Augustus Paulino, PTA 06/20/19 1044     Row Name 06/20/19 1317             Balance    Balance  static sitting balance;static standing balance  -JJ      Recorded by [JJ] Ellen Beltran OTR/L 06/20/19 1419      Row Name 06/20/19 1317             Static Sitting Balance    Level of [place] (Supported Sitting, Static Balance)  contact guard assist;moderate assist, 50 to 74% patient effort;2 person assist  -JJ      Sitting Position (Supported Sitting, Static Balance)  sitting on edge of bed  -JJ      Time Able to Maintain Position (Supported Sitting, Static Balance)  more than 5 minutes  -JJ      Comment (Supported Sitting, Static Balance)  flucuating assist   -JJ      Recorded by [JJ] Ellen Beltran OTR/L 06/20/19 1419      Row Name 06/20/19 1317             Static Standing Balance    Level of [place] (Supported Standing, Static Balance)  minimal assist, 75% patient effort;moderate assist, 50 to 74% patient effort;2  person assist  -JJ      Time Able to Maintain Position (Supported Standing, Static Balance)  15 to 30 seconds  -JJ      Assistive Device Utilized (Supported Standing, Static Balance)  other (see comments) HHAx2  -JJ      Recorded by [JPABLO] Ellen Beltran OTR/L 06/20/19 1419      Row Name 06/20/19 1317 06/20/19 0827          Positioning and Restraints    Pre-Treatment Position  in bed  -JJ  in bed  -LG     Post Treatment Position  bed  -JJ  bed  -LG     In Bed  side lying right;call light within reach;encouraged to call for assist;exit alarm on;notified nsg;with family/caregiver;side rails up x2;SCD pump applied  -JJ  fowlers;call light within reach;encouraged to call for assist;with family/caregiver;with nsg  -LG     Recorded by [JJ] Ellen Beltran OTR/MARBELLA 06/20/19 1419 [LG] Augustus Paulino, DEYANIRA 06/20/19 1044     Row Name 06/20/19 1317             Pain Assessment    Additional Documentation  Pain Scale: Numbers Pre/Post-Treatment (Group)  -JJ      Recorded by [JPABLO] Ellen Beltran OTR/L 06/20/19 1419      Row Name 06/20/19 1317             Pain Scale: Numbers Pre/Post-Treatment    Pain Scale: Numbers, Pretreatment  0/10 - no pain  -JJ      Pain Scale: Numbers, Post-Treatment  0/10 - no pain  -JJ      Recorded by [JPABLO] Ellen Beltran OTR/L 06/20/19 1419      Row Name 06/20/19 1317             Sensory Assessment/Intervention    Sensory General Assessment  no sensation deficits identified  -JJ      Recorded by [JPABLO] Ellen Beltran OTR/L 06/20/19 1419      Row Name                Wound 06/16/19 0957 Other (See comments) head incision    Wound - Properties Group Date first assessed: 06/16/19 [LC] Time first assessed: 0957 [LC] Side: Other (See comments) [LC] Location: head [LC] Type: incision [LC] Recorded by:  [LC] Keyla Fletcher RN 06/16/19 0957    Row Name 06/20/19 1317             Coping    Verbalized Emotional State  acceptance  -JJ      Recorded by [JJ] Ellen Beltran OTR/MARBELLA 06/20/19 1418       Row Name 06/20/19 1317             Plan of Care Review    Plan of Care Reviewed With  patient  -JJ      Recorded by [CRISTIAN] Ellen Beltran OTR/L 06/20/19 1419      Row Name 06/20/19 1317             Outcome Summary/Treatment Plan (OT)    Daily Summary of Progress (OT)  progress toward functional goals is good  -JJ      Anticipated Discharge Disposition (OT)  skilled nursing facility;inpatient rehabilitation facility  -JJ      Recorded by [CRISTIAN] Ellen Beltran OTR/MARBELLA 06/20/19 1419        User Key  (r) = Recorded By, (t) = Taken By, (c) = Cosigned By    Initials Name Effective Dates Discipline    LG Augustus Paulino R, PTA 08/02/16 -  PT    Sara Segura, PTA 08/02/16 -  PT    Keyla Hopkins, RN 08/02/16 -  Nurse    Ellen Bunch OTR/L 10/12/18 -  OT        Wound 06/16/19 0957 Other (See comments) head incision (Active)   Dressing Appearance dry;intact;no drainage 6/20/2019  9:00 AM   Closure KARLEE 6/20/2019  9:00 AM   Base dressing in place, unable to visualize 6/20/2019  9:00 AM   Periwound intact;dry 6/20/2019  4:04 AM   Drainage Amount none 6/20/2019  4:04 AM   Care, Wound antimicrobial agent applied 6/19/2019  8:14 PM   Dressing Care, Wound gauze 6/20/2019  8:00 AM       Occupational Therapy Education     Title: PT OT SLP Therapies (In Progress)     Topic: Occupational Therapy (In Progress)     Point: ADL training (Done)     Description: Instruct learner(s) on proper safety adaptation and remediation techniques during self care or transfers.   Instruct in proper use of assistive devices.    Learning Progress Summary           Patient Acceptance, E, VU by CRISTIAN at 6/18/2019 11:40 AM    Comment:  OT POC, adls, bed mobility, activity tolerance.   Family Acceptance, E, VU by CRISTIAN at 6/18/2019 11:40 AM    Comment:  OT POC, adls, bed mobility, activity tolerance.                               User Key     Initials Effective Dates Name Provider Type Discipline    PABLO 10/12/18 -  Ellen Beltran OTR/MARBELLA  Occupational Therapist OT                OT Recommendation and Plan  Outcome Summary/Treatment Plan (OT)  Daily Summary of Progress (OT): progress toward functional goals is good  Anticipated Discharge Disposition (OT): skilled nursing facility, inpatient rehabilitation facility  Planned Therapy Interventions (OT Eval): activity tolerance training, adaptive equipment training, BADL retraining, functional balance retraining, occupation/activity based interventions, patient/caregiver education/training, strengthening exercise, transfer/mobility retraining  Therapy Frequency (OT Eval): 5 times/wk  Daily Summary of Progress (OT): progress toward functional goals is good  Plan of Care Review  Plan of Care Reviewed With: patient  Plan of Care Reviewed With: patient  Outcome Summary: OT tx completed. Pt is much more alert and following commands this date. Able to complete supine <> sit with Mod A x2, rolling left/right Mod Ax2, scooting/brdiging is dependentx2. Pt complete AROM RUE and AAROM LUE 6 reps of shoulder flexion. Min/Mod A x2 for sit <> stand t/f from EOB x2 attempts. Pt would benefit from inpatient rehab upon d/c for continued rehab. Continue OT POC.    Outcome Measures     Row Name 06/20/19 1400 06/19/19 1400 06/18/19 1100       How much help from another is currently needed...    Putting on and taking off regular lower body clothing?  1  -JJ  1  -JJ  1  -JJ    Bathing (including washing, rinsing, and drying)  2  -JJ  2  -JJ  2  -JJ    Toileting (which includes using toilet bed pan or urinal)  2  -JJ  1  -JJ  1  -JJ    Putting on and taking off regular upper body clothing  2  -JJ  2  -JJ  2  -JJ    Taking care of personal grooming (such as brushing teeth)  2  -JJ  2  -JJ  2  -JJ    Eating meals  3  -JJ  3  -JJ  2  -JJ    Score  12  -JJ  11  -JJ  10  -JJ       Functional Assessment    Outcome Measure Options  AM-PAC 6 Clicks Daily Activity (OT)  -JJ  AM-PAC 6 Clicks Daily Activity (OT)  -JJ  AM-PAC 6 Clicks  Daily Activity (OT)  -JJ    Row Name 06/18/19 0815             How much help from another person do you currently need...    Turning from your back to your side while in flat bed without using bedrails?  2  -MARYCARMEN (r) SC (t) MARYCARMEN (c)      Moving from lying on back to sitting on the side of a flat bed without bedrails?  2  -MARYCARMEN (r) SC (t) MARYCARMEN (c)      Moving to and from a bed to a chair (including a wheelchair)?  1  -MARYCARMEN (r) SC (t) MARYCARMEN (c)      Standing up from a chair using your arms (e.g., wheelchair, bedside chair)?  1  -MARYCARMEN (r) SC (t) MARYCARMEN (c)      Climbing 3-5 steps with a railing?  1  -MARYCARMEN (r) SC (t) MARYCARMEN (c)      To walk in hospital room?  1  -MARYCARMEN (r) SC (t) MARYCARMEN (c)      AM-PAC 6 Clicks Score  8  -MARYCARMEN (r) SC (t)         Functional Assessment    Outcome Measure Options  AM-PAC 6 Clicks Basic Mobility (PT)  -MARYCARMEN (r) SC (t) MARYCARMEN (c)        User Key  (r) = Recorded By, (t) = Taken By, (c) = Cosigned By    Initials Name Provider Type    Davy Lozoya, PT DPT Physical Therapist    Ellen Bunch, OTR/L Occupational Therapist    John Bundy, PT Student PT Student           Time Calculation:   Time Calculation- OT     Row Name 06/20/19 1401 06/20/19 1317          Time Calculation- OT    OT Start Time  1314  -JJ  --     OT Stop Time  1353  -JJ  --     OT Time Calculation (min)  39 min  -JJ  --     Total Timed Code Minutes- OT  39 minute(s)  -J  --     OT Received On  06/20/19  -J  --        Timed Charges    79919 - OT Therapeutic Exercise Minutes  --  20 -JJ     54536 - OT Self Care/Mgmt Minutes  --  19 -JJ       User Key  (r) = Recorded By, (t) = Taken By, (c) = Cosigned By    Initials Name Provider Type    Ellen Bunch, OTR/L Occupational Therapist        Therapy Charges for Today     Code Description Service Date Service Provider Modifiers Qty    87234854920 HC OT SELF CARE/MGMT/TRAIN EA 15 MIN 6/19/2019 Ellen Beltran OTR/L GO 2    22741830805 HC OT THER PROC EA 15 MIN 6/20/2019 Ruby,  NELLIE Hughes/L GO 2    51276555945 HC OT SELF CARE/MGMT/TRAIN EA 15 MIN 6/20/2019 Ellen Beltran OTR/L GO 1               NELLIE Ramirez/MARBELLA  6/20/2019

## 2019-06-20 NOTE — PLAN OF CARE
Problem: Patient Care Overview  Goal: Plan of Care Review  Outcome: Ongoing (interventions implemented as appropriate)   06/20/19 7734   Coping/Psychosocial   Plan of Care Reviewed With patient   Plan of Care Review   Progress improving   OTHER   Outcome Summary Pt disoriented to situation, PERRLA, follows commands,  equal. VSS. No fluids currently infusing. Pt on non-rebreater q2 hrs. Urine output adequate, afebrile. Pain med given x1 for head incisional pain. Will continue to monitor.

## 2019-06-20 NOTE — PLAN OF CARE
Problem: Patient Care Overview  Goal: Plan of Care Review  Outcome: Ongoing (interventions implemented as appropriate)   06/20/19 1284   Coping/Psychosocial   Plan of Care Reviewed With patient;caregiver   Plan of Care Review   Progress improving   OTHER   Outcome Summary Pt more alert today but still needs extra time to process instructions. Min-Mod x 2 Supine to Sit EOB, Min-Mod x 2 Sit to Stand, Min A x 2 transfer steps from bed to W/C then W/C to bed.

## 2019-06-21 PROCEDURE — 97530 THERAPEUTIC ACTIVITIES: CPT

## 2019-06-21 PROCEDURE — 99024 POSTOP FOLLOW-UP VISIT: CPT | Performed by: NURSE PRACTITIONER

## 2019-06-21 PROCEDURE — 97110 THERAPEUTIC EXERCISES: CPT

## 2019-06-21 PROCEDURE — 97116 GAIT TRAINING THERAPY: CPT

## 2019-06-21 PROCEDURE — 94760 N-INVAS EAR/PLS OXIMETRY 1: CPT

## 2019-06-21 PROCEDURE — 97535 SELF CARE MNGMENT TRAINING: CPT

## 2019-06-21 PROCEDURE — 94799 UNLISTED PULMONARY SVC/PX: CPT

## 2019-06-21 RX ADMIN — SODIUM CHLORIDE, PRESERVATIVE FREE 3 ML: 5 INJECTION INTRAVENOUS at 21:46

## 2019-06-21 RX ADMIN — SODIUM CHLORIDE, PRESERVATIVE FREE 3 ML: 5 INJECTION INTRAVENOUS at 09:22

## 2019-06-21 RX ADMIN — HYDROCODONE BITARTRATE AND ACETAMINOPHEN 1 TABLET: 5; 325 TABLET ORAL at 11:14

## 2019-06-21 RX ADMIN — HYDROCODONE BITARTRATE AND ACETAMINOPHEN 1 TABLET: 5; 325 TABLET ORAL at 21:55

## 2019-06-21 NOTE — PLAN OF CARE
Problem: Patient Care Overview  Goal: Plan of Care Review  Outcome: Ongoing (interventions implemented as appropriate)   06/21/19 1207   Plan of Care Review   Progress no change   OTHER   Outcome Summary RD nutrition follow -up completed. Pt's po intake remains fair with average of 42% of the last 3 meals consumed. Unable to speak with pt and/or family since staff in the room at time of RD visit. Plans are for pt to possibly go to a NH after hospital d/c       Problem: Nutrition, Imbalanced: Inadequate Oral Intake (Adult)  Goal: Identify Related Risk Factors and Signs and Symptoms  Outcome: Ongoing (interventions implemented as appropriate)   06/21/19 1207   Nutrition, Imbalanced: Inadequate Oral Intake (Adult)   Related Risk Factors (Nutrition Imbalance, Inadequate Oral Intake) appetite decreased   Signs and Symptoms (Nutrition Imbalance, Inadequate Oral Intake: Signs and Symptoms) other (see comments)  (PO intake average of 42% of the last 3 meals)

## 2019-06-21 NOTE — PROGRESS NOTES
"Madi Mancini  79 y.o.      Chief complaint:   Status post craniotomy for subdural hematoma    Subjective  No events    Temp:  [97.2 °F (36.2 °C)-98.4 °F (36.9 °C)] 98.3 °F (36.8 °C)  Heart Rate:  [] 108  Resp:  [18-28] 22  BP: (103-142)/(70-95) 113/77      Objective:  Vital signs: (most recent): Blood pressure 113/77, pulse 108, temperature 98.3 °F (36.8 °C), temperature source Axillary, resp. rate 22, height 167.6 cm (66\"), weight 103 kg (227 lb 4.7 oz), SpO2 94 %.    Lungs:  Normal effort.  He is not in respiratory distress.    Heart: Normal rate.  Regular rhythm.    Abdomen: Abdomen is soft and non-distended.        Neurologic Exam     Mental Status   Oriented to person, place, and time.   Attention: normal. Concentration: normal.   Speech: speech is normal   Level of consciousness: alert    Cranial Nerves   Cranial nerves II through XII intact.     Motor Exam   Muscle bulk: normal  Overall muscle tone: normal    Strength   Strength 5/5 throughout. CLEMENT symmetrically, follows commands     Sensory Exam   Light touch normal.   Pinprick normal.     Gait, Coordination, and Reflexes     Tremor   Resting tremor: absent  Intention tremor: absent  Action tremor: absent        Bilateral subdural hematomas (CMS/HCC)      Lab Results (last 24 hours)     ** No results found for the last 24 hours. **              Plan:   Patient doing well.  Continue with physical and occupational therapy.  We can stop doing the nonrebreather at this time.   to see for SNF placement    ALONDRA Richardson    "

## 2019-06-21 NOTE — PROGRESS NOTES
Continued Stay Note   [place]     Patient Name: Madi Mancini  MRN: [id number]  Today's Date: 6/21/2019    Admit Date: 6/15/2019    Discharge Plan     Row Name 06/21/19 1414       Plan    Plan Comments  [facility] CAN OFFER BED PER [place] ([phone]). PHYSICIAN AWARE AND DC IS PLANNED FOR MONDAY. WILL FOLLOW FOR DC ORDERS ON MONDAY         Discharge Codes    No documentation.             HALIMA Chaudhary

## 2019-06-21 NOTE — PLAN OF CARE
Problem: Patient Care Overview  Goal: Plan of Care Review  Outcome: Ongoing (interventions implemented as appropriate)   06/21/19 3095   Coping/Psychosocial   Plan of Care Reviewed With patient;daughter   Plan of Care Review   Progress improving   OTHER   Outcome Summary Pt sitting up in bedside chair. Min-Mod x 2 sit to stand, ,Walked approx 10' with CGA-Max x 2 as pts knees fatigued, sitting rest then ambulated additional 4'. Pt completed strength training sitting eob. Very good candidate for Rehab.

## 2019-06-21 NOTE — PROGRESS NOTES
Continued Stay Note   [place]     Patient Name: Madi Mancini  MRN: [id number]  Today's Date: 6/21/2019    Admit Date: 6/15/2019    Discharge Plan     Row Name 06/21/19 0915       Plan    Plan Comments  Daughter/family/patient requesting referral to [facility] in [place]. CM unable to reach SNF. SW notified and will contact admissions and proceed with referral. Patient could be ready for dc today per MD. pending repeat CT scan of head.          Discharge Codes    No documentation.             Haydee Gutierrez RN

## 2019-06-21 NOTE — THERAPY TREATMENT NOTE
Acute Care - Physical Therapy Treatment Note  [facility]     Patient Name: Madi Mancini  : 1940  MRN: [id number]  Today's Date: 2019  Onset of Illness/Injury or Date of Surgery: 06/15/19  Date of Referral to PT: 19  Referring Physician: Dr. Bhat    Admit Date: 6/15/2019    Visit Dx:    ICD-10-CM ICD-9-CM   1. Bilateral subdural hematomas (CMS/HCC) S06.5X9A 432.1   2. Impaired physical mobility Z74.09 781.99   3. Decreased activities of daily living (ADL) R68.89 780.99     Patient Active Problem List   Diagnosis   • Bilateral subdural hematomas (CMS/HCC)       Therapy Treatment    Rehabilitation Treatment Summary     Row Name 19 1109 19 1012 19 0952       Treatment Time/Intention    Discipline  physical therapy assistant  -LG  --  -LG  occupational therapy assistant  -TS    Document Type  therapy note (daily note)  -LG2  --  -LG  therapy note (daily note)  -TS    Subjective Information  complains of;weakness;fatigue;pain  -LG2  --  complains of;weakness;fatigue  -TS2    Mode of Treatment  individual therapy;physical therapy  -LG2  --  --    Patient/Family Observations  daughter present  -LG2  --  --    Patient Effort  good  -LG2  --  good  -TS2    Comment  considerable time spent educating and encourging pt on safety, importance of rehab and progression back to independence, pt's daughter present during all of treatment.   -LG3  --  --    Existing Precautions/Restrictions  fall  -LG2  --  fall  -TS2    Recorded by [LG] Augustus Paulino, PTA 19 1110  [LG2] Augustus Paulino, PTA 19 1214  [LG3] Augustus Paulino, PTA 19 1324 [LG] Augustus Paulino, PTA 19 1059 [TS] Zaria Pack, ARREDONDO/L 19 1002  [TS2] Zaria Pack, ARREDONDO/L 19 1158    Row Name 19 0828             Treatment Time/Intention    Discipline  physical therapy assistant  -LG      Comment  Pt just started eating breakfast. PT will check back  -LG      Recorded by [LG] Augustus Paulino  PTA 06/21/19 0828      Row Name 06/21/19 0952             Cognitive Assessment/Intervention- PT/OT    Personal Safety Interventions  fall prevention program maintained;gait belt;nonskid shoes/slippers when out of bed  -TS      Recorded by [TS] Zaria Pack ARREDONDO/L 06/21/19 1158      Row Name 06/21/19 1109             Bed Mobility Assessment/Treatment    Sit-Supine [place] (Bed Mobility)  verbal cues;minimum assist (75% patient effort);moderate assist (50% patient effort)  -LG      Bed Mobility, Safety Issues  cognitive deficits limit understanding;decreased use of arms for pushing/pulling;decreased use of legs for bridging/pushing  -LG      Recorded by [LG] Augustus Paulino, PTA 06/21/19 1214      Row Name 06/21/19 0952             Transfer Assessment/Treatment    Transfer Assessment/Treatment  sit-stand transfer;stand-sit transfer;toilet transfer  -TS      Recorded by [TS] Zaria Pack ARREDONDO/L 06/21/19 1158      Row Name 06/21/19 1109 06/21/19 0952          Sit-Stand Transfer    Sit-Stand [place] (Transfers)  verbal cues;minimum assist (75% patient effort);moderate assist (50% patient effort);2 person assist  -LG  minimum assist (75% patient effort);moderate assist (50% patient effort);2 person assist  -TS     Assistive Device (Sit-Stand Transfers)  walker, front-wheeled  -LG  walker, front-wheeled  -TS     Recorded by [LG] Augustus Paulino, PTA 06/21/19 1214 [TS] Zaria Pack ARREDONDO/L 06/21/19 1158     Row Name 06/21/19 1109 06/21/19 0952          Stand-Sit Transfer    Stand-Sit [place] (Transfers)  verbal cues;minimum assist (75% patient effort);moderate assist (50% patient effort)  -LG  minimum assist (75% patient effort);2 person assist  -TS     Assistive Device (Stand-Sit Transfers)  walker, front-wheeled  -LG  walker, front-wheeled  -TS     Recorded by [LG] Augustus Paulino, PTA 06/21/19 1214 [TS] Zaria Pack ARREDONDO/L 06/21/19 1158     Row Name 06/21/19 0952              Toilet Transfer    Type (Toilet Transfer)  sit-stand;stand-sit  -TS      [place] Level (Toilet Transfer)  moderate assist (50% patient effort)  -TS      Assistive Device (Toilet Transfer)  commode;grab bars/safety frame  -TS      Recorded by [TS] Zaria Pack COTA/L 06/21/19 1158      Row Name 06/21/19 1109             Gait/Stairs Assessment/Training    Gait/Stairs Assessment/Training  gait/ambulation assistive device  -LG      [place] Level (Gait)  verbal cues;contact guard;maximum assist (25% patient effort);2 person assist  -LG      Assistive Device (Gait)  walker, front-wheeled  -LG      Distance in Feet (Gait)  10' sitting rest then 4'  -LG      Pattern (Gait)  step-to  -LG      Deviations/Abnormal Patterns (Gait)  bilateral deviations;base of support, wide;festinating/shuffling;gait speed decreased;stride length decreased  -LG      Bilateral Gait Deviations  forward flexed posture;knee buckling, bilateral  -LG      Comment (Gait/Stairs)  Pt walked approx 10' with constant vc's, then knees started to buckle and required Max x 2 to get seated in chair safetly. Pt rested then ambulated additional 4 feet before sitting on bed to rest.   -LG      Recorded by [LG] Augustus Paulino, PTA 06/21/19 1324      Row Name 06/21/19 1109             Therapeutic Exercise    Comment (Therapeutic Exercise)  B LE AROM 2 sets of 10 reps. APs, LAQs, Resisted Hip Abd/Add, Marching. Then did 4 sit to stands bedside.   -LG      Recorded by [LG] Augustus Paulino, PTA 06/21/19 1324      Row Name 06/21/19 1109 06/21/19 0952          Positioning and Restraints    Pre-Treatment Position  sitting in chair/recliner  -LG  in bed  -TS     Post Treatment Position  bed  -LG  chair  -TS     In Bed  fowlers;call light within reach;encouraged to call for assist;patient within staff view;with family/caregiver  -LG  --     In Chair  --  sitting;call light within reach;encouraged to call for assist  -TS     Recorded by [LG] Augustus Paulino,  PTA 06/21/19 1324 [TS] Zaria Pack, ARREDONDO/L 06/21/19 1158     Row Name 06/21/19 1109 06/21/19 0952          Pain Scale: Numbers Pre/Post-Treatment    Pain Scale: Numbers, Pretreatment  0/10 - no pain  -LG  0/10 - no pain  -TS     Pain Scale: Numbers, Post-Treatment  0/10 - no pain  -LG  0/10 - no pain  -TS     Recorded by [LG] Augustus Paulino, PTA 06/21/19 1324 [TS] Zaria Pack, ARREDONDO/L 06/21/19 1158     Row Name                Wound 06/16/19 0957 Other (See comments) head incision    Wound - Properties Group Date first assessed: 06/16/19 [LC] Time first assessed: 0957 [LC] Side: Other (See comments) [LC] Location: head [LC] Type: incision [LC] Recorded by:  [LC] Keyla Fletcher RN 06/16/19 0957    Row Name 06/21/19 0952             Outcome Summary/Treatment Plan (OT)    Daily Summary of Progress (OT)  progress toward functional goals is good  -TS      Recorded by [TS] Zaria Pack, ARREDONDO/L 06/21/19 1158        User Key  (r) = Recorded By, (t) = Taken By, (c) = Cosigned By    Initials Name Effective Dates Discipline    LG Augustus Paulino, PTA 08/02/16 -  PT    TS Zaria Pack, ARREDONDO/L 08/02/16 -  OT    LC Keyla Fletcher RN 08/02/16 -  Nurse          Wound 06/16/19 0957 Other (See comments) head incision (Active)   Dressing Appearance open to air 6/21/2019  9:20 AM   Closure Sutures 6/21/2019  9:20 AM   Base dry 6/21/2019  9:20 AM   Periwound intact;dry 6/21/2019  9:20 AM   Drainage Amount none 6/21/2019  9:20 AM   Dressing Care, Wound open to air 6/21/2019  9:20 AM           Physical Therapy Education     Title: PT OT SLP Therapies (In Progress)     Topic: Physical Therapy (In Progress)     Point: Mobility training (Done)     Learning Progress Summary           Patient Acceptance, E, VU by SC at 6/18/2019  9:45 AM    Comment:  SPT/OT reviewed safety concerns and mobility deficits with pt and daughter while mobilizing pt in bed                   Point: Body mechanics (Done)     Learning  Progress Summary           Patient Acceptance, E, VU by SC at 6/18/2019  9:45 AM    Comment:  SPT/OT reviewed safety concerns and mobility deficits with pt and daughter while mobilizing pt in bed                   Point: Precautions (Done)     Learning Progress Summary           Patient Acceptance, E, VU by SC at 6/18/2019  9:45 AM    Comment:  SPT/OT reviewed safety concerns and mobility deficits with pt and daughter while mobilizing pt in bed                               User Key     Initials Effective Dates Name Provider Type Discipline    SC 05/15/19 -  John Peace, PT Student PT Student PT                PT Recommendation and Plan     Plan of Care Reviewed With: patient, daughter  Progress: improving  Outcome Summary: Pt sitting up in bedside chair. Min-Mod x 2 sit to stand, ,Walked approx 10' with CGA-Max x 2 as pts knees fatigued, sitting rest then ambulated additional 4'. Pt completed strength training sitting eob. Very good candidate for Rehab.   Outcome Measures     Row Name 06/20/19 1400 06/19/19 1400          How much help from another is currently needed...    Putting on and taking off regular lower body clothing?  1  -JJ  1  -JJ     Bathing (including washing, rinsing, and drying)  2  -JJ  2  -JJ     Toileting (which includes using toilet bed pan or urinal)  2  -JJ  1  -JJ     Putting on and taking off regular upper body clothing  2  -JJ  2  -JJ     Taking care of personal grooming (such as brushing teeth)  2  -JJ  2  -JJ     Eating meals  3  -JJ  3  -JJ     Score  12  -JJ  11  -JJ        Functional Assessment    Outcome Measure Options  AM-PAC 6 Clicks Daily Activity (OT)  -JJ  AM-PAC 6 Clicks Daily Activity (OT)  -JJ       User Key  (r) = Recorded By, (t) = Taken By, (c) = Cosigned By    Initials Name Provider Type    Ellen Bunch, ARISTEOR/L Occupational Therapist         Time Calculation:   PT Charges     Row Name 06/21/19 1109             Time Calculation    Start Time  1109  -LG       Stop Time  1202  -      Time Calculation (min)  53 min  -LG      PT Received On  06/21/19  -LG      PT Goal Re-Cert Due Date  06/28/19  -         Time Calculation- PT    Total Timed Code Minutes- PT  53 minute(s)  -LG        User Key  (r) = Recorded By, (t) = Taken By, (c) = Cosigned By    Initials Name Provider Type     Augustus Paulino, DEYANIRA Physical Therapy Assistant        Therapy Charges for Today     Code Description Service Date Service Provider Modifiers Qty    16518932561 HC PT THERAPEUTIC ACT EA 15 MIN 6/20/2019 Augustus Paulino, PTA GP 2    52303143759 HC PT THER PROC EA 15 MIN 6/20/2019 Augustus Paulino, PTA GP 1    63189169715 HC GAIT TRAINING EA 15 MIN 6/21/2019 Augustus Paulino, PTA GP 1    41807362340 HC PT THERAPEUTIC ACT EA 15 MIN 6/21/2019 Augustus Paulino, PTA GP 2    14213761210 HC PT THER PROC EA 15 MIN 6/21/2019 Augustus Paulino, PTA GP 1          PT G-Codes  Outcome Measure Options: AM-PAC 6 Clicks Daily Activity (OT)  AM-PAC 6 Clicks Score: 8  Score: 12    Augustus Paulino PTA  6/21/2019

## 2019-06-21 NOTE — PLAN OF CARE
Problem: Patient Care Overview  Goal: Plan of Care Review  Outcome: Ongoing (interventions implemented as appropriate)   06/21/19 0695   Coping/Psychosocial   Plan of Care Reviewed With patient;daughter   Plan of Care Review   Progress improving   OTHER   Outcome Summary Patient up to chair X 2 assist with walker and gait belt. Safety maintained, fall precautions in place. A&O X 4, bilateral lower extremity weakness noted. Medicated once for pain. Repostion q 2 hours, skin intact. Discharge to [place] Monday.     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      Problem: Nutrition, Imbalanced: Inadequate Oral Intake (Adult)  Goal: Identify Related Risk Factors and Signs and Symptoms  Outcome: Ongoing (interventions implemented as appropriate)    Goal: Improved Oral Intake  Outcome: Ongoing (interventions implemented as appropriate)    Goal: Prevent Further Weight Loss  Outcome: Ongoing (interventions implemented as appropriate)

## 2019-06-21 NOTE — PLAN OF CARE
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      Problem: Nutrition, Imbalanced: Inadequate Oral Intake (Adult)  Goal: Identify Related Risk Factors and Signs and Symptoms  Outcome: Ongoing (interventions implemented as appropriate)    Goal: Improved Oral Intake  Outcome: Ongoing (interventions implemented as appropriate)    Goal: Prevent Further Weight Loss  Outcome: Ongoing (interventions implemented as appropriate)

## 2019-06-21 NOTE — DISCHARGE PLACEMENT REQUEST
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 without using bedrails?  --  --  2  -MARYCARMEN (r) SC (t) MARYCARMEN (c)    Moving from lying on back to sitting on the side of a flat bed without bedrails?  --  --  2  -MARYCARMEN (r) SC (t) MARYCARMEN (c)    Moving to and from a bed to a chair (including a wheelchair)?  --  --  1  -MARYCARMEN (r) SC (t) MARYCARMEN (c)    Standing up from a chair using your arms (e.g., wheelchair, bedside chair)?  --  --  1  -MARYCARMEN (r) SC (t) MARYCARMEN (c)    Climbing 3-5 steps with a railing?  --  --  1  -MARYCARMEN (r) SC (t) MARYCARMEN (c)    To walk in hospital room?  --  --  1  -MARYCARMEN (r) SC (t) MARYCARMEN (c)    AM-PAC 6 Clicks Score  --  --  8  -MARYCARMEN (r) SC (t)       How much help from another is currently needed...    Putting on and taking off regular lower body clothing?  1  -JJ  1  -JJ  --    Bathing (including washing, rinsing, and drying)  2  -JJ  2  -JJ  --    Toileting (which includes using toilet bed pan or urinal)  1  -JJ  1  -JJ  --    Putting on and taking off regular upper body clothing  2  -JJ  2  -JJ  --    Taking care of personal grooming (such as brushing teeth)  2  -JJ  2  -JJ  --    Eating meals  3  -JJ  2  -JJ  --    Score   11  -JJ  10  -JJ  --       Functional Assessment    Outcome Measure Options  AM-PAC 6 Clicks Daily Activity (OT)  -JJ  AM-PAC 6 Clicks Daily Activity (OT)  -JJ  AM-PAC 6 Clicks Basic Mobility (PT)  -MARYCARMEN (r) SC (t) MARYCARMEN (c)      User Key  (r) = Recorded By, (t) = Taken By, (c) = Cosigned By    Initials Name Provider Type    Davy Lozoya, PT DPT Physical Therapist    Ellen Bunch, OTR/L Occupational Therapist    John Bundy, PT Student PT Student         Time Calculation:   PT Charges     Row Name 19 08             Time Calculation    Start Time  08  -      Stop Time  916  -      Time Calculation (min)  49 min  -LG      PT Received On  19  -      PT Goal Re-Cert Due Date  19  -         Time Calculation- PT    Total Timed Code Minutes- PT  49 minute(s)  -LG        User Key  (r) = Recorded By, (t) = Taken By, (c) = Cosigned By    Initials Name Provider Type     Augustus Paulino PTA Physical Therapy Assistant        Therapy Charges for Today     Code Description Service Date Service Provider Modifiers Qty    14780654461 HC PT THERAPEUTIC ACT EA 15 MIN 2019 Augustus Paulino PTA GP 2    69902820964 HC PT THER PROC EA 15 MIN 2019 Augustus Paulino PTA GP 1    20399000754 HC PT THERAPEUTIC ACT EA 15 MIN 2019 Augustus Paulino PTA GP 2    95569268960 HC PT THER PROC EA 15 MIN 2019 Augustus Paulino PTA GP 1          PT G-Codes  Outcome Measure Options: AM-PAC 6 Clicks Daily Activity (OT)  AM-PAC 6 Clicks Score: 8  Score: 11    Augustus Paulino PTA  2019         Electronically signed by Augustus Paulino PTA at 2019 10:46 AM     Sara eMredith PTA at 2019  2:37 PM  Version 1 of 1         Inpatient Rehabilitation - Physical Therapy Treatment Note   [facility]     Patient Name: Madi Mancini  : 1940  MRN: [id number]  Today's Date: 2019  Onset of Illness/Injury or Date of Surgery: 06/15/19  Date of Referral to PT: 19  Referring Physician:  Dr. Bhat    Admit Date: 6/15/2019    Visit Dx:    ICD-10-CM ICD-9-CM   1. Bilateral subdural hematomas (CMS/HCC) S06.5X9A 432.1   2. Impaired physical mobility Z74.09 781.99   3. Decreased activities of daily living (ADL) R68.89 780.99     Patient Active Problem List   Diagnosis   • Bilateral subdural hematomas (CMS/HCC)       Therapy Treatment    Rehabilitation Treatment Summary     Row Name 06/20/19 1340 06/20/19 1317 06/20/19 0827       Treatment Time/Intention    Discipline  physical therapy assistant  -KJ  occupational therapist  -JJ  physical therapy assistant  -LG    Document Type  therapy note (daily note)  -KJ2  therapy note (daily note)  -JJ  therapy note (daily note)  -LG    Subjective Information  no complaints  -KJ2  complains of;weakness;fatigue  -JJ  complains of;weakness;fatigue  -LG2    Mode of Treatment  physical therapy  -KJ2  occupational therapy;individual therapy  -JJ  individual therapy;physical therapy  -LG2    Patient/Family Observations  daughter present  -KJ2  no family present in room   -J  daughter bedside  -LG2    Therapy Frequency (OT Eval)  --  5 times/wk  -J  --    Patient Effort  good  -KJ2  good  -JJ  --    Comment  BLE increased weakness  -KJ2  --  --    Existing Precautions/Restrictions  fall  -KJ2  fall  -JJ  fall  -LG2    Recorded by [KJ] Sara Meredith, PTA 06/20/19 1419  [KJ2] Sara Meredith, PTA 06/20/19 1436 [JJ] Ellen Beltran, OTR/L 06/20/19 1401 [LG] Augustus Paulino, PTA 06/20/19 0828  [LG2] Augustus Paulino, PTA 06/20/19 1041    Row Name 06/20/19 0827             Vital Signs    Pre Systolic BP Rehab  139  -LG      Pre Treatment Diastolic BP  90  -LG      Pretreatment Heart Rate (beats/min)  105  -LG      Pre SpO2 (%)  95  -LG      Recorded by [LG] Augustus Paulino, PTA 06/20/19 0828      Row Name 06/20/19 1340 06/20/19 1317 06/20/19 0827       Bed Mobility Assessment/Treatment    Bed Mobility Assessment/Treatment  --  supine-sit;sit-supine;rolling left;rolling  right;scooting/bridging  -JJ  --    Rolling Left [place] (Bed Mobility)  --  moderate assist (50% patient effort);2 person assist  -JJ  --    Rolling Right [place] (Bed Mobility)  --  moderate assist (50% patient effort);2 person assist  -JJ  --    Scooting/Bridging [place] (Bed Mobility)  --  dependent (less than 25% patient effort);2 person assist  -JJ  dependent (less than 25% patient effort);2 person assist  -LG    Supine-Sit [place] (Bed Mobility)  verbal cues;moderate assist (50% patient effort);2 person assist  -KJ  verbal cues;moderate assist (50% patient effort);maximum assist (25% patient effort);2 person assist  -JJ  verbal cues;moderate assist (50% patient effort);maximum assist (25% patient effort);2 person assist  -LG    Sit-Supine [place] (Bed Mobility)  --  moderate assist (50% patient effort);2 person assist  -JJ  moderate assist (50% patient effort);2 person assist  -LG    Bed Mobility, Safety Issues  cognitive deficits limit understanding;decreased use of arms for pushing/pulling;decreased use of legs for bridging/pushing  -KJ  cognitive deficits limit understanding;decreased use of arms for pushing/pulling;decreased use of legs for bridging/pushing  -JJ  cognitive deficits limit understanding;decreased use of arms for pushing/pulling;decreased use of legs for bridging/pushing  -LG    Assistive Device (Bed Mobility)  --  draw sheet  -JJ  draw sheet  -LG    Comment (Bed Mobility)  sat EOB x 10 minutes; worked on BUE/LE AROM and trunk strengthening  -KJ  --  Sat EOB worked on sitting balance, reaching and LE Strengthening exercises  -LG    Recorded by [KJ] Sara Meredith, PTA 06/20/19 1436 [JJ] Ellen Beltran OTR/MARBELLA 06/20/19 1401 [LG] Augustus Paulino, PTA 06/20/19 1041    Row Name 06/20/19 1317             Transfer Assessment/Treatment    Transfer Assessment/Treatment  sit-stand transfer;stand-sit transfer  -JJ      Recorded by [JJ] Ellen Beltran OTR/MARBELLA 06/20/19 1401       Row Name 06/20/19 0827             Bed-Chair Transfer    Bed-Chair [place] (Transfers)  verbal cues;minimum assist (75% patient effort);2 person assist  -LG      Assistive Device (Bed-Chair Transfers)  -- HHA x 2  -LG      Recorded by [LG] Augustus Paulino, PTA 06/20/19 1044      Row Name 06/20/19 0827             Chair-Bed Transfer    Chair-Bed [place] (Transfers)  verbal cues;minimum assist (75% patient effort);stand by assist  -LG      Recorded by [LG] Augustus Paluino, PTA 06/20/19 1044      Row Name 06/20/19 1340 06/20/19 1317 06/20/19 0827       Sit-Stand Transfer    Sit-Stand [place] (Transfers)  moderate assist (50% patient effort);2 person assist  -KJ  minimum assist (75% patient effort);moderate assist (50% patient effort);2 person assist;verbal cues  -JJ  verbal cues;minimum assist (75% patient effort);moderate assist (50% patient effort);2 person assist  -LG    Assistive Device (Sit-Stand Transfers)  walker, front-wheeled  -KJ  --  --    Recorded by [KJ] Sara Meredith, PTA 06/20/19 1436 [JJ] Ellen Beltran OTR/L 06/20/19 1401 [LG] Augustus Paulino, PTA 06/20/19 1044    Row Name 06/20/19 1340 06/20/19 1317 06/20/19 0827       Stand-Sit Transfer    Stand-Sit [place] (Transfers)  verbal cues;moderate assist (50% patient effort);2 person assist  -KJ  minimum assist (75% patient effort);moderate assist (50% patient effort);2 person assist;verbal cues  -JJ  verbal cues;minimum assist (75% patient effort);2 person assist  -LG    Assistive Device (Stand-Sit Transfers)  walker, front-wheeled  -KJ  --  --    Recorded by [KJ] Sara Meredith, PTA 06/20/19 1436 [JJ] Ellen Beltran OTR/L 06/20/19 1401 [LG] Augustus Paulino, PTA 06/20/19 1044    Row Name 06/20/19 1340             Stand Pivot/Stand Step Transfer    Stand Pivot/Stand Step [place]  moderate assist (50% patient effort);2 person assist;verbal cues  -KJ      Assistive Device (Stand Pivot Stand Step Transfer)  walker,  front-wheeled  -KJ      Recorded by [KJ] Sara Meredith, PTA 06/20/19 1436      Row Name 06/20/19 0827             Wheelchair Transfer    Type (Wheelchair Transfer)  sit-stand;stand-sit;stand pivot/stand step  -LG      [place] (Wheelchair Transfer)  verbal cues;minimum assist (75% patient effort);2 person assist  -LG      Recorded by [LG] Augustus Paulino, PTA 06/20/19 1044      Row Name 06/20/19 1340             Gait/Stairs Assessment/Training    Comment (Gait/Stairs)  stood x 3, pivot to chair; flexed knees decreased use of walker  -KJ      Recorded by [KJ] Sara Meredith, PTA 06/20/19 1436      Row Name 06/20/19 1317             ADL Assessment/Intervention    69001 - OT Self Care/Mgmt Minutes  19  -JJ      Recorded by [JJ] Ellen Beltran OTR/L 06/20/19 1419      Row Name 06/20/19 1317             BADL Safety/Performance    Cognitive Impairments, BADL Safety/Performance  awareness, need for assistance;insight into deficits/self awareness;safety precaution awareness;problem solving/reasoning;judgment;safety precaution follow-through  -JJ      Skilled BADL Treatment/Intervention  BADL process/adaptation training  -JJ      Recorded by [JJ] Ellen Beltran OTR/L 06/20/19 1419      Row Name 06/20/19 1317             Therapeutic Exercise    89756 - OT Therapeutic Exercise Minutes  20  -JJ      Recorded by [JJ] Ellen Beltran OTR/L 06/20/19 1419      Row Name 06/20/19 1340 06/20/19 1317 06/20/19 0827       Therapeutic Exercise    Exercise Type (Therapeutic Exercise)  AROM (active range of motion)  -KJ  AROM (active range of motion);AAROM (active assistive range of motion) LUE AROM; RUE AAROM  -JJ  --    Position (Therapeutic Exercise)  seated  -KJ  seated  -JJ  --    Sets/Reps (Therapeutic Exercise)  15  -KJ  6  -JJ  --    Comment (Therapeutic Exercise)  --  --  B LE AAROM x 15 reps  -LG    Recorded by [KJ] Sara Meredith, PTA 06/20/19 1436 [JJ] Ellen Beltran OTR/MARBELLA 06/20/19 1419 [LG] Lora,  Augustus MACIAS, PTA 06/20/19 1044    Row Name 06/20/19 1317             Balance    Balance  static sitting balance;static standing balance  -JJ      Recorded by [JJ] Ellen Beltran OTR/L 06/20/19 1419      Row Name 06/20/19 1317             Static Sitting Balance    Level of [place] (Supported Sitting, Static Balance)  contact guard assist;moderate assist, 50 to 74% patient effort;2 person assist  -JJ      Sitting Position (Supported Sitting, Static Balance)  sitting on edge of bed  -JJ      Time Able to Maintain Position (Supported Sitting, Static Balance)  more than 5 minutes  -JJ      Comment (Supported Sitting, Static Balance)  flucuating assist   -JJ      Recorded by [JJ] Ellen Beltran OTR/L 06/20/19 1419      Row Name 06/20/19 1317             Static Standing Balance    Level of [place] (Supported Standing, Static Balance)  minimal assist, 75% patient effort;moderate assist, 50 to 74% patient effort;2 person assist  -JJ      Time Able to Maintain Position (Supported Standing, Static Balance)  15 to 30 seconds  -JJ      Assistive Device Utilized (Supported Standing, Static Balance)  other (see comments) HHAx2  -JJ      Recorded by [JJ] Ellen Beltran OTR/L 06/20/19 1419      Row Name 06/20/19 1340 06/20/19 1317 06/20/19 0827       Positioning and Restraints    Pre-Treatment Position  in bed  -KJ  in bed  -JJ  in bed  -LG    Post Treatment Position  chair  -KJ  bed  -JJ  bed  -LG    In Bed  encouraged to call for assist;with family/caregiver  -KJ  side lying right;call light within reach;encouraged to call for assist;exit alarm on;notified nsg;with family/caregiver;side rails up x2;SCD pump applied  -JJ  fowlers;call light within reach;encouraged to call for assist;with family/caregiver;with nsg  -LG    Recorded by [KJ] Sara Meredith, PTA 06/20/19 1436 [JJ] Ellen Beltran OTR/MARBELLA 06/20/19 1419 [LG] Augustus Paulino, PTA 06/20/19 1044    Row Name 06/20/19 1317             Pain Assessment     Additional Documentation  Pain Scale: Numbers Pre/Post-Treatment (Group)  -JJ      Recorded by [JJ] Ellen Beltran OTR/L 06/20/19 1419      Row Name 06/20/19 1340 06/20/19 1317          Pain Scale: Numbers Pre/Post-Treatment    Pain Scale: Numbers, Pretreatment  3/10  -KJ  0/10 - no pain  -JJ     Pain Scale: Numbers, Post-Treatment  4/10  -KJ  0/10 - no pain  -JJ     Pain Location - Side  -- generalized  -KJ  --     Recorded by [KJ] Sara Meredith, PTA 06/20/19 1436 [JJ] Ellen Beltran OTR/L 06/20/19 1419     Row Name 06/20/19 1317             Sensory Assessment/Intervention    Sensory General Assessment  no sensation deficits identified  -JJ      Recorded by [JJ] Ellen Beltran OTR/L 06/20/19 1419      Row Name                Wound 06/16/19 0957 Other (See comments) head incision    Wound - Properties Group Date first assessed: 06/16/19 [LC] Time first assessed: 0957 [LC] Side: Other (See comments) [LC] Location: head [LC] Type: incision [LC] Recorded by:  [LC] Keyla Fletcher RN 06/16/19 0957    Row Name 06/20/19 1317             Coping    Verbalized Emotional State  acceptance  -JJ      Recorded by [JJ] Ellen Beltran OTR/L 06/20/19 1419      Row Name 06/20/19 1317             Plan of Care Review    Plan of Care Reviewed With  patient  -JJ      Recorded by [JPABLO] Ellen Beltran OTR/L 06/20/19 1419      Row Name 06/20/19 1317             Outcome Summary/Treatment Plan (OT)    Daily Summary of Progress (OT)  progress toward functional goals is good  -JJ      Anticipated Discharge Disposition (OT)  skilled nursing facility;inpatient rehabilitation facility  -JJ      Recorded by [JPABLO] Ellen Beltran OTR/L 06/20/19 1419        User Key  (r) = Recorded By, (t) = Taken By, (c) = Cosigned By    Initials Name Effective Dates Discipline    LG Augustus Paulino, PTA 08/02/16 -  PT    Sara Segura, PTA 08/02/16 -  PT    Keyla Hopkins RN 08/02/16 -  Nurse    Ellen Bunch, OTR/L  10/12/18 -  OT          Wound 06/16/19 0957 Other (See comments) head incision (Active)   Dressing Appearance dry;intact;no drainage 6/20/2019  9:00 AM   Closure KARLEE 6/20/2019  9:00 AM   Base dressing in place, unable to visualize 6/20/2019  9:00 AM   Periwound intact;dry 6/20/2019  4:04 AM   Drainage Amount none 6/20/2019  4:04 AM   Care, Wound antimicrobial agent applied 6/19/2019  8:14 PM   Dressing Care, Wound gauze 6/20/2019  8:00 AM           Physical Therapy Education     Title: PT OT SLP Therapies (In Progress)     Topic: Physical Therapy (In Progress)     Point: Mobility training (Done)     Learning Progress Summary           Patient Acceptance, E, VU by SC at 6/18/2019  9:45 AM    Comment:  SPT/OT reviewed safety concerns and mobility deficits with pt and daughter while mobilizing pt in bed                   Point: Body mechanics (Done)     Learning Progress Summary           Patient Acceptance, E, VU by SC at 6/18/2019  9:45 AM    Comment:  SPT/OT reviewed safety concerns and mobility deficits with pt and daughter while mobilizing pt in bed                   Point: Precautions (Done)     Learning Progress Summary           Patient Acceptance, E, VU by SC at 6/18/2019  9:45 AM    Comment:  SPT/OT reviewed safety concerns and mobility deficits with pt and daughter while mobilizing pt in bed                               User Key     Initials Effective Dates Name Provider Type Discipline    SC 05/15/19 -  John Peace, PT Student PT Student PT                PT Recommendation and Plan        Outcome Measures     Row Name 06/20/19 1400 06/19/19 1400 06/18/19 1100       How much help from another is currently needed...    Putting on and taking off regular lower body clothing?  1  -JJ  1  -JJ  1  -JJ    Bathing (including washing, rinsing, and drying)  2  -JJ  2  -JJ  2  -JJ    Toileting (which includes using toilet bed pan or urinal)  2  -JJ  1  -JJ  1  -JJ    Putting on and taking off regular upper  body clothing  2  -JJ  2  -JJ  2  -JJ    Taking care of personal grooming (such as brushing teeth)  2  -JJ  2  -JJ  2  -JJ    Eating meals  3  -JJ  3  -JJ  2  -JJ    Score  12  -JJ  11  -JJ  10  -JJ       Functional Assessment    Outcome Measure Options  AM-PAC 6 Clicks Daily Activity (OT)  -JJ  AM-PAC 6 Clicks Daily Activity (OT)  -JJ  AM-PAC 6 Clicks Daily Activity (OT)  -JJ    Row Name 06/18/19 0815             How much help from another person do you currently need...    Turning from your back to your side while in flat bed without using bedrails?  2  -MARYCARMEN (r) SC (t) MARYCARMEN (c)      Moving from lying on back to sitting on the side of a flat bed without bedrails?  2  -MARYCARMEN (r) SC (t) MARYCARMEN (c)      Moving to and from a bed to a chair (including a wheelchair)?  1  -MARYCARMEN (r) SC (t) MARYCARMEN (c)      Standing up from a chair using your arms (e.g., wheelchair, bedside chair)?  1  -MARYCARMEN (r) SC (t) MARYCARMEN (c)      Climbing 3-5 steps with a railing?  1  -MARYCARMEN (r) SC (t) MARYCARMEN (c)      To walk in hospital room?  1  -MARYCARMEN (r) SC (t) MARYCARMEN (c)      AM-PAC 6 Clicks Score  8  -MARYCARMEN (r) SC (t)         Functional Assessment    Outcome Measure Options  AM-PAC 6 Clicks Basic Mobility (PT)  -MARYCARMEN (r) SC (t) MARYCARMEN (c)        User Key  (r) = Recorded By, (t) = Taken By, (c) = Cosigned By    Initials Name Provider Type    Davy Lozoya, PT DPT Physical Therapist    Ellen Bunch, OTR/L Occupational Therapist    SC John Peace, PT Student PT Student         Time Calculation:   PT Charges     Row Name 06/20/19 1436 06/20/19 0827          Time Calculation    Start Time  1340  -KJ  0827  -LG     Stop Time  1411  -KJ  0916  -LG     Time Calculation (min)  31 min  -KJ  49 min  -LG     PT Received On  06/20/19  -KJ  06/20/19  -LG     PT Goal Re-Cert Due Date  06/28/19  -KJ  06/28/19  -LG        Time Calculation- PT    Total Timed Code Minutes- PT  31 minute(s)  -KJ  49 minute(s)  -LG       User Key  (r) = Recorded By, (t) = Taken By, (c) = Cosigned By     Initials Name Provider Type    LG PaulinoAugustus PTA Physical Therapy Assistant    Sara Segura PTA Physical Therapy Assistant        Therapy Charges for Today     Code Description Service Date Service Provider Modifiers Qty    06736672299 HC PT THERAPEUTIC ACT EA 15 MIN 2019 Sara Meredith PTA GP 1    28377541977 HC PT THER PROC EA 15 MIN 2019 Sara Meredith PTA GP 1          PT G-Codes  Outcome Measure Options: AM-PAC 6 Clicks Daily Activity (OT)  AM-PAC 6 Clicks Score: 8  Score: 12    Sara Meredith PTA  2019         Electronically signed by Sara Meredith PTA at 2019  2:38 PM          Occupational Therapy Notes (last 24 hours) (Notes from 2019 10:07 AM through 2019 10:07 AM)      Ellen Beltran OTR/L at 2019  2:24 PM          Acute Care - Occupational Therapy Treatment Note  [facility]     Patient Name: Madi Mancini  : 1940  MRN: [id number]  Today's Date: 2019  Onset of Illness/Injury or Date of Surgery: 06/15/19  Date of Referral to OT: 19  Referring Physician: Dr. Bhat    Admit Date: 6/15/2019       ICD-10-CM ICD-9-CM   1. Bilateral subdural hematomas (CMS/HCC) S06.5X9A 432.1   2. Impaired physical mobility Z74.09 781.99   3. Decreased activities of daily living (ADL) R68.89 780.99     Patient Active Problem List   Diagnosis   • Bilateral subdural hematomas (CMS/HCC)     Past Medical History:   Diagnosis Date   • Elevated cholesterol    • Hypertension      Past Surgical History:   Procedure Laterality Date   • APPENDECTOMY     • CRANIOTOMY FOR SUBDURAL HEMATOMA Bilateral 2019    Procedure: CRANIOTOMY FOR SUBDURAL HEMATOMA;  Surgeon: Galen Bhat MD;  Location: [facility];  Service: Neurosurgery   • UMBILICAL HERNIA REPAIR         Therapy Treatment    Rehabilitation Treatment Summary     Row Name 19 1340 19 1317 19 0827       Treatment Time/Intention    Discipline  physical therapy assistant  -RAFAEL  occupational  therapist  -  physical therapy assistant  -    Document Type  --  therapy note (daily note)  -  therapy note (daily note)  -    Subjective Information  --  complains of;weakness;fatigue  -  complains of;weakness;fatigue  -LG2    Mode of Treatment  --  occupational therapy;individual therapy  -  individual therapy;physical therapy  -LG2    Patient/Family Observations  --  no family present in room   -  daughter bedside  -LG2    Therapy Frequency (OT Eval)  --  5 times/wk  -  --    Patient Effort  --  good  -  --    Existing Precautions/Restrictions  --  fall  -J  fall  -LG2    Recorded by [KJ] Sara Meredith, PTA 06/20/19 1419 [JJ] Ellen Beltran OTR/MARBELLA 06/20/19 1401 [LG] Augustus Paulino, PTA 06/20/19 0828  [LG2] Augustus Paulino, PTA 06/20/19 1041    Row Name 06/20/19 0827             Vital Signs    Pre Systolic BP Rehab  139  -LG      Pre Treatment Diastolic BP  90  -LG      Pretreatment Heart Rate (beats/min)  105  -LG      Pre SpO2 (%)  95  -LG      Recorded by [LG] Augustus Paulino, PTA 06/20/19 0828      Row Name 06/20/19 1317 06/20/19 0827          Bed Mobility Assessment/Treatment    Bed Mobility Assessment/Treatment  supine-sit;sit-supine;rolling left;rolling right;scooting/bridging  -J  --     Rolling Left [place] (Bed Mobility)  moderate assist (50% patient effort);2 person assist  -JJ  --     Rolling Right [place] (Bed Mobility)  moderate assist (50% patient effort);2 person assist  -JJ  --     Scooting/Bridging [place] (Bed Mobility)  dependent (less than 25% patient effort);2 person assist  -JJ  dependent (less than 25% patient effort);2 person assist  -LG     Supine-Sit [place] (Bed Mobility)  verbal cues;moderate assist (50% patient effort);maximum assist (25% patient effort);2 person assist  -JJ  verbal cues;moderate assist (50% patient effort);maximum assist (25% patient effort);2 person assist  -LG     Sit-Supine [place] (Bed Mobility)  moderate assist (50%  patient effort);2 person assist  -JJ  moderate assist (50% patient effort);2 person assist  -LG     Bed Mobility, Safety Issues  cognitive deficits limit understanding;decreased use of arms for pushing/pulling;decreased use of legs for bridging/pushing  -JJ  cognitive deficits limit understanding;decreased use of arms for pushing/pulling;decreased use of legs for bridging/pushing  -LG     Assistive Device (Bed Mobility)  draw sheet  -JJ  draw sheet  -LG     Comment (Bed Mobility)  --  Sat EOB worked on sitting balance, reaching and LE Strengthening exercises  -LG     Recorded by [JJ] Ellen Beltran OTR/L 06/20/19 1401 [LG] Augustus Paulino, PTA 06/20/19 1041     Row Name 06/20/19 1317             Transfer Assessment/Treatment    Transfer Assessment/Treatment  sit-stand transfer;stand-sit transfer  -JJ      Recorded by [JJ] Ellen Beltran OTR/L 06/20/19 1401      Row Name 06/20/19 0827             Bed-Chair Transfer    Bed-Chair [place] (Transfers)  verbal cues;minimum assist (75% patient effort);2 person assist  -LG      Assistive Device (Bed-Chair Transfers)  -- HHA x 2  -LG      Recorded by [LG] Augustus Paulino, PTA 06/20/19 1044      Row Name 06/20/19 0827             Chair-Bed Transfer    Chair-Bed [place] (Transfers)  verbal cues;minimum assist (75% patient effort);stand by assist  -LG      Recorded by [LG] Augustus Paulino, PTA 06/20/19 1044      Row Name 06/20/19 1317 06/20/19 0827          Sit-Stand Transfer    Sit-Stand [place] (Transfers)  minimum assist (75% patient effort);moderate assist (50% patient effort);2 person assist;verbal cues  -JJ  verbal cues;minimum assist (75% patient effort);moderate assist (50% patient effort);2 person assist  -LG     Recorded by [JJ] Ellen Beltran OTR/L 06/20/19 1401 [LG] Augustus Paulino, PTA 06/20/19 1044     Row Name 06/20/19 1317 06/20/19 0827          Stand-Sit Transfer    Stand-Sit [place] (Transfers)  minimum assist (75% patient  effort);moderate assist (50% patient effort);2 person assist;verbal cues  -JJ  verbal cues;minimum assist (75% patient effort);2 person assist  -LG     Recorded by [JJ] Ellen Beltran OTR/L 06/20/19 1401 [LG] Augustus Paulino, PTA 06/20/19 1044     Row Name 06/20/19 0827             Wheelchair Transfer    Type (Wheelchair Transfer)  sit-stand;stand-sit;stand pivot/stand step  -LG      [place] Level (Wheelchair Transfer)  verbal cues;minimum assist (75% patient effort);2 person assist  -LG      Recorded by [LG] Augustus Paulino, PTA 06/20/19 1044      Row Name 06/20/19 1317             ADL Assessment/Intervention    47878 - OT Self Care/Mgmt Minutes  19  -JJ      Recorded by [JJ] Ellen Beltran OTR/L 06/20/19 1419      Row Name 06/20/19 1317             BADL Safety/Performance    Cognitive Impairments, BADL Safety/Performance  awareness, need for assistance;insight into deficits/self awareness;safety precaution awareness;problem solving/reasoning;judgment;safety precaution follow-through  -JJ      Skilled BADL Treatment/Intervention  BADL process/adaptation training  -JJ      Recorded by [JJ] Ellen Beltran OTR/L 06/20/19 1419      Row Name 06/20/19 1317             Therapeutic Exercise    45896 - OT Therapeutic Exercise Minutes  20  -JJ      Recorded by [JJ] Ellen Beltran OTR/L 06/20/19 1419      Row Name 06/20/19 1317 06/20/19 0827          Therapeutic Exercise    Exercise Type (Therapeutic Exercise)  AROM (active range of motion);AAROM (active assistive range of motion) LUE AROM; RUE AAROM  -JJ  --     Position (Therapeutic Exercise)  seated  -JJ  --     Sets/Reps (Therapeutic Exercise)  6  -JJ  --     Comment (Therapeutic Exercise)  --  B LE AAROM x 15 reps  -LG     Recorded by [JJ] Ellen Beltran OTR/MARBELLA 06/20/19 1419 [LG] Augustus Paulino, PTA 06/20/19 1044     Row Name 06/20/19 1317             Balance    Balance  static sitting balance;static standing balance  -JJ      Recorded by [CRISTIAN]  Ellen Beltran OTR/L 06/20/19 1419      Row Name 06/20/19 1317             Static Sitting Balance    Level of [place] (Supported Sitting, Static Balance)  contact guard assist;moderate assist, 50 to 74% patient effort;2 person assist  -JJ      Sitting Position (Supported Sitting, Static Balance)  sitting on edge of bed  -JJ      Time Able to Maintain Position (Supported Sitting, Static Balance)  more than 5 minutes  -JJ      Comment (Supported Sitting, Static Balance)  flucuating assist   -JJ      Recorded by [CRISTIAN] Ellen Beltran OTR/L 06/20/19 1419      Row Name 06/20/19 1317             Static Standing Balance    Level of [place] (Supported Standing, Static Balance)  minimal assist, 75% patient effort;moderate assist, 50 to 74% patient effort;2 person assist  -JJ      Time Able to Maintain Position (Supported Standing, Static Balance)  15 to 30 seconds  -JJ      Assistive Device Utilized (Supported Standing, Static Balance)  other (see comments) HHAx2  -JJ      Recorded by [JPABLO] Ellen Beltran OTR/L 06/20/19 1419      Row Name 06/20/19 1317 06/20/19 0827          Positioning and Restraints    Pre-Treatment Position  in bed  -JJ  in bed  -LG     Post Treatment Position  bed  -JJ  bed  -LG     In Bed  side lying right;call light within reach;encouraged to call for assist;exit alarm on;notified nsg;with family/caregiver;side rails up x2;SCD pump applied  -JJ  fowlers;call light within reach;encouraged to call for assist;with family/caregiver;with nsg  -LG     Recorded by [CRISTIAN] Ellen Beltran OTR/MARBELLA 06/20/19 1419 [LG] Augustus Paulino, PTA 06/20/19 1044     Row Name 06/20/19 1317             Pain Assessment    Additional Documentation  Pain Scale: Numbers Pre/Post-Treatment (Group)  -JJ      Recorded by [CRISTIAN] Ellen Beltran OTR/L 06/20/19 1419      Row Name 06/20/19 1317             Pain Scale: Numbers Pre/Post-Treatment    Pain Scale: Numbers, Pretreatment  0/10 - no pain  -JJ      Pain  Scale: Numbers, Post-Treatment  0/10 - no pain  -JJ      Recorded by [JPABLO] Ellen Beltran OTR/L 06/20/19 1419      Row Name 06/20/19 1317             Sensory Assessment/Intervention    Sensory General Assessment  no sensation deficits identified  -JJ      Recorded by [JPABLO] Ellen Beltran OTR/L 06/20/19 1419      Row Name                Wound 06/16/19 0957 Other (See comments) head incision    Wound - Properties Group Date first assessed: 06/16/19 [LC] Time first assessed: 0957 [LC] Side: Other (See comments) [LC] Location: head [LC] Type: incision [LC] Recorded by:  [LC] Keyla Fletcher RN 06/16/19 0957    Row Name 06/20/19 1317             Coping    Verbalized Emotional State  acceptance  -JJ      Recorded by [JPABLO] Ellen Beltran OTR/L 06/20/19 1419      Row Name 06/20/19 1317             Plan of Care Review    Plan of Care Reviewed With  patient  -JJ      Recorded by [CRISTIAN] Ellen Beltran OTR/L 06/20/19 1419      Row Name 06/20/19 1317             Outcome Summary/Treatment Plan (OT)    Daily Summary of Progress (OT)  progress toward functional goals is good  -JJ      Anticipated Discharge Disposition (OT)  skilled nursing facility;inpatient rehabilitation facility  -JJ      Recorded by [CRISTIAN] Ellen Beltran OTR/MARBELLA 06/20/19 1419        User Key  (r) = Recorded By, (t) = Taken By, (c) = Cosigned By    Initials Name Effective Dates Discipline    LG Augustus Paulino, PTA 08/02/16 -  PT    Sara Segura, PTA 08/02/16 -  PT    Keyla Hopkins, RN 08/02/16 -  Nurse    Ellen Bunch OTR/L 10/12/18 -  OT        Wound 06/16/19 0957 Other (See comments) head incision (Active)   Dressing Appearance dry;intact;no drainage 6/20/2019  9:00 AM   Closure KARLEE 6/20/2019  9:00 AM   Base dressing in place, unable to visualize 6/20/2019  9:00 AM   Periwound intact;dry 6/20/2019  4:04 AM   Drainage Amount none 6/20/2019  4:04 AM   Care, Wound antimicrobial agent applied 6/19/2019  8:14 PM   Dressing Care,  Wound gauze 6/20/2019  8:00 AM       Occupational Therapy Education     Title: PT OT SLP Therapies (In Progress)     Topic: Occupational Therapy (In Progress)     Point: ADL training (Done)     Description: Instruct learner(s) on proper safety adaptation and remediation techniques during self care or transfers.   Instruct in proper use of assistive devices.    Learning Progress Summary           Patient Acceptance, E, VU by CRISTIAN at 6/18/2019 11:40 AM    Comment:  OT POC, adls, bed mobility, activity tolerance.   Family Acceptance, E, VU by CRISTIAN at 6/18/2019 11:40 AM    Comment:  OT POC, adls, bed mobility, activity tolerance.                               User Key     Initials Effective Dates Name Provider Type Discipline     10/12/18 -  Ellen Beltran OTR/L Occupational Therapist OT                OT Recommendation and Plan  Outcome Summary/Treatment Plan (OT)  Daily Summary of Progress (OT): progress toward functional goals is good  Anticipated Discharge Disposition (OT): skilled nursing facility, inpatient rehabilitation facility  Planned Therapy Interventions (OT Eval): activity tolerance training, adaptive equipment training, BADL retraining, functional balance retraining, occupation/activity based interventions, patient/caregiver education/training, strengthening exercise, transfer/mobility retraining  Therapy Frequency (OT Eval): 5 times/wk  Daily Summary of Progress (OT): progress toward functional goals is good  Plan of Care Review  Plan of Care Reviewed With: patient  Plan of Care Reviewed With: patient  Outcome Summary: OT tx completed. Pt is much more alert and following commands this date. Able to complete supine <> sit with Mod A x2, rolling left/right Mod Ax2, scooting/brdiging is dependentx2. Pt complete AROM RUE and AAROM LUE 6 reps of shoulder flexion. Min/Mod A x2 for sit <> stand t/f from EOB x2 attempts. Pt would benefit from inpatient rehab upon d/c for continued rehab. Continue OT POC.     Outcome Measures     Row Name 06/20/19 1400 06/19/19 1400 06/18/19 1100       How much help from another is currently needed...    Putting on and taking off regular lower body clothing?  1  -JJ  1  -JJ  1  -JJ    Bathing (including washing, rinsing, and drying)  2  -JJ  2  -JJ  2  -JJ    Toileting (which includes using toilet bed pan or urinal)  2  -JJ  1  -JJ  1  -JJ    Putting on and taking off regular upper body clothing  2  -JJ  2  -JJ  2  -JJ    Taking care of personal grooming (such as brushing teeth)  2  -JJ  2  -JJ  2  -JJ    Eating meals  3  -JJ  3  -JJ  2  -JJ    Score  12  -JJ  11  -JJ  10  -JJ       Functional Assessment    Outcome Measure Options  AM-PAC 6 Clicks Daily Activity (OT)  -JJ  AM-PAC 6 Clicks Daily Activity (OT)  -JJ  AM-PAC 6 Clicks Daily Activity (OT)  -JJ    Row Name 06/18/19 0815             How much help from another person do you currently need...    Turning from your back to your side while in flat bed without using bedrails?  2  -MARYCARMEN (r) SC (t) MARYCARMEN (c)      Moving from lying on back to sitting on the side of a flat bed without bedrails?  2  -MARYCARMEN (r) SC (t) MARYCARMEN (c)      Moving to and from a bed to a chair (including a wheelchair)?  1  -MARYCARMEN (r) SC (t) MARYCARMEN (c)      Standing up from a chair using your arms (e.g., wheelchair, bedside chair)?  1  -MARYCARMEN (r) SC (t) MARYCARMEN (c)      Climbing 3-5 steps with a railing?  1  -MARYCARMEN (r) SC (t) MARYCARMEN (c)      To walk in hospital room?  1  -MARYCARMEN (r) SC (t) MARYCARMEN (c)      AM-PAC 6 Clicks Score  8  -MARYCARMEN (r) SC (t)         Functional Assessment    Outcome Measure Options  AM-PAC 6 Clicks Basic Mobility (PT)  -MARYCARMEN (r) SC (t) MARYCARMEN (c)        User Key  (r) = Recorded By, (t) = Taken By, (c) = Cosigned By    Initials Name Provider Type    Davy Lozoya, PT DPT Physical Therapist    Ellen Bunch, OTR/L Occupational Therapist    SC Lohn, John, PAULA Student PT Student           Time Calculation:   Time Calculation- OT     Row Name 06/20/19 1401 06/20/19 4860           Time Calculation- OT    OT Start Time  1314  -JJ  --     OT Stop Time  1353  -JJ  --     OT Time Calculation (min)  39 min  -JJ  --     Total Timed Code Minutes- OT  39 minute(s)  -J  --     OT Received On  06/20/19  -JJ  --        Timed Charges    52949 - OT Therapeutic Exercise Minutes  --  20 -J     35901 - OT Self Care/Mgmt Minutes  --  19 -JJ       User Key  (r) = Recorded By, (t) = Taken By, (c) = Cosigned By    Initials Name Provider Type    PABLO Ellen Beltran OTR/L Occupational Therapist        Therapy Charges for Today     Code Description Service Date Service Provider Modifiers Qty    02788938245 HC OT SELF CARE/MGMT/TRAIN EA 15 MIN 6/19/2019 Ellen Beltran OTR/L GO 2    68183461813 HC OT THER PROC EA 15 MIN 6/20/2019 Ellen Beltran OTR/L GO 2    94149762409 HC OT SELF CARE/MGMT/TRAIN EA 15 MIN 6/20/2019 Ellen Beltran OTR/L GO 1               ROBERT Ramirez  6/20/2019    Electronically signed by Ellen Beltran OTR/L at 6/20/2019  2:24 PM     Beltran, Ellen, OTR/L at 6/20/2019  2:24 PM          Problem: Patient Care Overview  Goal: Plan of Care Review  Outcome: Ongoing (interventions implemented as appropriate)   06/20/19 1421   Coping/Psychosocial   Plan of Care Reviewed With patient   Plan of Care Review   Progress improving   OTHER   Outcome Summary OT tx completed. Pt is much more alert and following commands this date. Able to complete supine <> sit with Mod A x2, rolling left/right Mod Ax2, scooting/brdiging is dependentx2. Pt complete AROM RUE and AAROM LUE 6 reps of shoulder flexion. Min/Mod A x2 for sit <> stand t/f from EOB x2 attempts. Pt would benefit from inpatient rehab upon d/c for continued rehab. Continue OT POC.            Electronically signed by Ellen Beltran OTR/L at 6/20/2019  2:24 PM

## 2019-06-21 NOTE — PROGRESS NOTES
Continued Stay Note  [facility]     Patient Name: Madi Mancini  MRN: [id number]  Today's Date: 6/21/2019    Admit Date: 6/15/2019    Discharge Plan     Row Name 06/21/19 1047       Plan    Plan Comments  MADE REFERRAL TO [facility] PER REQUEST. MARCO ANTONIO STATES HE WILL REVIEW REFERRAL BUT NOT SURE IF THEY WILL HAVE A MALE BED UNTIL MONDAY. MARCO ANTONIO STATES HE WILL CALL BACK AFTER REFERRAL REVIEWED.     Row Name 06/21/19 5762       Plan    Plan Comments  Daughter/family/patient requesting referral to [facility] in [place]. CM unable to reach SNF. SW notified and will contact admissions and proceed with referral. Patient could be ready for dc today per MD. pending repeat CT scan of head.          Discharge Codes    No documentation.             HALIMA Chaudhary

## 2019-06-21 NOTE — THERAPY TREATMENT NOTE
Acute Care - Occupational Therapy Treatment Note  [facility]     Patient Name: Madi Mancini  : 1940  MRN: [id number]  Today's Date: 2019  Onset of Illness/Injury or Date of Surgery: 06/15/19  Date of Referral to OT: 19  Referring Physician: Dr. Bhat    Admit Date: 6/15/2019       ICD-10-CM ICD-9-CM   1. Bilateral subdural hematomas (CMS/HCC) S06.5X9A 432.1   2. Impaired physical mobility Z74.09 781.99   3. Decreased activities of daily living (ADL) R68.89 780.99     Patient Active Problem List   Diagnosis   • Bilateral subdural hematomas (CMS/HCC)     Past Medical History:   Diagnosis Date   • Elevated cholesterol    • Hypertension      Past Surgical History:   Procedure Laterality Date   • APPENDECTOMY     • CRANIOTOMY FOR SUBDURAL HEMATOMA Bilateral 2019    Procedure: CRANIOTOMY FOR SUBDURAL HEMATOMA;  Surgeon: Galen Bhat MD;  Location: [facility];  Service: Neurosurgery   • UMBILICAL HERNIA REPAIR         Therapy Treatment    Rehabilitation Treatment Summary     Row Name 19 1109 19 1012 19 0952       Treatment Time/Intention    Discipline  physical therapy assistant  -LG  --  -LG  occupational therapy assistant  -TS    Document Type  --  --  -LG  therapy note (daily note)  -TS    Subjective Information  --  --  complains of;weakness;fatigue  -TS2    Patient Effort  --  --  good  -TS2    Existing Precautions/Restrictions  --  --  fall  -TS2    Recorded by [LG] Augustus Paulino, PTA 19 1110 [LG] Augustus Paulino, PTA 19 1059 [TS] Zaria Pack, ARREDONDO/L 19 1002  [TS2] Zaria Pack, ARREDONDO/L 19 1158    Row Name 19 0828             Treatment Time/Intention    Discipline  physical therapy assistant  -LG      Comment  Pt just started eating breakfast. PT will check back  -LG      Recorded by [LG] Augustus Paulino, PTA 19 0828      Row Name 19 0952             Cognitive Assessment/Intervention- PT/OT    Personal Safety  Interventions  fall prevention program maintained;gait belt;nonskid shoes/slippers when out of bed  -TS      Recorded by [TS] Zaria Pack COTA/L 06/21/19 1158      Row Name 06/21/19 0952             Transfer Assessment/Treatment    Transfer Assessment/Treatment  sit-stand transfer;stand-sit transfer;toilet transfer  -TS      Recorded by [TS] Zaria Pack COTA/L 06/21/19 1158      Row Name 06/21/19 0952             Sit-Stand Transfer    Sit-Stand [place] (Transfers)  minimum assist (75% patient effort);moderate assist (50% patient effort);2 person assist  -TS      Assistive Device (Sit-Stand Transfers)  walker, front-wheeled  -TS      Recorded by [TS] Zaria Pack COTA/L 06/21/19 1158      Row Name 06/21/19 0952             Stand-Sit Transfer    Stand-Sit [place] (Transfers)  minimum assist (75% patient effort);2 person assist  -TS      Assistive Device (Stand-Sit Transfers)  walker, front-wheeled  -TS      Recorded by [TS] Zaria Pack ARREDONDO/L 06/21/19 1158      Row Name 06/21/19 0952             Toilet Transfer    Type (Toilet Transfer)  sit-stand;stand-sit  -TS      [place] Level (Toilet Transfer)  moderate assist (50% patient effort)  -TS      Assistive Device (Toilet Transfer)  commode;grab bars/safety frame  -TS      Recorded by [TS] Zaria Pack COTA/L 06/21/19 1158      Row Name 06/21/19 0952             Positioning and Restraints    Pre-Treatment Position  in bed  -TS      Post Treatment Position  chair  -TS      In Chair  sitting;call light within reach;encouraged to call for assist  -TS      Recorded by [TS] Zaria Pack COTA/L 06/21/19 1158      Row Name 06/21/19 0952             Pain Scale: Numbers Pre/Post-Treatment    Pain Scale: Numbers, Pretreatment  0/10 - no pain  -TS      Pain Scale: Numbers, Post-Treatment  0/10 - no pain  -TS      Recorded by [TS] Zaria Pack ARREDONDO/L 06/21/19 1158      Row Name                Wound  06/16/19 0957 Other (See comments) head incision    Wound - Properties Group Date first assessed: 06/16/19 [LC] Time first assessed: 0957 [LC] Side: Other (See comments) [LC] Location: head [LC] Type: incision [LC] Recorded by:  [LC] Keyla Fletcher RN 06/16/19 0957    Row Name 06/21/19 0952             Outcome Summary/Treatment Plan (OT)    Daily Summary of Progress (OT)  progress toward functional goals is good  -TS      Recorded by [TS] Zaria Pack ARREDONDO/L 06/21/19 1158        User Key  (r) = Recorded By, (t) = Taken By, (c) = Cosigned By    Initials Name Effective Dates Discipline     Augustus Paulino, PTA 08/02/16 -  PT    TS Zaria Pack ARREDONDO/L 08/02/16 -  OT    LC Keyla Fletcher RN 08/02/16 -  Nurse        Wound 06/16/19 0957 Other (See comments) head incision (Active)   Dressing Appearance dry;intact;no drainage 6/20/2019  7:50 PM   Closure KARLEE 6/20/2019  7:50 PM   Base dressing in place, unable to visualize 6/20/2019  7:50 PM   Drainage Amount none 6/20/2019  7:50 PM       Occupational Therapy Education     Title: PT OT SLP Therapies (In Progress)     Topic: Occupational Therapy (In Progress)     Point: ADL training (Done)     Description: Instruct learner(s) on proper safety adaptation and remediation techniques during self care or transfers.   Instruct in proper use of assistive devices.    Learning Progress Summary           Patient Acceptance, E, VU by CRISTIAN at 6/18/2019 11:40 AM    Comment:  OT POC, adls, bed mobility, activity tolerance.   Family Acceptance, E, VU by CRISTIAN at 6/18/2019 11:40 AM    Comment:  OT POC, adls, bed mobility, activity tolerance.                               User Key     Initials Effective Dates Name Provider Type Discipline    PABLO 10/12/18 -  Ellen Beltran OTR/L Occupational Therapist OT                OT Recommendation and Plan  Outcome Summary/Treatment Plan (OT)  Daily Summary of Progress (OT): progress toward functional goals is good  Daily Summary of  Progress (OT): progress toward functional goals is good  Outcome Measures     Row Name 06/20/19 1400 06/19/19 1400          How much help from another is currently needed...    Putting on and taking off regular lower body clothing?  1  -JJ  1  -JJ     Bathing (including washing, rinsing, and drying)  2  -JJ  2  -JJ     Toileting (which includes using toilet bed pan or urinal)  2  -JJ  1  -JJ     Putting on and taking off regular upper body clothing  2  -JJ  2  -JJ     Taking care of personal grooming (such as brushing teeth)  2  -JJ  2  -JJ     Eating meals  3  -JJ  3  -JJ     Score  12  -JJ  11  -JJ        Functional Assessment    Outcome Measure Options  AM-PAC 6 Clicks Daily Activity (OT)  -JJ  AM-PAC 6 Clicks Daily Activity (OT)  -JJ       User Key  (r) = Recorded By, (t) = Taken By, (c) = Cosigned By    Initials Name Provider Type    Ellen Herrera OTR/L Occupational Therapist           Time Calculation:   Time Calculation- OT     Row Name 06/21/19 1159             Time Calculation- OT    OT Start Time  0952  -TS      OT Stop Time  1004  -TS      OT Time Calculation (min)  12 min  -TS      Total Timed Code Minutes- OT  12 minute(s)  -TS      OT Received On  06/21/19  -TS         Timed Charges    48607 - OT Self Care/Mgmt Minutes  12  -TS        User Key  (r) = Recorded By, (t) = Taken By, (c) = Cosigned By    Initials Name Provider Type     Zaria Pack COTA/L Occupational Therapy Assistant        Therapy Charges for Today     Code Description Service Date Service Provider Modifiers Qty    [id number]  OT SELF CARE/MGMT/TRAIN EA 15 MIN 6/21/2019 Zaria Pack COTA/L GO 1               Zaria XAVIER. ADAMARIS Pack  6/21/2019

## 2019-06-22 PROCEDURE — 97110 THERAPEUTIC EXERCISES: CPT

## 2019-06-22 PROCEDURE — 93005 ELECTROCARDIOGRAM TRACING: CPT | Performed by: INTERNAL MEDICINE

## 2019-06-22 PROCEDURE — 99024 POSTOP FOLLOW-UP VISIT: CPT | Performed by: NEUROLOGICAL SURGERY

## 2019-06-22 PROCEDURE — 97530 THERAPEUTIC ACTIVITIES: CPT

## 2019-06-22 PROCEDURE — 93010 ELECTROCARDIOGRAM REPORT: CPT | Performed by: INTERNAL MEDICINE

## 2019-06-22 RX ORDER — LISINOPRIL 20 MG/1
20 TABLET ORAL 2 TIMES DAILY
COMMUNITY
End: 2019-06-24 | Stop reason: HOSPADM

## 2019-06-22 RX ORDER — AMLODIPINE BESYLATE 5 MG/1
5 TABLET ORAL 2 TIMES DAILY
COMMUNITY
End: 2019-06-24 | Stop reason: HOSPADM

## 2019-06-22 RX ORDER — TAMOXIFEN CITRATE 10 MG/1
20 TABLET ORAL DAILY
COMMUNITY

## 2019-06-22 RX ORDER — MELOXICAM 15 MG/1
15 TABLET ORAL DAILY
COMMUNITY
End: 2019-06-24 | Stop reason: HOSPADM

## 2019-06-22 RX ORDER — ROSUVASTATIN CALCIUM 20 MG/1
20 TABLET, COATED ORAL NIGHTLY
COMMUNITY

## 2019-06-22 RX ADMIN — METOPROLOL TARTRATE 25 MG: 25 TABLET, FILM COATED ORAL at 20:10

## 2019-06-22 RX ADMIN — HYDROCODONE BITARTRATE AND ACETAMINOPHEN 1 TABLET: 5; 325 TABLET ORAL at 21:45

## 2019-06-22 RX ADMIN — SODIUM CHLORIDE, PRESERVATIVE FREE 3 ML: 5 INJECTION INTRAVENOUS at 08:19

## 2019-06-22 RX ADMIN — DILTIAZEM HYDROCHLORIDE 5 MG/HR: 5 INJECTION INTRAVENOUS at 20:06

## 2019-06-22 RX ADMIN — SODIUM CHLORIDE, PRESERVATIVE FREE 3 ML: 5 INJECTION INTRAVENOUS at 20:07

## 2019-06-22 RX ADMIN — HYDROCODONE BITARTRATE AND ACETAMINOPHEN 1 TABLET: 5; 325 TABLET ORAL at 08:19

## 2019-06-22 NOTE — PROGRESS NOTES
Madi Mancini  79 y.o.      Chief complaint:   Subdural hematoma    Subjective  No headaches this morning.  No nausea no vomiting.  Tolerating all p.o.  Ambulating with physical therapy.    Temp:  [98 °F (36.7 °C)-98.4 °F (36.9 °C)] 98 °F (36.7 °C)  Heart Rate:  [] 102  Resp:  [20-28] 22  BP: ()/(45-73) 110/72      Objective    Neurologic Exam     Mental Status   Oriented to person, place, and time.   Attention: normal.   Speech: speech is normal   Level of consciousness: alert  Knowledge: good.     Cranial Nerves   Cranial nerves II through XII intact.     Motor Exam   Muscle bulk: normal  Overall muscle tone: normal  Right arm pronator drift: absent  Left arm pronator drift: absent    Strength   Strength 5/5 throughout.     Sensory Exam   Light touch normal.   Pinprick normal.     Gait, Coordination, and Reflexes     Coordination   Finger to nose coordination: normal        Bilateral subdural hematomas (CMS/HCC)      Lab Results (last 24 hours)     ** No results found for the last 24 hours. **              Plan:   Bilateral subdural hematoma status post craniotomy.  Doing well.  Working with physical therapy.  Plan rehab next week.  Repeat CT scan tomorrow.    Galen Bhat MD

## 2019-06-22 NOTE — THERAPY TREATMENT NOTE
Acute Care - Physical Therapy Treatment Note  Psychiatric     Patient Name: Madi Mancini  : 1940  MRN: [id number]  Today's Date: 2019  Onset of Illness/Injury or Date of Surgery: 06/15/19  Date of Referral to PT: 19  Referring Physician: Dr. Bhat    Admit Date: 6/15/2019    Visit Dx:    ICD-10-CM ICD-9-CM   1. Bilateral subdural hematomas (CMS/HCC) S06.5X9A 432.1   2. Impaired physical mobility Z74.09 781.99   3. Decreased activities of daily living (ADL) R68.89 780.99     Patient Active Problem List   Diagnosis   • Bilateral subdural hematomas (CMS/HCC)       Therapy Treatment    Rehabilitation Treatment Summary     Row Name 19 0753             Treatment Time/Intention    Discipline  physical therapy assistant  -KJ      Document Type  therapy note (daily note)  -KJ2      Subjective Information  complains of;pain  -KJ2      Mode of Treatment  physical therapy  -KJ2      Patient Effort  good  -KJ2      Comment  crani  -KJ2      Existing Precautions/Restrictions  fall  -KJ2      Recorded by [KJ] Sara Meredith, [facility] 19 0804  [KJ2] Sara Meredith, [facility] 19 0837      Row Name 19 0753             Bed Mobility Assessment/Treatment    Supine-Sit [place] (Bed Mobility)  verbal cues;minimum assist (75% patient effort)  -KJ      Bed Mobility, Safety Issues  cognitive deficits limit understanding  -KJ      Comment (Bed Mobility)  sat pat in chair with chair alarm  -KJ      Recorded by [KJ] Sara Meredith, [facility] 19 0837      Row Name 19 0753             Sit-Stand Transfer    Sit-Stand [place] (Transfers)  verbal cues;minimum assist (75% patient effort);2 person assist  -KJ      Assistive Device (Sit-Stand Transfers)  walker, front-wheeled  -KJ      Recorded by [KJ] Sara Meredith [facility] 19 0837      Row Name 19 0753             Stand-Sit Transfer    Stand-Sit [place] (Transfers)  verbal cues;minimum assist (75% patient effort)  -KJ      Assistive  Device (Stand-Sit Transfers)  walker, front-wheeled  -KJ      Recorded by [KJ] Sara Meredith, PTA 06/22/19 0837      Row Name 06/22/19 0753             Gait/Stairs Assessment/Training    Gait/Stairs Assessment/Training  gait/ambulation independence  -KJ      [place] Level (Gait)  verbal cues;minimum assist (75% patient effort);2 person assist  -KJ      Assistive Device (Gait)  walker, front-wheeled  -KJ      Distance in Feet (Gait)  4-5 steps to chair, stood x 2, then took 3-4 more steps forward  -KJ      Pattern (Gait)  step-to  -KJ      Deviations/Abnormal Patterns (Gait)  bilateral deviations;base of support, narrow;gait speed decreased;stride length decreased  -KJ      Bilateral Gait Deviations  forward flexed posture;heel strike decreased flexed at knees  -KJ      Comment (Gait/Stairs)  constant cueing to stand erect with knees locked and head up.  -KJ      Recorded by [KJ] Sara Meredith, PTA 06/22/19 0837      Row Name 06/22/19 0753             Therapeutic Exercise    Exercise Type (Therapeutic Exercise)  AROM (active range of motion)  -KJ      Position (Therapeutic Exercise)  seated;standing  -KJ      Sets/Reps (Therapeutic Exercise)  15  -KJ      Recorded by [KJ] Sara Meredith, PTA 06/22/19 0837      Row Name 06/22/19 0753             Positioning and Restraints    Pre-Treatment Position  in bed  -KJ      Post Treatment Position  chair  -KJ      In Bed  exit alarm on;encouraged to call for assist  -KJ      Recorded by [KJ] Sara Meredith, PTA 06/22/19 0837      Row Name 06/22/19 0753             Pain Scale: Numbers Pre/Post-Treatment    Pain Scale: Numbers, Pretreatment  6/10  -KJ      Pain Scale: Numbers, Post-Treatment  6/10  -KJ      Pain Location - Orientation  lower  -KJ      Pain Location  back  -KJ      Recorded by [KJ] Sara Meredith, PTA 06/22/19 0837      Row Name                Wound 06/16/19 0957 Other (See comments) head incision    Wound - Properties Group Date first assessed:  06/16/19 [LC] Time first assessed: 0957 [LC] Side: Other (See comments) [LC] Location: head [LC] Type: incision [LC] Recorded by:  [LC] Keyla Fletcher RN 06/16/19 0957      User Key  (r) = Recorded By, (t) = Taken By, (c) = Cosigned By    Initials Name Effective Dates Discipline     Sara Meredith, DEYANIRA 08/02/16 -  PT    LC Keyla Fletcher RN 08/02/16 -  Nurse          Wound 06/16/19 0957 Other (See comments) head incision (Active)   Dressing Appearance open to air 6/21/2019  8:10 PM   Closure Sutures 6/21/2019  8:10 PM   Base dry 6/21/2019  8:10 PM   Periwound intact;dry 6/21/2019  8:10 PM   Drainage Amount none 6/21/2019  8:10 PM   Dressing Care, Wound open to air 6/21/2019  8:10 PM           Physical Therapy Education     Title: PT OT SLP Therapies (In Progress)     Topic: Physical Therapy (In Progress)     Point: Mobility training (Done)     Learning Progress Summary           Patient Acceptance, E, VU by SC at 6/18/2019  9:45 AM    Comment:  SPT/OT reviewed safety concerns and mobility deficits with pt and daughter while mobilizing pt in bed                   Point: Body mechanics (Done)     Learning Progress Summary           Patient Acceptance, E, VU by SC at 6/18/2019  9:45 AM    Comment:  SPT/OT reviewed safety concerns and mobility deficits with pt and daughter while mobilizing pt in bed                   Point: Precautions (Done)     Learning Progress Summary           Patient Acceptance, E, VU by SC at 6/18/2019  9:45 AM    Comment:  SPT/OT reviewed safety concerns and mobility deficits with pt and daughter while mobilizing pt in bed                               User Key     Initials Effective Dates Name Provider Type Discipline    SC 05/15/19 -  John Peace, PT Student PT Student PT                PT Recommendation and Plan     Plan of Care Reviewed With: patient  Progress: improving  Outcome Summary: PT tx completed. Pt supine in bed with no c/o at rest. Maury bed mobility, Maury x 2  sit<>stand. Stood x 4 total [place] x 2. Took 4-5 steps x 2 with sitting rest inbetween. C/O low back pn, rates 6/10. Benefit from inpatient rehab at time of discharge.  Outcome Measures     Row Name 06/20/19 1400 06/19/19 1400          How much help from another is currently needed...    Putting on and taking off regular lower body clothing?  1  -JJ  1  -JJ     Bathing (including washing, rinsing, and drying)  2  -JJ  2  -JJ     Toileting (which includes using toilet bed pan or urinal)  2  -JJ  1  -JJ     Putting on and taking off regular upper body clothing  2  -JJ  2  -JJ     Taking care of personal grooming (such as brushing teeth)  2  -JJ  2  -JJ     Eating meals  3  -JJ  3  -JJ     Score  12  -JJ  11  -JJ        Functional Assessment    Outcome Measure Options  AM-PAC 6 Clicks Daily Activity (OT)  -JJ  AM-PAC 6 Clicks Daily Activity (OT)  -       User Key  (r) = Recorded By, (t) = Taken By, (c) = Cosigned By    Initials Name Provider Type    Ellen Bunch OTR/L Occupational Therapist         Time Calculation:   PT Charges     Row Name 06/22/19 0837             Time Calculation    Start Time  0753  -KJ      Stop Time  0834  -KJ      Time Calculation (min)  41 min  -KJ      PT Received On  06/22/19  -KJ      PT Goal Re-Cert Due Date  06/28/19  -KJ         Time Calculation- PT    Total Timed Code Minutes- PT  41 minute(s)  -KJ        User Key  (r) = Recorded By, (t) = Taken By, (c) = Cosigned By    Initials Name Provider Type    Sara Segura PTA Physical Therapy Assistant        Therapy Charges for Today     Code Description Service Date Service Provider Modifiers Qty    06065868759 HC PT THER PROC EA 15 MIN 6/22/2019 Sara Meredith PTA GP 1    23175661095 HC PT THERAPEUTIC ACT EA 15 MIN 6/22/2019 Sara Meredith PTA GP 2          PT G-Codes  Outcome Measure Options: AM-PAC 6 Clicks Daily Activity (OT)  AM-PAC 6 Clicks Score: 8  Score: 12    Sara Meredith PTA  6/22/2019

## 2019-06-22 NOTE — PLAN OF CARE
Problem: Patient Care Overview  Goal: Plan of Care Review  Outcome: Ongoing (interventions implemented as appropriate)   06/22/19 0854   Coping/Psychosocial   Plan of Care Reviewed With patient   Plan of Care Review   Progress improving   OTHER   Outcome Summary PT tx completed. Pt supine in bed with no c/o at rest. [place] bed mobility, [place] x 2 sit<>stand. Stood x 4 total [place] x 2. Took 4-5 steps x 2 with sitting rest inbetween. C/O low back pn, rates 6/10. Benefit from inpatient rehab at time of discharge.

## 2019-06-22 NOTE — PLAN OF CARE
Problem: Patient Care Overview  Goal: Plan of Care Review  Outcome: Ongoing (interventions implemented as appropriate)   06/22/19 6469   Coping/Psychosocial   Plan of Care Reviewed With patient   Plan of Care Review   Progress improving   OTHER   Outcome Summary Medicated once for c/o R shoulder and neck pain, with prn pain med, relief noted. Alert and oriented x 4. Hr tachy 80's-120's. Respiration beginning of shift 28 breaths/min. Then down to 24 breaths/min. Patient denies any SOB. Reposition every 2 hours. Patient did state, that at times when he turns his head to the left, that sometimes the left side of his face feel numb. Currently no numbness. On cont pulse ox, scd's on. Continue to monitor. Safety maintained. Plan for dc to [place] Monday.      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      Problem: Nutrition, Imbalanced: Inadequate Oral Intake (Adult)  Goal: Identify Related Risk Factors and Signs and Symptoms  Outcome: Ongoing (interventions implemented as appropriate)    Goal: Improved Oral Intake  Outcome: Ongoing (interventions implemented as appropriate)    Goal: Prevent Further Weight Loss  Outcome: Ongoing (interventions implemented as appropriate)

## 2019-06-23 ENCOUNTER — APPOINTMENT (OUTPATIENT)
Dept: CT IMAGING | Facility: HOSPITAL | Age: 79
End: 2019-06-23

## 2019-06-23 PROCEDURE — 99024 POSTOP FOLLOW-UP VISIT: CPT | Performed by: NEUROLOGICAL SURGERY

## 2019-06-23 PROCEDURE — 97110 THERAPEUTIC EXERCISES: CPT

## 2019-06-23 PROCEDURE — 70450 CT HEAD/BRAIN W/O DYE: CPT

## 2019-06-23 PROCEDURE — 97116 GAIT TRAINING THERAPY: CPT

## 2019-06-23 PROCEDURE — 97530 THERAPEUTIC ACTIVITIES: CPT

## 2019-06-23 PROCEDURE — 99223 1ST HOSP IP/OBS HIGH 75: CPT | Performed by: INTERNAL MEDICINE

## 2019-06-23 RX ORDER — DIGOXIN 250 MCG
250 TABLET ORAL EVERY 6 HOURS
Status: COMPLETED | OUTPATIENT
Start: 2019-06-23 | End: 2019-06-23

## 2019-06-23 RX ORDER — DIGOXIN 125 MCG
125 TABLET ORAL
Status: DISCONTINUED | OUTPATIENT
Start: 2019-06-24 | End: 2019-06-24 | Stop reason: HOSPADM

## 2019-06-23 RX ADMIN — HYDROCODONE BITARTRATE AND ACETAMINOPHEN 1 TABLET: 5; 325 TABLET ORAL at 11:18

## 2019-06-23 RX ADMIN — METOPROLOL TARTRATE 25 MG: 25 TABLET, FILM COATED ORAL at 21:50

## 2019-06-23 RX ADMIN — HYDROCODONE BITARTRATE AND ACETAMINOPHEN 1 TABLET: 5; 325 TABLET ORAL at 22:52

## 2019-06-23 RX ADMIN — SODIUM CHLORIDE, PRESERVATIVE FREE 3 ML: 5 INJECTION INTRAVENOUS at 21:50

## 2019-06-23 RX ADMIN — HYDROCODONE BITARTRATE AND ACETAMINOPHEN 1 TABLET: 5; 325 TABLET ORAL at 04:33

## 2019-06-23 RX ADMIN — DIGOXIN 250 MCG: 250 TABLET ORAL at 08:42

## 2019-06-23 RX ADMIN — DIGOXIN 250 MCG: 250 TABLET ORAL at 14:18

## 2019-06-23 RX ADMIN — METOPROLOL TARTRATE 25 MG: 25 TABLET, FILM COATED ORAL at 08:42

## 2019-06-23 RX ADMIN — DIGOXIN 250 MCG: 250 TABLET ORAL at 21:50

## 2019-06-23 RX ADMIN — DILTIAZEM HYDROCHLORIDE 10 MG/HR: 5 INJECTION INTRAVENOUS at 17:40
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
## 2019-06-23 NOTE — PLAN OF CARE
Problem: Patient Care Overview  Goal: Plan of Care Review  Outcome: Ongoing (interventions implemented as appropriate)   06/23/19 1041   Coping/Psychosocial   Plan of Care Reviewed With patient   OTHER   Outcome Summary Pt. requires minimal assist for supine to sit. Minimal assist to stand and ambulate 14' to BR. Poor posture, no heel strike, knees flexed. Worked on sitting and standing posture. Strengthening ex's. Pt. will benefit from safety instruction, gait education and strengthening.

## 2019-06-23 NOTE — PLAN OF CARE
Problem: Patient Care Overview  Goal: Plan of Care Review  Outcome: Ongoing (interventions implemented as appropriate)   06/23/19 1521   Coping/Psychosocial   Plan of Care Reviewed With patient   Plan of Care Review   Progress no change   OTHER   Outcome Summary pt co pain, medication given. CT of head done today. dig started today and drip dc. cont to monitor.      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      Problem: Nutrition, Imbalanced: Inadequate Oral Intake (Adult)  Goal: Identify Related Risk Factors and Signs and Symptoms  Outcome: Ongoing (interventions implemented as appropriate)    Goal: Improved Oral Intake  Outcome: Ongoing (interventions implemented as appropriate)    Goal: Prevent Further Weight Loss  Outcome: Ongoing (interventions implemented as appropriate)
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
## 2019-06-23 NOTE — PROGRESS NOTES
Madi Mancini  79 y.o.      Chief complaint:   Subdural hematoma    Subjective  Afib noted on tele with [facility]. Transferred to 4th floor on [facility]ize, Cardiology following. No HA, Nausea or vomiting    Temp:  [97.9 °F (36.6 °C)-98.7 °F (37.1 °C)] 98.7 °F (37.1 °C)  Heart Rate:  [] 81  Resp:  [18-20] 18  BP: ()/(55-87) 112/87      Objective    Neurologic Exam     Mental Status   Oriented to person, place, and time.   Attention: normal.   Speech: speech is normal   Level of consciousness: alert  Knowledge: good.     Cranial Nerves   Cranial nerves II through XII intact.     Motor Exam   Muscle bulk: normal  Overall muscle tone: normal  Right arm pronator drift: absent  Left arm pronator drift: absent    Strength   Strength 5/5 throughout.     Sensory Exam   Light touch normal.   Pinprick normal.         Bilateral subdural hematomas (CMS/HCC)      Lab Results (last 24 hours)     ** No results found for the last 24 hours. **              Plan:   Bilateral subdural hematoma, afib. CT head this am improved compared to prior    Galen Bhat MD

## 2019-06-24 ENCOUNTER — APPOINTMENT (OUTPATIENT)
Dept: CARDIOLOGY | Facility: HOSPITAL | Age: 79
End: 2019-06-24

## 2019-06-24 VITALS
RESPIRATION RATE: 20 BRPM | HEART RATE: 88 BPM | DIASTOLIC BLOOD PRESSURE: 42 MMHG | BODY MASS INDEX: 35.42 KG/M2 | OXYGEN SATURATION: 95 % | SYSTOLIC BLOOD PRESSURE: 101 MMHG | WEIGHT: 233.69 LBS | HEIGHT: 68 IN | TEMPERATURE: 97.1 F

## 2019-06-24 LAB
BH CV ECHO MEAS - AO MAX PG (FULL): 34.9 MMHG
BH CV ECHO MEAS - AO MAX PG: 38.7 MMHG
BH CV ECHO MEAS - AO MEAN PG (FULL): 19 MMHG
BH CV ECHO MEAS - AO MEAN PG: 21 MMHG
BH CV ECHO MEAS - AO ROOT AREA (BSA CORRECTED): 1.5
BH CV ECHO MEAS - AO ROOT AREA: 8 CM^2
BH CV ECHO MEAS - AO ROOT DIAM: 3.2 CM
BH CV ECHO MEAS - AO V2 MAX: 311 CM/SEC
BH CV ECHO MEAS - AO V2 MEAN: 206 CM/SEC
BH CV ECHO MEAS - AO V2 VTI: 53.9 CM
BH CV ECHO MEAS - AVA(I,A): 1.7 CM^2
BH CV ECHO MEAS - AVA(I,D): 1.7 CM^2
BH CV ECHO MEAS - AVA(V,A): 1.3 CM^2
BH CV ECHO MEAS - AVA(V,D): 1.3 CM^2
BH CV ECHO MEAS - BSA(HAYCOCK): 2.3 M^2
BH CV ECHO MEAS - BSA: 2.2 M^2
BH CV ECHO MEAS - BZI_BMI: 35.4 KILOGRAMS/M^2
BH CV ECHO MEAS - BZI_METRIC_HEIGHT: 172.7 CM
BH CV ECHO MEAS - BZI_METRIC_WEIGHT: 105.7 KG
BH CV ECHO MEAS - EDV(MOD-SP4): 56.8 ML
BH CV ECHO MEAS - EF(MOD-SP4): 66 %
BH CV ECHO MEAS - ESV(MOD-SP4): 19.3 ML
BH CV ECHO MEAS - LA DIMENSION: 3.5 CM
BH CV ECHO MEAS - LA/AO: 1.1
BH CV ECHO MEAS - LAT PEAK E' VEL: 10.3 CM/SEC
BH CV ECHO MEAS - LV DIASTOLIC VOL/BSA (35-75): 26 ML/M^2
BH CV ECHO MEAS - LV MAX PG: 3.8 MMHG
BH CV ECHO MEAS - LV MEAN PG: 2 MMHG
BH CV ECHO MEAS - LV SYSTOLIC VOL/BSA (12-30): 8.8 ML/M^2
BH CV ECHO MEAS - LV V1 MAX: 97.5 CM/SEC
BH CV ECHO MEAS - LV V1 MEAN: 72.7 CM/SEC
BH CV ECHO MEAS - LV V1 VTI: 22.5 CM
BH CV ECHO MEAS - LVLD AP4: 6.4 CM
BH CV ECHO MEAS - LVLS AP4: 5.6 CM
BH CV ECHO MEAS - LVOT AREA (M): 4.2 CM^2
BH CV ECHO MEAS - LVOT AREA: 4.2 CM^2
BH CV ECHO MEAS - LVOT DIAM: 2.3 CM
BH CV ECHO MEAS - MED PEAK E' VEL: 5.98 CM/SEC
BH CV ECHO MEAS - MV DEC TIME: 0.16 SEC
BH CV ECHO MEAS - MV E MAX VEL: 90.2 CM/SEC
BH CV ECHO MEAS - SI(AO): 198.8 ML/M^2
BH CV ECHO MEAS - SI(LVOT): 42.9 ML/M^2
BH CV ECHO MEAS - SI(MOD-SP4): 17.2 ML/M^2
BH CV ECHO MEAS - SV(AO): 433.5 ML
BH CV ECHO MEAS - SV(LVOT): 93.5 ML
BH CV ECHO MEAS - SV(MOD-SP4): 37.5 ML
BH CV ECHO MEASUREMENTS AVERAGE E/E' RATIO: 11.08
LEFT ATRIUM VOLUME INDEX: 23.7 ML/M2
LEFT ATRIUM VOLUME: 51.6 CM3
LV EF 2D ECHO EST: 65 %
MAXIMAL PREDICTED HEART RATE: 140 BPM
STRESS TARGET HR: 119 BPM

## 2019-06-24 PROCEDURE — 99024 POSTOP FOLLOW-UP VISIT: CPT | Performed by: NURSE PRACTITIONER

## 2019-06-24 PROCEDURE — 93306 TTE W/DOPPLER COMPLETE: CPT

## 2019-06-24 PROCEDURE — 97110 THERAPEUTIC EXERCISES: CPT

## 2019-06-24 PROCEDURE — 25010000002 PERFLUTREN 6.52 MG/ML SUSPENSION: Performed by: NEUROLOGICAL SURGERY

## 2019-06-24 PROCEDURE — 93306 TTE W/DOPPLER COMPLETE: CPT | Performed by: INTERNAL MEDICINE

## 2019-06-24 PROCEDURE — 97116 GAIT TRAINING THERAPY: CPT

## 2019-06-24 PROCEDURE — 99233 SBSQ HOSP IP/OBS HIGH 50: CPT | Performed by: INTERNAL MEDICINE

## 2019-06-24 RX ORDER — METOPROLOL TARTRATE 50 MG/1
50 TABLET, FILM COATED ORAL EVERY 12 HOURS SCHEDULED
Status: DISCONTINUED | OUTPATIENT
Start: 2019-06-24 | End: 2019-06-24 | Stop reason: HOSPADM

## 2019-06-24 RX ORDER — ACETAMINOPHEN 325 MG/1
650 TABLET ORAL EVERY 4 HOURS PRN
Qty: 100 TABLET | Refills: 2 | Status: ON HOLD
Start: 2019-06-24 | End: 2019-08-07

## 2019-06-24 RX ORDER — METOPROLOL TARTRATE 50 MG/1
50 TABLET, FILM COATED ORAL EVERY 12 HOURS SCHEDULED
Start: 2019-06-24

## 2019-06-24 RX ORDER — HYDROCODONE BITARTRATE AND ACETAMINOPHEN 5; 325 MG/1; MG/1
1 TABLET ORAL EVERY 8 HOURS PRN
Qty: 90 TABLET | Refills: 0 | Status: SHIPPED | OUTPATIENT
Start: 2019-06-24 | End: 2019-06-28

## 2019-06-24 RX ORDER — DIGOXIN 125 MCG
125 TABLET ORAL
Start: 2019-06-24

## 2019-06-24 RX ADMIN — DILTIAZEM HYDROCHLORIDE 5 MG/HR: 5 INJECTION INTRAVENOUS at 08:15

## 2019-06-24 RX ADMIN — DIGOXIN 125 MCG: 125 TABLET ORAL at 13:13

## 2019-06-24 RX ADMIN — HYDROCODONE BITARTRATE AND ACETAMINOPHEN 1 TABLET: 5; 325 TABLET ORAL at 06:08

## 2019-06-24 RX ADMIN — METOPROLOL TARTRATE 50 MG: 50 TABLET ORAL at 08:15

## 2019-06-24 RX ADMIN — PERFLUTREN 8.48 MG: 6.52 INJECTION, SUSPENSION INTRAVENOUS at 10:34
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
## 2019-06-24 NOTE — PROGRESS NOTES
LOS: 9 days   Patient Care Team:  Provider, No Known as PCP - General    Chief Complaint:   Bilateral subdural hematomas (CMS/HCC)    Shortness of breath    Subjective  Feeling  better  No chest pain   No excessive shortness of breath  No new complaints  Overall feels much improved and rested comfortably    Interval History: Improved overall atrial fibrillation with rates between  bpm.    Telemetry: no malignant arrhythmia. No significant pauses.    Review of Systems     Constitutional: No chills   Has fatigue   No fever.     HENT: Negative.    Eyes: Negative.      Respiratory: Negative for cough,   No chest wall soreness,   Shortness of breath,   no wheezing, no stridor.      Cardiovascular: Negative for chest pain,   No palpitations   No significant  leg swelling.     Gastrointestinal: Negative for abdominal distention,  No abdominal pain,   No blood in stool,   No constipation,   No diarrhea,   No nausea   No vomiting.     Endocrine: Negative.    Genitourinary: Negative for difficulty urinating, dysuria, flank pain and hematuria.     Musculoskeletal: Negative.    Skin: Negative for rash and wound.   Allergic/Immunologic: Negative.      Neurological: Negative for dizziness, syncope, weakness,   No light-headedness  No  headaches.     Hematological: Does not bruise/bleed easily.     Psychiatric/Behavioral: Negative for agitation or behavioral problems,   No confusion,   the patient is  nervous/anxious.       History:   Past Medical History:   Diagnosis Date   • Elevated cholesterol    • Hypertension      Past Surgical History:   Procedure Laterality Date   • APPENDECTOMY     • CRANIOTOMY FOR SUBDURAL HEMATOMA Bilateral 6/16/2019    Procedure: CRANIOTOMY FOR SUBDURAL HEMATOMA;  Surgeon: Galen Bhat MD;  Location: [facility];  Service: Neurosurgery   • UMBILICAL HERNIA REPAIR       Social History     Socioeconomic History   • Marital status:      Spouse name: Not on file   • Number of children:  Not on file   • Years of education: Not on file   • Highest education level: Not on file   Tobacco Use   • Smoking status: Former Smoker   • Smokeless tobacco: Former User     Types: Chew     Quit date: 2009   • Tobacco comment: quit at age 20   Substance and Sexual Activity   • Alcohol use: No     Frequency: Never   • Drug use: No   • Sexual activity: Defer     Partners: Female     Family History   Problem Relation Age of Onset   • Cancer Mother    • Cancer Father    • No Known Problems Sister    • No Known Problems Brother    • COPD Brother    • Cancer Brother        Labs:  WBC No results found for: WBC   HGB No results found for: HGB   HCT No results found for: HCT   Platelets No results found for: PLT   MCV No results found for: MCV         Invalid input(s): LABALBU, PROTNo results found for: CKTOTAL, CKMB, CKMBINDEX, TROPONINI, TROPONINT  PT/INR:  No results found for: PROTIME/No results found for: INR    Imaging Results (last 72 hours)     Procedure Component Value Units Date/Time    CT Head Without Contrast [[id number]] Collected:  06/23/19 0843     Updated:  06/23/19 0851    Narrative:       EXAMINATION: CT head without contrast 06/23/2019     DOSE: 792 mGycm. Automated exposure control was utilized to diminish  patient radiation dose..     HISTORY: Follow-up brain surgery 1 week ago     FINDINGS: Today's exam is compared to previous study of 06/20/2019.  Multiple contiguous axial images are obtained from the skull base to the  vertex per protocol without intravenous contrast-enhancement with  reformatted images obtained in the sagittal and coronal projections from  the original data set.     The patient's undergone previous bilateral frontal craniotomy with  postoperative pneumocephalus present. Predominantly chronic hypodense  subdural hematomas are noted over the frontal and parietal convexity  bilaterally. These are essentially unchanged in size from the previous  study with a maximum thickness on the  right in the frontal region of 14  mm on today's exam and on the left 12 mm. There is a more acute  hemorrhagic component at the level of the left frontal craniotomy site  measuring 10 mm in thickness unchanged from the previous study. There is  mild sulcal effacement associated with the bilateral subdural  collections but without evidence of shift of midline. No evidence of  intra-axial hemorrhage.       Impression:       1.. There has been some diminishment in the postoperative pneumocephalus  when compared to previous study. Otherwise stable appearance of the  brain from previous study of 3 days earlier.  This report was finalized on 06/23/2019 08:48 by Dr. Chace Leon MD.          Objective     Allergies   Allergen Reactions   • Penicillins Other (See Comments)     Causes mouth to be sore       Medication Review: Performed  Current Facility-Administered Medications   Medication Dose Route Frequency Provider Last Rate Last Dose   • digoxin (LANOXIN) tablet 125 mcg  125 mcg Oral Daily Jasen Holcomb MD       • diltiaZEM (CARDIZEM) 125 mg in sodium chloride 0.9 % 125 mL (1 mg/mL) infusion  5-15 mg/hr Intravenous Titrated Jasen Holcomb MD 10 mL/hr at 06/23/19 1740 10 mg/hr at 06/23/19 1740   • hydrALAZINE (APRESOLINE) injection 10 mg  10 mg Intravenous Q4H PRN Marco A Maya APRN       • HYDROcodone-acetaminophen (NORCO) 5-325 MG per tablet 1 tablet  1 tablet Oral Q6H PRN Marco A Maya APRN   1 tablet at 06/24/19 0608   • labetalol (NORMODYNE,TRANDATE) injection 10 mg  10 mg Intravenous Q4H PRN Marco A Maya APRN       • LORazepam (ATIVAN) injection 0.5 mg  0.5 mg Intravenous Once Galen Bhat MD       • metoprolol tartrate (LOPRESSOR) tablet 50 mg  50 mg Oral Q12H Jasen Holcomb MD       • morphine injection 2 mg  2 mg Intravenous Q2H PRN Galen Bhat MD   2 mg at 06/19/19 2042   • ondansetron (ZOFRAN) injection 4 mg  4 mg Intravenous Q6H PRN Galen Bhat MD   4 mg at 06/16/19  "0247   • sodium chloride 0.9 % flush 3 mL  3 mL Intravenous Q12H Galen Bhat MD   3 mL at 06/23/19 2150   • sodium chloride 0.9 % flush 3-10 mL  3-10 mL Intravenous PRN Galen Bhat MD           Vital Sign Min/Max for last 24 hours  Temp  Min: 97.8 °F (36.6 °C)  Max: 99 °F (37.2 °C)   BP  Min: 103/70  Max: 131/90   Pulse  Min: 83  Max: 120   Resp  Min: 18  Max: 20   SpO2  Min: 92 %  Max: 98 %   No Data Recorded   Weight  Min: 106 kg (234 lb 6 oz)  Max: 106 kg (234 lb 6 oz)     Flowsheet Rows      First Filed Value   Admission Height  147.3 cm (58\") Documented at 06/15/2019 1215   Admission Weight  109 kg (239 lb 9.6 oz) Documented at 06/15/2019 1215               Physical Exam:    General Appearance: Awake, alert, in no acute distress  Eyes: Pupils equal and reactive    Ears: Appear intact with no abnormalities noted  Nose: Nares normal, no drainage  Neck: supple, trachea midline, no carotid bruit and no JVD  Back: no kyphosis present,    Lungs: respirations regular, respirations even and respirations unlabored    Heart: normal S1, S2,  2/6 pansystolic murmur in the left sternal border,  no rub and no click    Abdomen: normal bowel sounds, no tenderness   Skin: no bleeding, bruising or rash  Extremities: no cyanosis  Psychiatric/Behavioral: Negative for agitation, behavioral problems, confusion, the patient does  appear to be nervous/anxious.          Results Review:   I reviewed the patient's new clinical results.  I reviewed the patient's new imaging results and agree with the interpretation.  I reviewed the patient's other test results and agree with the interpretation  I personally viewed and interpreted the patient's EKG/Telemetry data  Discussed with patient    Reviewed available prior notes, consults, prior visits, laboratory findings, radiology and cardiology relevant reports. Updated chart as applicable. I have reviewed the patient's medical history in detail and updated the computerized patient " record as relevant.      Updated patient regarding any new or relevant abnormalities on review of records or any new findings on physical exam. Mentioned to patient about purpose of visit and desirable health short and long term goals and objectives.     Assessment/Plan     Bilateral subdural hematomas (CMS/HCC)  Atrial fibrillation with rapid ventricular response now improved  Essential hypertension  Advanced age  Heart murmur    Plan    Discussed with primary  Check echocardiogram  Increase metoprolol from 25 mg p.o. twice daily to 50 mg p.o. twice daily  After echocardiogram okay to discharge to rehabilitation  Continue other medications  See me in follow-up in 6 weeks    Supportive care  Telemetry  Optimal medical therapy    Deep vein thrombosis prophylaxis/precautions  Appropriate diet, fluid, sodium, caffeine, stimulants intake     Questions were encouraged, asked and answered to the patient's  understanding and satisfaction.    Compliance to diet and medications   Avoid NSAIDS    Jasen Holcomb MD  06/24/19  7:54 AM    EMR Dragon/Transcription was used to dictate part of this note

## 2019-06-24 NOTE — PLAN OF CARE
Problem: Patient Care Overview  Goal: Plan of Care Review  Outcome: Ongoing (interventions implemented as appropriate)   06/24/19 0300   Coping/Psychosocial   Plan of Care Reviewed With patient;daughter   OTHER   Outcome Summary restarted on cardizem drip..continus a fib on tele, see chart for rates. no falls. CLEMENT. up to chair with assist of one. monitoring labs, rhythm. For echo today. Possible d/c to rehab soon.

## 2019-06-24 NOTE — PROGRESS NOTES
Case Management Discharge Note    Final Note: NOTIFIED MARCO ANTONIO AT [facility] OF PT'S D/C TODAY.  PT. WILL BE ADMITTED TO THE SKILLED LEVEL OF CARE.  NURSE, PLEASE CALL REPORT [phone]. THANKS.    Destination - Selection Complete      Service Provider Request Status Selected Services Address Phone Number Fax Number    [facility] Selected Skilled Nursing [address] [address] [phone]      Durable Medical Equipment      No service has been selected for the patient.      Dialysis/Infusion      No service has been selected for the patient.      Home Medical Care      No service has been selected for the patient.      Therapy      No service has been selected for the patient.      Community Resources      No service has been selected for the patient.             Final Discharge Disposition Code: 03 - skilled nursing facility (SNF)

## 2019-06-24 NOTE — THERAPY TREATMENT NOTE
Acute Care - Physical Therapy Treatment Note  [facility]     Patient Name: Madi Mancini  : 1940  MRN: [id number]  Today's Date: 2019  Onset of Illness/Injury or Date of Surgery: 06/15/19  Date of Referral to PT: 19  Referring Physician: Dr. Bhat    Admit Date: 6/15/2019    Visit Dx:    ICD-10-CM ICD-9-CM   1. Bilateral subdural hematomas (CMS/HCC) S06.5X9A 432.1   2. Impaired physical mobility Z74.09 781.99   3. Decreased activities of daily living (ADL) R68.89 780.99     Patient Active Problem List   Diagnosis   • Bilateral subdural hematomas (CMS/HCC)       Therapy Treatment    Rehabilitation Treatment Summary     Row Name 19 1447 19 1040          Treatment Time/Intention    Discipline  physical therapy assistant  -  physical therapy assistant  -     Document Type  therapy note (daily note)  -MF2  --     Subjective Information  complains of;weakness  -2  --     Mode of Treatment  physical therapy  -2  --     Comment  --  Pt. gone for testing.   -     Reason Treatment Not Performed  --  unavailable for treatment  -     Existing Precautions/Restrictions  fall  -2  --     Recorded by [] Brooke Perea, PTA 19 1447  [MF2] Brooke Perea, [facility] 19 1522 [MF] Brooke Perea, [facility] 19 1040     Row Name 19 1447             Bed Mobility Assessment/Treatment    Supine-Sit [place] (Bed Mobility)  verbal cues;contact guard;minimum assist (75% patient effort)  -      Bed Mobility, Safety Issues  decreased use of legs for bridging/pushing  -      Recorded by [MF] Brooke Perea, PTA 19 1522      Row Name 19 1447             Transfer Assessment/Treatment    Comment (Transfers)  stood x 2  -      Recorded by [] Brooke Perea, PTA 19 1522      Row Name 19 144             Sit-Stand Transfer    Sit-Stand [place] (Transfers)  verbal cues;contact guard;minimum assist (75% patient effort)  -       Recorded by [] Brooke Perea, [facility] 06/24/19 1522      Row Name 06/24/19 1447             Stand-Sit Transfer    Stand-Sit [place] (Transfers)  verbal cues;contact guard;minimum assist (75% patient effort)  -MF      Recorded by [] Brooke Perea, [facility] 06/24/19 1522      Row Name 06/24/19 1447             Gait/Stairs Assessment/Training    [place] Level (Gait)  verbal cues;minimum assist (75% patient effort)  -      Assistive Device (Gait)  walker, front-wheeled  -      Distance in Feet (Gait)  15  -MF      Deviations/Abnormal Patterns (Gait)  stride length decreased;shaheen decreased  -MF      Bilateral Gait Deviations  forward flexed posture;heel strike decreased knee flexion  -MF      Comment (Gait/Stairs)  As approaching chair, pt. began to sink. Cues to remain upright.   -MF      Recorded by [] Brooke Perea, [facility] 06/24/19 1522      Row Name 06/24/19 1447             Therapeutic Exercise    Lower Extremity (Therapeutic Exercise)  LAQ (long arc quad), bilateral  -MF      Lower Extremity Range of Motion (Therapeutic Exercise)  hip flexion/extension, bilateral  -MF      Exercise Type (Therapeutic Exercise)  AROM (active range of motion)  -MF      Position (Therapeutic Exercise)  seated  -MF      Sets/Reps (Therapeutic Exercise)  20  -MF      Recorded by [] Brooke Perea, [facility] 06/24/19 1522      Row Name 06/24/19 1447             Positioning and Restraints    Pre-Treatment Position  sitting in chair/recliner  -MF      Post Treatment Position  bed  -MF      In Bed  fowlers;notified nsg;call light within reach;encouraged to call for assist;with family/caregiver;side rails up x2  -MF      Recorded by [] Brooke Perea, [facility] 06/24/19 1522      Row Name 06/24/19 1447             Pain Scale: Numbers Pre/Post-Treatment    Pain Scale: Numbers, Pretreatment  0/10 - no pain  -MF      Pain Scale: Numbers, Post-Treatment  0/10 - no pain  -MF      Recorded by [] Brooke Perea,  PTA 06/24/19 1522      Row Name                Wound 06/16/19 0957 Other (See comments) head incision    Wound - Properties Group Date first assessed: 06/16/19 [LC] Time first assessed: 0957 [LC] Side: Other (See comments) [LC] Location: head [LC] Type: incision [LC] Recorded by:  [] Keyla Fletcher RN 06/16/19 0957      User Key  (r) = Recorded By, (t) = Taken By, (c) = Cosigned By    Initials Name Effective Dates Discipline     Keyla Fletcher RN 08/02/16 -  Nurse    Brooke Castro, PTA 08/02/16 -  PT          Wound 06/16/19 0957 Other (See comments) head incision (Active)   Dressing Appearance open to air 6/24/2019  8:00 AM   Closure None 6/24/2019  8:00 AM   Base dry;clean 6/24/2019  8:00 AM   Periwound dry;intact 6/24/2019  8:00 AM   Drainage Amount none 6/24/2019  8:00 AM   Dressing Care, Wound open to air 6/24/2019  8:00 AM           Physical Therapy Education     Title: PT OT SLP Therapies (In Progress)     Topic: Physical Therapy (In Progress)     Point: Mobility training (Done)     Learning Progress Summary           Patient Acceptance, E, VU by SC at 6/18/2019  9:45 AM    Comment:  SPT/OT reviewed safety concerns and mobility deficits with pt and daughter while mobilizing pt in bed                   Point: Body mechanics (Done)     Learning Progress Summary           Patient Acceptance, E, VU by  at 6/24/2019  3:28 AM    Acceptance, E, VU by SC at 6/18/2019  9:45 AM    Comment:  SPT/OT reviewed safety concerns and mobility deficits with pt and daughter while mobilizing pt in bed                   Point: Precautions (Done)     Learning Progress Summary           Patient Acceptance, E, VU by SC at 6/18/2019  9:45 AM    Comment:  SPT/OT reviewed safety concerns and mobility deficits with pt and daughter while mobilizing pt in bed                               User Key     Initials Effective Dates Name Provider Type Discipline     08/02/16 -  Marlin Nicole RN Registered Nurse Nurse    SC  05/15/19 -  [place], PT Student PT Student PT                PT Recommendation and Plan           Time Calculation:   PT Charges     Row Name 06/24/19 1522             Time Calculation    Start Time  1447  -MF      Stop Time  1515  -MF      Time Calculation (min)  28 min  -      PT Received On  06/24/19  -      PT Goal Re-Cert Due Date  06/28/19  -         Time Calculation- PT    Total Timed Code Minutes- PT  28 minute(s)  -         Timed Charges    67686 - PT Therapeutic Exercise Minutes  15  -MF      73590 - Gait Training Minutes   13  -MF        User Key  (r) = Recorded By, (t) = Taken By, (c) = Cosigned By    Initials Name Provider Type     Brooke Perea PTA Physical Therapy Assistant        Therapy Charges for Today     Code Description Service Date Service Provider Modifiers Qty    64487718107 HC PT THER PROC EA 15 MIN 6/24/2019 Brooke Preea PTA GP 1    71275667438 HC GAIT TRAINING EA 15 MIN 6/24/2019 Brooke Perea PTA GP 1          PT G-Codes  Outcome Measure Options: AM-PAC 6 Clicks Daily Activity (OT)  AM-PAC 6 Clicks Score: 8  Score: 12    Brooke Perea PTA  6/24/2019

## 2019-06-25 ENCOUNTER — NURSE TRIAGE (OUTPATIENT)
Dept: CALL CENTER | Facility: HOSPITAL | Age: 79
End: 2019-06-25

## 2019-06-25 NOTE — TELEPHONE ENCOUNTER
"Daughter states she is thinking the nursing home has medication incorrect. She states they made a pen correction on my paper but I'm not sure nursing home has the correct AVS? Caller advised I'm seeing on the AVS the correction she is asking about but have nursing home call Dr. Holcomb office in the morning for second verification.      Reason for Disposition  • Caller has NON-URGENT medication question about med that PCP prescribed and triager unable to answer question    Additional Information  • Negative: Drug overdose and nurse unable to answer question  • Negative: Caller requesting information not related to medicine  • Negative: Caller requesting a prescription for Strep throat and has a positive culture result  • Negative: Rash while taking a medication or within 3 days of stopping it  • Negative: Immunization reaction suspected  • Negative: [1] Asthma and [2] having symptoms of asthma (cough, wheezing, etc)  • Negative: MORE THAN A DOUBLE DOSE of a prescription or over-the-counter (OTC) drug  • Negative: [1] DOUBLE DOSE (an extra dose or lesser amount) of over-the-counter (OTC) drug AND [2] any symptoms (e.g., dizziness, nausea, pain, sleepiness)  • Negative: [1] DOUBLE DOSE (an extra dose or lesser amount) of prescription drug AND [2] any symptoms (e.g., dizziness, nausea, pain, sleepiness)  • Negative: Took another person's prescription drug  • Negative: [1] DOUBLE DOSE (an extra dose or lesser amount) of prescription drug AND [2] NO symptoms (Exception: a double dose of antibiotics)  • Negative: Diabetes drug error or overdose (e.g., insulin or extra dose)  • Negative: [1] Request for URGENT new prescription or refill of \"essential\" medication (i.e., likelihood of harm to patient if not taken) AND [2] triager unable to fill per unit policy  • Negative: [1] Prescription not at pharmacy AND [2] was prescribed today by PCP  • Negative: Pharmacy calling with prescription questions and triager unable to answer " "question  • Negative: Caller has URGENT medication question about med that PCP prescribed and triager unable to answer question    Answer Assessment - Initial Assessment Questions  1. SYMPTOMS: \"Do you have any symptoms?\"      Daughter asking about AVS medication list states she is thinking incorrect  2. SEVERITY: If symptoms are present, ask \"Are they mild, moderate or severe?\"      No complaint asking for medication clarification    Protocols used: MEDICATION QUESTION CALL-ADULT-      "

## 2019-06-25 NOTE — THERAPY DISCHARGE NOTE
Acute Care - Physical Therapy Discharge Summary  [facility]       Patient Name: Madi Mancini  : 1940  MRN: [id number]    Today's Date: 2019  Onset of Illness/Injury or Date of Surgery: 06/15/19    Date of Referral to PT: 19  Referring Physician: Dr. Bhat      Admit Date: 6/15/2019      PT Recommendation and Plan    Visit Dx:    ICD-10-CM ICD-9-CM   1. Bilateral subdural hematomas (CMS/HCC) S06.5X9A 432.1   2. Impaired physical mobility Z74.09 781.99   3. Decreased activities of daily living (ADL) R68.89 780.99               Rehab Goal Summary     Row Name 19 1212 19 0700          Physical Therapy Goals    Bed Mobility Goal Selection (PT)  bed mobility, PT goal 1  -AB  --     Transfer Goal Selection (PT)  transfer, PT goal 1  -AB  --     Gait Training Goal Selection (PT)  gait training, PT goal 1  -AB  --        Bed Mobility Goal 1 (PT)    Activity/Assistive Device (Bed Mobility Goal 1, PT)  bed mobility activities, all  -AB  --     [place] Level/Cues Needed (Bed Mobility Goal 1, PT)  minimum assist (75% or more patient effort);1 person assist  -AB  --     Time Frame (Bed Mobility Goal 1, PT)  long term goal (LTG);10 days  -AB  --     Progress/Outcomes (Bed Mobility Goal 1, PT)  goal met  -AB  --        Transfer Goal 1 (PT)    Activity/Assistive Device (Transfer Goal 1, PT)  transfers, all;sit-to-stand/stand-to-sit;bed-to-chair/chair-to-bed  -AB  --     [place] Level/Cues Needed (Transfer Goal 1, PT)  minimum assist (75% or more patient effort);1 person assist  -AB  --     Time Frame (Transfer Goal 1, PT)  long term goal (LTG);10 days  -AB  --     Progress/Outcome (Transfer Goal 1, PT)  goal met  -AB  --        Gait Training Goal 1 (PT)    Activity/Assistive Device (Gait Training Goal 1, PT)  gait (walking locomotion);decrease fall risk;increase endurance/gait distance;increase energy conservation;maintain weight bearing status;improve balance and speed  -AB  --      [place] Level (Gait Training Goal 1, PT)  moderate assist (50-74% patient effort);1 person assist  -AB  --     Distance (Gait Goal 1, PT)  50  -AB  --     Time Frame (Gait Training Goal 1, PT)  long term goal (LTG);10 days  -AB  --     Progress/Outcome (Gait Training Goal 1, PT)  goal not met  -AB  --        Bed Mobility Goal 1 (OT)    Activity/Assistive Device (Bed Mobility Goal 1, OT)  --  bed mobility activities, all  -TS     [place] Level/Cues Needed (Bed Mobility Goal 1, OT)  --  minimum assist (75% or more patient effort);2 person assist  -TS     Time Frame (Bed Mobility Goal 1, OT)  --  long term goal (LTG);by discharge  -TS     Progress/Outcomes (Bed Mobility Goal 1, OT)  --  goal not met  -TS        Transfer Goal 1 (OT)    Activity/Assistive Device (Transfer Goal 1, OT)  --  transfers, all  -TS     [place] Level/Cues Needed (Transfer Goal 1, OT)  --  minimum assist (75% or more patient effort);2 person assist  -TS     Time Frame (Transfer Goal 1, OT)  --  long term goal (LTG);by discharge  -TS     Progress/Outcome (Transfer Goal 1, OT)  --  goal not met  -TS        Dressing Goal 1 (OT)    Activity/Assistive Device (Dressing Goal 1, OT)  --  dressing skills, all  -TS     [place]/Cues Needed (Dressing Goal 1, OT)  --  minimum assist (75% or more patient effort)  -TS     Time Frame (Dressing Goal 1, OT)  --  long term goal (LTG);by discharge  -TS     Progress/Outcome (Dressing Goal 1, OT)  --  goal not met  -TS        Toileting Goal 1 (OT)    Activity/Device (Toileting Goal 1, OT)  --  toileting skills, all;commode  -TS     [place] Level/Cues Needed (Toileting Goal 1, OT)  --  minimum assist (75% or more patient effort)  -TS     Time Frame (Toileting Goal 1, OT)  --  long term goal (LTG);by discharge  -TS     Progress/Outcome (Toileting Goal 1, OT)  --  goal not met  -TS       User Key  (r) = Recorded By, (t) = Taken By, (c) = Cosigned By    Initials Name Provider Type Discipline    AB  Valentina Worrell, PTA Physical Therapy Assistant PT    TS Zaria Pack, ARREDONDO/L Occupational Therapy Assistant OT              PT Discharge Summary  Anticipated Discharge Disposition (PT): skilled nursing facility  Reason for Discharge: Discharge from facility  Outcomes Achieved: Refer to plan of care for updates on goals achieved  Discharge Destination: SNF      Valentina Worrell PTA   6/25/2019

## 2019-06-25 NOTE — THERAPY DISCHARGE NOTE
Acute Care - Occupational Therapy Discharge Summary  [facility]     Patient Name: Madi Mancini  : 1940  MRN: [id number]    Today's Date: 2019  Onset of Illness/Injury or Date of Surgery: 06/15/19    Date of Referral to OT: 19  Referring Physician: Dr. Bhat      Admit Date: 6/15/2019        OT Recommendation and Plan    Visit Dx:    ICD-10-CM ICD-9-CM   1. Bilateral subdural hematomas (CMS/HCC) S06.5X9A 432.1   2. Impaired physical mobility Z74.09 781.99   3. Decreased activities of daily living (ADL) R68.89 780.99               Rehab Goal Summary     Row Name 19 0700             Bed Mobility Goal 1 (OT)    Activity/Assistive Device (Bed Mobility Goal 1, OT)  bed mobility activities, all  -TS      [place] Level/Cues Needed (Bed Mobility Goal 1, OT)  minimum assist (75% or more patient effort);2 person assist  -TS      Time Frame (Bed Mobility Goal 1, OT)  long term goal (LTG);by discharge  -TS      Progress/Outcomes (Bed Mobility Goal 1, OT)  goal not met  -TS         Transfer Goal 1 (OT)    Activity/Assistive Device (Transfer Goal 1, OT)  transfers, all  -TS      [place] Level/Cues Needed (Transfer Goal 1, OT)  minimum assist (75% or more patient effort);2 person assist  -TS      Time Frame (Transfer Goal 1, OT)  long term goal (LTG);by discharge  -TS      Progress/Outcome (Transfer Goal 1, OT)  goal not met  -TS         Dressing Goal 1 (OT)    Activity/Assistive Device (Dressing Goal 1, OT)  dressing skills, all  -TS      [place]/Cues Needed (Dressing Goal 1, OT)  minimum assist (75% or more patient effort)  -TS      Time Frame (Dressing Goal 1, OT)  long term goal (LTG);by discharge  -TS      Progress/Outcome (Dressing Goal 1, OT)  goal not met  -TS         Toileting Goal 1 (OT)    Activity/Device (Toileting Goal 1, OT)  toileting skills, all;commode  -TS      [place] Level/Cues Needed (Toileting Goal 1, OT)  minimum assist (75% or more patient effort)  -TS      Time  Frame (Toileting Goal 1, OT)  long term goal (LTG);by discharge  -TS      Progress/Outcome (Toileting Goal 1, OT)  goal not met  -TS        User Key  (r) = Recorded By, (t) = Taken By, (c) = Cosigned By    Initials Name Provider Type Discipline    TS Zaria Pack COTA/L Occupational Therapy Assistant OT              Therapy Suggested Charges     Code   Minutes Charges    89482 (CPT®) Hc Ot Neuromusc Re Education Ea 15 Min      63529 (CPT®) Hc Ot Ther Proc Ea 15 Min      52642 (CPT®) Hc Ot Therapeutic Act Ea 15 Min      57845 (CPT®) Hc Ot Manual Therapy Ea 15 Min      33484 (CPT®) Hc Ot Iontophoresis Ea 15 Min      66761 (CPT®) Hc Ot Elec Stim Ea-Per 15 Min      27955 (CPT®) Hc Ot Ultrasound Ea 15 Min      17667 (CPT®) Hc Ot Self Care/Mgmt/Train Ea 15 Min 12 1    Total  12 1              OT Discharge Summary  Reason for Discharge: Discharge from facility  Outcomes Achieved: Refer to plan of care for updates on goals achieved  Discharge Destination: [facility]      ADAMARIS Heller  6/25/2019

## 2019-06-26 ENCOUNTER — TELEPHONE (OUTPATIENT)
Dept: CARDIOLOGY | Facility: CLINIC | Age: 79
End: 2019-06-26

## 2019-06-26 NOTE — TELEPHONE ENCOUNTER
CALLED BACK AND S/P WITH MARIA DOLORES.  THEY NEED TO KNOW IF HE IS TO BE TAKING NORVASC 5 MG BID AND LISINOPRIL 20MG.        PRIMARY HAS STOPPED LISINOPRIL.      PT'S SISTER STATES THAT YOU JUST WANT HIM ON ONLY  DIGOXIN 125 MCG AND METOPROLOL 50MG     PLEASE ADVISE

## 2019-07-09 ENCOUNTER — HOSPITAL ENCOUNTER (OUTPATIENT)
Dept: CT IMAGING | Facility: HOSPITAL | Age: 79
Discharge: HOME | End: 2019-07-09
Admitting: NURSE PRACTITIONER

## 2019-07-09 ENCOUNTER — OFFICE VISIT (OUTPATIENT)
Dept: NEUROSURGERY | Facility: CLINIC | Age: 79
End: 2019-07-09

## 2019-07-09 VITALS
SYSTOLIC BLOOD PRESSURE: 105 MMHG | BODY MASS INDEX: 35.92 KG/M2 | HEIGHT: 68 IN | WEIGHT: 237 LBS | DIASTOLIC BLOOD PRESSURE: 68 MMHG

## 2019-07-09 DIAGNOSIS — S06.5XAA BILATERAL SUBDURAL HEMATOMAS (HCC): Primary | ICD-10-CM

## 2019-07-09 DIAGNOSIS — S06.5XAA BILATERAL SUBDURAL HEMATOMAS (HCC): ICD-10-CM

## 2019-07-09 DIAGNOSIS — Z87.891 FORMER SMOKER: ICD-10-CM

## 2019-07-09 PROCEDURE — 99024 POSTOP FOLLOW-UP VISIT: CPT | Performed by: NURSE PRACTITIONER

## 2019-07-09 PROCEDURE — 70450 CT HEAD/BRAIN W/O DYE: CPT

## 2019-07-09 NOTE — PROGRESS NOTES
"  Chief complaint:   Chief Complaint   Patient presents with   • Post-op     Suad is returning after a CT scan of his head following a subdural hemorrhage and surgery on June 16th.         Subjective     HPI: This is a 79 y.o. male patient who went to the operating room on 6/16/2019 for a CRANIOTOMY FOR SUBDURAL HEMATOMA - Bilateral. The patient is here in follow up today for postoperative visit.  He states overall he is doing well.  Headache.  Denies any nausea or vomiting.  Denies any vision changes.  He remains at the rehab center team with therapy at this time.  He is on oxygen at this time due to some lung issues.  He is also complaining of some lower extremity swelling at this time which is at the nursing facility.  Overall though he does feel like he is making improvements at this time. He is in a wheelchair at this time        Review of Systems   Musculoskeletal: Negative.    Neurological: Positive for weakness. Negative for dizziness and headaches.         Objective      Vital Signs  /68 (BP Location: Right arm, Patient Position: Sitting)   Ht 172.7 cm (68\")   Wt 108 kg (237 lb)   BMI 36.04 kg/m²     Physical Exam   Constitutional: He is oriented to person, place, and time. He appears well-developed and well-nourished.   HENT:   Head: Normocephalic.   Eyes: Conjunctivae, EOM and lids are normal. Pupils are equal, round, and reactive to light.   Neck: Normal range of motion.   Cardiovascular: Normal rate, regular rhythm and normal heart sounds.   Pulmonary/Chest: Effort normal and breath sounds normal.   Abdominal: Normal appearance.   Musculoskeletal: Normal range of motion.   Neurological: He is alert and oriented to person, place, and time. He has normal strength and normal reflexes. He displays normal reflexes. No cranial nerve deficit or sensory deficit. Gait abnormal. GCS eye subscore is 4. GCS verbal subscore is 5. GCS motor subscore is 6.   Skin: Skin is warm.   Psychiatric: He has a " normal mood and affect. His speech is normal and behavior is normal. Thought content normal. Cognition and memory are normal.     Incisions clean dry and intact    Results Review: CT scan of the head without contrast shows the patient does still have bilateral fluid collections in the frontal portion of the brain.  There is no pneumocephalus that I can appreciate.  There is less mass-effect and no significant midline shift.  The right side is worse than the left.          Assessment/Plan: The patient is doing good from a skin standpoint and is appear to be functioning better at this time.  We are going to follow with him again in 3 to 4 weeks with a repeat CT scan with contrast.  He was told to call us if he had any worsening issues.    BMI shows the patient is Obese. BMI chart was given to the patient. Patient is a nonsmoker    Madi was seen today for post-op.    Diagnoses and all orders for this visit:    Bilateral subdural hematomas (CMS/HCC)  -     CT Head Without Contrast; Future    BMI 36.0-36.9,adult    Former smoker        I discussed the patients findings and my recommendations with patient  Marco A Maya, ALONDRA  07/09/19  3:46 PM

## 2019-07-09 NOTE — PATIENT INSTRUCTIONS

## 2019-07-22 ENCOUNTER — TELEPHONE (OUTPATIENT)
Dept: CARDIOLOGY | Facility: CLINIC | Age: 79
End: 2019-07-22

## 2019-07-22 NOTE — TELEPHONE ENCOUNTER
DAUGHTER CALLED PT WAS RECENTLY IN HOSPITAL FOR AFIB, HE IS CURRENTLY IN NURSING HOME IN [place].  DAUGHTER STATS PT IS HAVING EDEMA IN LEGS WITH SOB, AND FATIGUE.  HE HAS AN APT WITH YOU ON 08/07/2019    PLEASE ADVISE

## 2019-07-25 NOTE — TELEPHONE ENCOUNTER
Recommend low-sodium diet  Attending at the nursing home can start him on low-dose diuretic therapy and monitor kidney functions and electrolytes  Keep follow-up appointment with

## 2019-07-25 NOTE — TELEPHONE ENCOUNTER
NOTIFIED DAUGHTER DAMON, SHE REQUESTED I CALL [facility].  [phone]  CALLED AND SPOKE TO NURSE RICK, SHE STATED PT IS ALREADY STARTED ON LASIX 40 MG QD AND HAD A CHEST X RAY YESTERDAY THAT SHOWED LEFT LOWER LOBE PNEUMONIA.      DAUGHTER WILL KEEP APT WITH YOU

## 2019-08-01 ENCOUNTER — TRANSCRIBE ORDERS (OUTPATIENT)
Dept: ADMINISTRATIVE | Facility: HOSPITAL | Age: 79
End: 2019-08-01

## 2019-08-01 DIAGNOSIS — M25.511 CHRONIC RIGHT SHOULDER PAIN: Primary | ICD-10-CM

## 2019-08-01 DIAGNOSIS — M75.101 TEAR OF RIGHT ROTATOR CUFF, UNSPECIFIED TEAR EXTENT: ICD-10-CM

## 2019-08-01 DIAGNOSIS — G89.29 CHRONIC RIGHT SHOULDER PAIN: Primary | ICD-10-CM

## 2019-08-05 ENCOUNTER — OFFICE VISIT (OUTPATIENT)
Dept: CARDIOLOGY | Facility: CLINIC | Age: 79
End: 2019-08-05

## 2019-08-05 ENCOUNTER — HOSPITAL ENCOUNTER (INPATIENT)
Facility: HOSPITAL | Age: 79
LOS: 8 days | Discharge: NURSING, REHAB OR LONG-TERM CARE | End: 2019-08-14
Attending: INTERNAL MEDICINE | Admitting: INTERNAL MEDICINE

## 2019-08-05 ENCOUNTER — OFFICE VISIT (OUTPATIENT)
Dept: NEUROSURGERY | Facility: CLINIC | Age: 79
End: 2019-08-05

## 2019-08-05 ENCOUNTER — APPOINTMENT (OUTPATIENT)
Dept: CT IMAGING | Facility: HOSPITAL | Age: 79
End: 2019-08-05

## 2019-08-05 ENCOUNTER — HOSPITAL ENCOUNTER (OUTPATIENT)
Dept: CT IMAGING | Facility: HOSPITAL | Age: 79
Discharge: HOME | End: 2019-08-05

## 2019-08-05 VITALS
SYSTOLIC BLOOD PRESSURE: 115 MMHG | OXYGEN SATURATION: 98 % | BODY MASS INDEX: 35.92 KG/M2 | WEIGHT: 237 LBS | HEART RATE: 96 BPM | HEIGHT: 68 IN | DIASTOLIC BLOOD PRESSURE: 80 MMHG

## 2019-08-05 DIAGNOSIS — S06.5XAA BILATERAL SUBDURAL HEMATOMAS (HCC): Primary | ICD-10-CM

## 2019-08-05 DIAGNOSIS — I48.20 ATRIAL FIBRILLATION, CHRONIC (HCC): ICD-10-CM

## 2019-08-05 DIAGNOSIS — I10 ESSENTIAL HYPERTENSION: ICD-10-CM

## 2019-08-05 DIAGNOSIS — R60.0 PEDAL EDEMA: ICD-10-CM

## 2019-08-05 DIAGNOSIS — S06.5XAA BILATERAL SUBDURAL HEMATOMAS (HCC): ICD-10-CM

## 2019-08-05 DIAGNOSIS — Z87.891 FORMER SMOKER: Primary | ICD-10-CM

## 2019-08-05 PROBLEM — I50.9 CHF (CONGESTIVE HEART FAILURE) (HCC): Status: ACTIVE | Noted: 2019-08-05

## 2019-08-05 LAB
ALBUMIN SERPL-MCNC: 3.7 G/DL (ref 3.5–5)
ALBUMIN/GLOB SERPL: 1.3 G/DL (ref 1.1–2.5)
ALP SERPL-CCNC: 69 U/L (ref 24–120)
ALT SERPL W P-5'-P-CCNC: 20 U/L (ref 0–54)
ANION GAP SERPL CALCULATED.3IONS-SCNC: 9 MMOL/L (ref 4–13)
AST SERPL-CCNC: 29 U/L (ref 7–45)
BASOPHILS # BLD AUTO: 0.05 10*3/MM3 (ref 0–0.2)
BASOPHILS NFR BLD AUTO: 0.5 % (ref 0–2)
BILIRUB SERPL-MCNC: 0.7 MG/DL (ref 0.1–1)
BUN BLD-MCNC: 29 MG/DL (ref 5–21)
BUN/CREAT SERPL: 30.9 (ref 7–25)
CALCIUM SPEC-SCNC: 8.4 MG/DL (ref 8.4–10.4)
CHLORIDE SERPL-SCNC: 96 MMOL/L (ref 98–110)
CO2 SERPL-SCNC: 29 MMOL/L (ref 24–31)
CREAT BLD-MCNC: 0.94 MG/DL (ref 0.5–1.4)
D DIMER PPP FEU-MCNC: 14.24 MG/L (FEU) (ref 0–0.5)
DEPRECATED RDW RBC AUTO: 43.8 FL (ref 40–54)
EOSINOPHIL # BLD AUTO: 0.15 10*3/MM3 (ref 0–0.7)
EOSINOPHIL NFR BLD AUTO: 1.6 % (ref 0–4)
ERYTHROCYTE [DISTWIDTH] IN BLOOD BY AUTOMATED COUNT: 14 % (ref 12–15)
GFR SERPL CREATININE-BSD FRML MDRD: 77 ML/MIN/1.73
GLOBULIN UR ELPH-MCNC: 2.9 GM/DL
GLUCOSE BLD-MCNC: 199 MG/DL (ref 70–100)
HCT VFR BLD AUTO: 38.2 % (ref 40–52)
HGB BLD-MCNC: 12.3 G/DL (ref 14–18)
INR PPP: 1.86 (ref 0.91–1.09)
LYMPHOCYTES # BLD AUTO: 1.9 10*3/MM3 (ref 0.72–4.86)
LYMPHOCYTES NFR BLD AUTO: 19.9 % (ref 15–45)
MCH RBC QN AUTO: 27.6 PG (ref 28–32)
MCHC RBC AUTO-ENTMCNC: 32.2 G/DL (ref 33–36)
MCV RBC AUTO: 85.8 FL (ref 82–95)
MONOCYTES # BLD AUTO: 0.81 10*3/MM3 (ref 0.19–1.3)
MONOCYTES NFR BLD AUTO: 8.5 % (ref 4–12)
NEUTROPHILS # BLD AUTO: 6.58 10*3/MM3 (ref 1.87–8.4)
NEUTROPHILS NFR BLD AUTO: 69.1 % (ref 39–78)
NT-PROBNP SERPL-MCNC: 5730 PG/ML (ref 0–1800)
PLATELET # BLD AUTO: 103 10*3/MM3 (ref 130–400)
PMV BLD AUTO: 10.2 FL (ref 6–12)
POTASSIUM BLD-SCNC: 3.8 MMOL/L (ref 3.5–5.3)
PROT SERPL-MCNC: 6.6 G/DL (ref 6.3–8.7)
PROTHROMBIN TIME: 22.1 SECONDS (ref 11.9–14.6)
RBC # BLD AUTO: 4.45 10*6/MM3 (ref 4.8–5.9)
SODIUM BLD-SCNC: 134 MMOL/L (ref 135–145)
TROPONIN I SERPL-MCNC: <0.012 NG/ML (ref 0–0.03)
TSH SERPL DL<=0.05 MIU/L-ACNC: 0.81 MIU/ML (ref 0.47–4.68)
WBC NRBC COR # BLD: 9.53 10*3/MM3 (ref 4.8–10.8)

## 2019-08-05 PROCEDURE — 80053 COMPREHEN METABOLIC PANEL: CPT | Performed by: INTERNAL MEDICINE

## 2019-08-05 PROCEDURE — G0378 HOSPITAL OBSERVATION PER HR: HCPCS

## 2019-08-05 PROCEDURE — 99214 OFFICE O/P EST MOD 30 MIN: CPT | Performed by: INTERNAL MEDICINE

## 2019-08-05 PROCEDURE — 94799 UNLISTED PULMONARY SVC/PX: CPT

## 2019-08-05 PROCEDURE — 83880 ASSAY OF NATRIURETIC PEPTIDE: CPT | Performed by: INTERNAL MEDICINE

## 2019-08-05 PROCEDURE — 85379 FIBRIN DEGRADATION QUANT: CPT | Performed by: INTERNAL MEDICINE

## 2019-08-05 PROCEDURE — 85610 PROTHROMBIN TIME: CPT | Performed by: INTERNAL MEDICINE

## 2019-08-05 PROCEDURE — 70450 CT HEAD/BRAIN W/O DYE: CPT

## 2019-08-05 PROCEDURE — 71275 CT ANGIOGRAPHY CHEST: CPT

## 2019-08-05 PROCEDURE — 84443 ASSAY THYROID STIM HORMONE: CPT | Performed by: INTERNAL MEDICINE

## 2019-08-05 PROCEDURE — 85025 COMPLETE CBC W/AUTO DIFF WBC: CPT | Performed by: INTERNAL MEDICINE

## 2019-08-05 PROCEDURE — 94760 N-INVAS EAR/PLS OXIMETRY 1: CPT

## 2019-08-05 PROCEDURE — 0 IOPAMIDOL PER 1 ML: Performed by: INTERNAL MEDICINE

## 2019-08-05 PROCEDURE — 93000 ELECTROCARDIOGRAM COMPLETE: CPT | Performed by: INTERNAL MEDICINE

## 2019-08-05 PROCEDURE — 84484 ASSAY OF TROPONIN QUANT: CPT | Performed by: INTERNAL MEDICINE

## 2019-08-05 RX ORDER — BUMETANIDE 0.25 MG/ML
1 INJECTION INTRAMUSCULAR; INTRAVENOUS ONCE
Status: COMPLETED | OUTPATIENT
Start: 2019-08-05 | End: 2019-08-05

## 2019-08-05 RX ORDER — ACETAMINOPHEN 325 MG/1
650 TABLET ORAL EVERY 4 HOURS PRN
Status: DISCONTINUED | OUTPATIENT
Start: 2019-08-05 | End: 2019-08-14 | Stop reason: HOSPADM

## 2019-08-05 RX ORDER — TAMOXIFEN CITRATE 20 MG/1
20 TABLET ORAL DAILY
Status: DISCONTINUED | OUTPATIENT
Start: 2019-08-05 | End: 2019-08-14 | Stop reason: HOSPADM

## 2019-08-05 RX ORDER — LISINOPRIL 20 MG/1
TABLET ORAL
Status: ON HOLD | COMMUNITY
Start: 2019-07-15 | End: 2019-08-07 | Stop reason: ALTCHOICE

## 2019-08-05 RX ORDER — METOPROLOL TARTRATE 50 MG/1
50 TABLET, FILM COATED ORAL EVERY 12 HOURS SCHEDULED
Status: DISCONTINUED | OUTPATIENT
Start: 2019-08-05 | End: 2019-08-14 | Stop reason: HOSPADM

## 2019-08-05 RX ORDER — AMLODIPINE BESYLATE 5 MG/1
5 TABLET ORAL 2 TIMES DAILY
COMMUNITY
Start: 2019-07-21

## 2019-08-05 RX ORDER — SODIUM CHLORIDE 0.9 % (FLUSH) 0.9 %
3 SYRINGE (ML) INJECTION EVERY 12 HOURS SCHEDULED
Status: DISCONTINUED | OUTPATIENT
Start: 2019-08-05 | End: 2019-08-14 | Stop reason: HOSPADM

## 2019-08-05 RX ORDER — HYDROCODONE BITARTRATE AND ACETAMINOPHEN 5; 325 MG/1; MG/1
1 TABLET ORAL EVERY 6 HOURS PRN
Status: ON HOLD | COMMUNITY
End: 2019-08-14 | Stop reason: SDUPTHER

## 2019-08-05 RX ORDER — SODIUM CHLORIDE 0.9 % (FLUSH) 0.9 %
3-10 SYRINGE (ML) INJECTION AS NEEDED
Status: DISCONTINUED | OUTPATIENT
Start: 2019-08-05 | End: 2019-08-14 | Stop reason: HOSPADM

## 2019-08-05 RX ORDER — LISINOPRIL 5 MG/1
5 TABLET ORAL
Status: DISCONTINUED | OUTPATIENT
Start: 2019-08-05 | End: 2019-08-14 | Stop reason: HOSPADM

## 2019-08-05 RX ORDER — ROSUVASTATIN CALCIUM 20 MG/1
20 TABLET, COATED ORAL NIGHTLY
Status: DISCONTINUED | OUTPATIENT
Start: 2019-08-05 | End: 2019-08-14 | Stop reason: HOSPADM

## 2019-08-05 RX ORDER — DIGOXIN 125 MCG
125 TABLET ORAL
Status: DISCONTINUED | OUTPATIENT
Start: 2019-08-06 | End: 2019-08-14 | Stop reason: HOSPADM

## 2019-08-05 RX ORDER — HYDROCODONE BITARTRATE AND ACETAMINOPHEN 10; 325 MG/1; MG/1
1 TABLET ORAL EVERY 6 HOURS PRN
Status: DISCONTINUED | OUTPATIENT
Start: 2019-08-05 | End: 2019-08-14 | Stop reason: HOSPADM

## 2019-08-05 RX ORDER — METOLAZONE 2.5 MG/1
2.5 TABLET ORAL DAILY
Status: DISCONTINUED | OUTPATIENT
Start: 2019-08-05 | End: 2019-08-09

## 2019-08-05 RX ADMIN — IOPAMIDOL 79 ML: 755 INJECTION, SOLUTION INTRAVENOUS at 21:43

## 2019-08-05 RX ADMIN — HYDROCODONE BITARTRATE AND ACETAMINOPHEN 1 TABLET: 10; 325 TABLET ORAL at 17:54

## 2019-08-05 RX ADMIN — ROSUVASTATIN CALCIUM 20 MG: 20 TABLET, FILM COATED ORAL at 20:17

## 2019-08-05 RX ADMIN — TAMOXIFEN CITRATE 20 MG: 20 TABLET ORAL at 17:44

## 2019-08-05 RX ADMIN — LISINOPRIL 5 MG: 5 TABLET ORAL at 17:44

## 2019-08-05 RX ADMIN — SODIUM CHLORIDE, PRESERVATIVE FREE 3 ML: 5 INJECTION INTRAVENOUS at 20:18

## 2019-08-05 RX ADMIN — METOPROLOL TARTRATE 50 MG: 50 TABLET ORAL at 20:17

## 2019-08-05 RX ADMIN — METOLAZONE 2.5 MG: 2.5 TABLET ORAL at 17:44

## 2019-08-05 RX ADMIN — BUMETANIDE 1 MG: 0.25 INJECTION INTRAMUSCULAR; INTRAVENOUS at 17:44

## 2019-08-05 NOTE — H&P (VIEW-ONLY)
Madi Mancini  [id number]  1940  79 y.o.  male    Referring Provider: RHETT Mcgee MD    Reason for  Visit: Here for routine follow up   congestive heart failure   subdural hematoma 5/8/19  Massive pedal edema    Subjective    Moderate chronic exertional shortness of breath on exertion relieved with rest  No significant cough or wheezing  Going on for several months or longer    No palpitations  No associated chest pain  Massive pedal edema    No fever or chills  No significant expectoration    No hemoptysis  No presyncope or syncope       History of present illness:  Madi Mancini is a 79 y.o. yo male with history of subdural hematoma s/p craniotomy  who presents today for   Chief Complaint   Patient presents with   • Atrial Fibrillation     6 wk d/c   • Shortness of Breath   • Edema   .    History  Past Medical History:   Diagnosis Date   • Atrial fibrillation (CMS/HCC)    • Elevated cholesterol    • Hypertension    ,   Past Surgical History:   Procedure Laterality Date   • APPENDECTOMY     • CRANIOTOMY FOR SUBDURAL HEMATOMA Bilateral 6/16/2019    Procedure: CRANIOTOMY FOR SUBDURAL HEMATOMA;  Surgeon: Galen Bhat MD;  Location: [facility] OR;  Service: Neurosurgery   • HEMORROIDECTOMY     • UMBILICAL HERNIA REPAIR     ,   Family History   Problem Relation Age of Onset   • Cancer Mother    • Cancer Father    • No Known Problems Sister    • No Known Problems Brother    • COPD Brother    • Cancer Brother    ,   Social History     Tobacco Use   • Smoking status: Former Smoker   • Smokeless tobacco: Former User     Types: Chew     Quit date: 2009   • Tobacco comment: quit at age 20   Substance Use Topics   • Alcohol use: No     Frequency: Never   • Drug use: No   ,     Medications  Current Outpatient Medications   Medication Sig Dispense Refill   • acetaminophen (TYLENOL) 325 MG tablet Take 2 tablets by mouth Every 4 (Four) Hours As Needed for Mild Pain  or Moderate Pain . 100 tablet 2   • amLODIPine  "(NORVASC) 5 MG tablet      • digoxin (LANOXIN) 125 MCG tablet Take 1 tablet by mouth Daily.     • HYDROcodone-acetaminophen (NORCO)  MG per tablet Take 1 tablet by mouth Every 6 (Six) Hours As Needed for Moderate Pain .     • lisinopril (PRINIVIL,ZESTRIL) 20 MG tablet      • metoprolol tartrate (LOPRESSOR) 50 MG tablet Take 1 tablet by mouth Every 12 (Twelve) Hours.     • O2 (OXYGEN) Inhale 4 L/min 1 (One) Time.     • rosuvastatin (CRESTOR) 20 MG tablet Take 20 mg by mouth Daily.     • tamoxifen (NOLVADEX) 20 MG chemo tablet Take 20 mg by mouth Daily.       No current facility-administered medications for this visit.        Allergies:  Penicillins    Review of Systems  Review of Systems   Constitution: Positive for weakness and malaise/fatigue.   HENT: Negative.    Eyes: Negative.    Cardiovascular: Positive for dyspnea on exertion and leg swelling. Negative for chest pain, claudication, cyanosis, irregular heartbeat, near-syncope, orthopnea, palpitations, paroxysmal nocturnal dyspnea and syncope.   Respiratory: Negative.    Endocrine: Negative.    Hematologic/Lymphatic: Negative.    Skin: Negative.    Musculoskeletal: Positive for arthritis, back pain and joint pain.   Gastrointestinal: Negative for anorexia.   Genitourinary: Negative.    Psychiatric/Behavioral: Negative.        Objective     Physical Exam:  /80   Pulse 96   Ht 172.7 cm (68\")   Wt 108 kg (237 lb)   SpO2 98%   BMI 36.04 kg/m²     Physical Exam   Constitutional: He appears well-developed.   HENT:   Head: Normocephalic.   Neck: Normal carotid pulses and no JVD present. No tracheal tenderness present. Carotid bruit is not present. No tracheal deviation and no edema present.   Cardiovascular: Regular rhythm and normal pulses.   Murmur heard.   Systolic murmur is present with a grade of 3/6.  Pulmonary/Chest: Effort normal. No stridor.   Abdominal: Soft.     Vascular Status -  His right foot exhibits abnormal foot edema. His left foot " exhibits abnormal foot edema.  Neurological: He is alert. He has normal strength. No cranial nerve deficit or sensory deficit.   Skin: Skin is warm.   Psychiatric: He has a normal mood and affect. His speech is normal and behavior is normal.       Results Review:    Advisory     Will see the patient soon per appointment or sooner if required  Patient advised in advance that there may be a longer wait time next follow up with  The patient given the option to see another provider that the patient declined          ECG 12 Lead  Date/Time: 8/5/2019 10:47 AM  Performed by: Jasen Holcomb MD  Authorized by: Jasen Holcomb MD   Comparison: compared with previous ECG from 6/22/2019  Comparison to previous ECG: Ventricular rate decreased from  132  to  91 beats per minute    Rhythm: atrial fibrillation  Rate: normal  Conduction: conduction normal  Q waves: V1, V2 and V3    QRS axis: right  Other findings: non-specific ST-T wave changes    Clinical impression: abnormal EKG            Assessment/Plan   Madi was seen today for atrial fibrillation, shortness of breath and edema.    Diagnoses and all orders for this visit:    Former smoker    BMI 36.0-36.9,adult    Bilateral subdural hematomas (CMS/HCC)    Atrial fibrillation, chronic (CMS/HCC)    Essential hypertension    Pedal edema    Other orders  -     ECG 12 Lead           Plan      Admit and start IV diuretic therapy    ____________________________________________________________________________________________________________________________________________  Health maintenance and recommendations      Low salt/ HTN/ Heart healthy carbohydrate restricted cardiac diet   The patient is advised to reduce or avoid caffeine or other cardiac stimulants.     Minimize or avoid  NSAID-type medications        Monitor for any signs of bleeding including red or dark stools. Fall precautions.        Advised staying uptodate with immunizations per established standard  guidelines.      Offered to give patient  a copy of my notes       Questions were encouraged, asked and answered to the patient's  understanding and satisfaction. Questions if any regarding current medications and side effects, need for refills and importance of compliance to medications stressed.    Reviewed available prior notes, consults, prior visits, laboratory findings, radiology and cardiology relevant reports. Updated chart as applicable. I have reviewed the patient's medical history in detail and updated the computerized patient record as relevant.      Updated patient regarding any new or relevant abnormalities on review of records or any new findings on physical exam. Mentioned to patient about purpose of visit and desirable health short and long term goals and objectives.    Primary to monitor CBC CMP Lipid panel and TSH as applicable    ___________________________________________________________________________________________________________________________________________          Return in about 6 weeks (around 9/16/2019).

## 2019-08-05 NOTE — PROGRESS NOTES
Madi Mancini  [id number]  1940  79 y.o.  male    Referring Provider: RHETT Mcgee MD    Reason for  Visit: Here for routine follow up   congestive heart failure   subdural hematoma 5/8/19  Massive pedal edema    Subjective    Moderate chronic exertional shortness of breath on exertion relieved with rest  No significant cough or wheezing  Going on for several months or longer    No palpitations  No associated chest pain  Massive pedal edema    No fever or chills  No significant expectoration    No hemoptysis  No presyncope or syncope       History of present illness:  Madi Mancini is a 79 y.o. yo male with history of subdural hematoma s/p craniotomy  who presents today for   Chief Complaint   Patient presents with   • Atrial Fibrillation     6 wk d/c   • Shortness of Breath   • Edema   .    History  Past Medical History:   Diagnosis Date   • Atrial fibrillation (CMS/HCC)    • Elevated cholesterol    • Hypertension    ,   Past Surgical History:   Procedure Laterality Date   • APPENDECTOMY     • CRANIOTOMY FOR SUBDURAL HEMATOMA Bilateral 6/16/2019    Procedure: CRANIOTOMY FOR SUBDURAL HEMATOMA;  Surgeon: Galen Bhat MD;  Location: [facility] OR;  Service: Neurosurgery   • HEMORROIDECTOMY     • UMBILICAL HERNIA REPAIR     ,   Family History   Problem Relation Age of Onset   • Cancer Mother    • Cancer Father    • No Known Problems Sister    • No Known Problems Brother    • COPD Brother    • Cancer Brother    ,   Social History     Tobacco Use   • Smoking status: Former Smoker   • Smokeless tobacco: Former User     Types: Chew     Quit date: 2009   • Tobacco comment: quit at age 20   Substance Use Topics   • Alcohol use: No     Frequency: Never   • Drug use: No   ,     Medications  Current Outpatient Medications   Medication Sig Dispense Refill   • acetaminophen (TYLENOL) 325 MG tablet Take 2 tablets by mouth Every 4 (Four) Hours As Needed for Mild Pain  or Moderate Pain . 100 tablet 2   • amLODIPine  "(NORVASC) 5 MG tablet      • digoxin (LANOXIN) 125 MCG tablet Take 1 tablet by mouth Daily.     • HYDROcodone-acetaminophen (NORCO)  MG per tablet Take 1 tablet by mouth Every 6 (Six) Hours As Needed for Moderate Pain .     • lisinopril (PRINIVIL,ZESTRIL) 20 MG tablet      • metoprolol tartrate (LOPRESSOR) 50 MG tablet Take 1 tablet by mouth Every 12 (Twelve) Hours.     • O2 (OXYGEN) Inhale 4 L/min 1 (One) Time.     • rosuvastatin (CRESTOR) 20 MG tablet Take 20 mg by mouth Daily.     • tamoxifen (NOLVADEX) 20 MG chemo tablet Take 20 mg by mouth Daily.       No current facility-administered medications for this visit.        Allergies:  Penicillins    Review of Systems  Review of Systems   Constitution: Positive for weakness and malaise/fatigue.   HENT: Negative.    Eyes: Negative.    Cardiovascular: Positive for dyspnea on exertion and leg swelling. Negative for chest pain, claudication, cyanosis, irregular heartbeat, near-syncope, orthopnea, palpitations, paroxysmal nocturnal dyspnea and syncope.   Respiratory: Negative.    Endocrine: Negative.    Hematologic/Lymphatic: Negative.    Skin: Negative.    Musculoskeletal: Positive for arthritis, back pain and joint pain.   Gastrointestinal: Negative for anorexia.   Genitourinary: Negative.    Psychiatric/Behavioral: Negative.        Objective     Physical Exam:  /80   Pulse 96   Ht 172.7 cm (68\")   Wt 108 kg (237 lb)   SpO2 98%   BMI 36.04 kg/m²     Physical Exam   Constitutional: He appears well-developed.   HENT:   Head: Normocephalic.   Neck: Normal carotid pulses and no JVD present. No tracheal tenderness present. Carotid bruit is not present. No tracheal deviation and no edema present.   Cardiovascular: Regular rhythm and normal pulses.   Murmur heard.   Systolic murmur is present with a grade of 3/6.  Pulmonary/Chest: Effort normal. No stridor.   Abdominal: Soft.     Vascular Status -  His right foot exhibits abnormal foot edema. His left foot " exhibits abnormal foot edema.  Neurological: He is alert. He has normal strength. No cranial nerve deficit or sensory deficit.   Skin: Skin is warm.   Psychiatric: He has a normal mood and affect. His speech is normal and behavior is normal.       Results Review:    Advisory     Will see the patient soon per appointment or sooner if required  Patient advised in advance that there may be a longer wait time next follow up with  The patient given the option to see another provider that the patient declined          ECG 12 Lead  Date/Time: 8/5/2019 10:47 AM  Performed by: Jasen Holcomb MD  Authorized by: Jasen Holcomb MD   Comparison: compared with previous ECG from 6/22/2019  Comparison to previous ECG: Ventricular rate decreased from  132  to  91 beats per minute    Rhythm: atrial fibrillation  Rate: normal  Conduction: conduction normal  Q waves: V1, V2 and V3    QRS axis: right  Other findings: non-specific ST-T wave changes    Clinical impression: abnormal EKG            Assessment/Plan   Madi was seen today for atrial fibrillation, shortness of breath and edema.    Diagnoses and all orders for this visit:    Former smoker    BMI 36.0-36.9,adult    Bilateral subdural hematomas (CMS/HCC)    Atrial fibrillation, chronic (CMS/HCC)    Essential hypertension    Pedal edema    Other orders  -     ECG 12 Lead           Plan      Admit and start IV diuretic therapy    ____________________________________________________________________________________________________________________________________________  Health maintenance and recommendations      Low salt/ HTN/ Heart healthy carbohydrate restricted cardiac diet   The patient is advised to reduce or avoid caffeine or other cardiac stimulants.     Minimize or avoid  NSAID-type medications        Monitor for any signs of bleeding including red or dark stools. Fall precautions.        Advised staying uptodate with immunizations per established standard  guidelines.      Offered to give patient  a copy of my notes       Questions were encouraged, asked and answered to the patient's  understanding and satisfaction. Questions if any regarding current medications and side effects, need for refills and importance of compliance to medications stressed.    Reviewed available prior notes, consults, prior visits, laboratory findings, radiology and cardiology relevant reports. Updated chart as applicable. I have reviewed the patient's medical history in detail and updated the computerized patient record as relevant.      Updated patient regarding any new or relevant abnormalities on review of records or any new findings on physical exam. Mentioned to patient about purpose of visit and desirable health short and long term goals and objectives.    Primary to monitor CBC CMP Lipid panel and TSH as applicable    ___________________________________________________________________________________________________________________________________________          Return in about 6 weeks (around 9/16/2019).

## 2019-08-05 NOTE — NURSING NOTE
IV insertion attempt to right forearm with no success, second IV insertion was to RAC with blood return noted, flushes well.  Patient tolerated well

## 2019-08-05 NOTE — NURSING NOTE
Patient and daughters said they were due to have an open MRI at imaging center at 1445 that they didn't get to go to and are requesting that he have it done here. Patient is fearful of confined spaces so he said he requires the open MRI

## 2019-08-05 NOTE — NURSING NOTE
Patient just arrived to the floor.  He has been in the system as being in the room but he has not been on the floor until now.  Patient has been in other parts of the hospital.

## 2019-08-05 NOTE — PLAN OF CARE
Problem: Patient Care Overview  Goal: Plan of Care Review  Outcome: Ongoing (interventions implemented as appropriate)   08/05/19 1600   Coping/Psychosocial   Plan of Care Reviewed With patient;daughter   Plan of Care Review   Progress no change   OTHER   Outcome Summary patient is a direct admit from Dr Holcomb with CHF including 3plus edema to BLE's. Patient was supposed to have open MRI at outpatient MRI center today to look at his right shoulder. Patient did not have that done today and is wanting it done. Patient currently awaiting dr orders that will include diuresing, IV placed in LAC, pt daughters at bedside, no other issues       Problem: Cardiac: Heart Failure (Adult)  Goal: Signs and Symptoms of Listed Potential Problems Will be Absent, Minimized or Managed (Cardiac: Heart Failure)  Outcome: Ongoing (interventions implemented as appropriate)      Problem: Fall Risk (Adult)  Goal: Absence of Fall  Outcome: Ongoing (interventions implemented as appropriate)

## 2019-08-06 ENCOUNTER — APPOINTMENT (OUTPATIENT)
Dept: ULTRASOUND IMAGING | Facility: HOSPITAL | Age: 79
End: 2019-08-06

## 2019-08-06 ENCOUNTER — APPOINTMENT (OUTPATIENT)
Dept: CARDIOLOGY | Facility: HOSPITAL | Age: 79
End: 2019-08-06

## 2019-08-06 PROBLEM — I50.9 CONGESTIVE CARDIAC FAILURE (HCC): Status: ACTIVE | Noted: 2019-08-06

## 2019-08-06 LAB
ANION GAP SERPL CALCULATED.3IONS-SCNC: 9 MMOL/L (ref 4–13)
BUN BLD-MCNC: 26 MG/DL (ref 5–21)
BUN/CREAT SERPL: 31 (ref 7–25)
CALCIUM SPEC-SCNC: 8.3 MG/DL (ref 8.4–10.4)
CHLORIDE SERPL-SCNC: 93 MMOL/L (ref 98–110)
CO2 SERPL-SCNC: 32 MMOL/L (ref 24–31)
CREAT BLD-MCNC: 0.84 MG/DL (ref 0.5–1.4)
GFR SERPL CREATININE-BSD FRML MDRD: 88 ML/MIN/1.73
GLUCOSE BLD-MCNC: 169 MG/DL (ref 70–100)
POTASSIUM BLD-SCNC: 4 MMOL/L (ref 3.5–5.3)
SODIUM BLD-SCNC: 134 MMOL/L (ref 135–145)

## 2019-08-06 PROCEDURE — 93306 TTE W/DOPPLER COMPLETE: CPT

## 2019-08-06 PROCEDURE — 99233 SBSQ HOSP IP/OBS HIGH 50: CPT | Performed by: INTERNAL MEDICINE

## 2019-08-06 PROCEDURE — 94799 UNLISTED PULMONARY SVC/PX: CPT

## 2019-08-06 PROCEDURE — 93306 TTE W/DOPPLER COMPLETE: CPT | Performed by: INTERNAL MEDICINE

## 2019-08-06 PROCEDURE — 93970 EXTREMITY STUDY: CPT

## 2019-08-06 PROCEDURE — 94760 N-INVAS EAR/PLS OXIMETRY 1: CPT

## 2019-08-06 PROCEDURE — 25010000002 ENOXAPARIN PER 10 MG: Performed by: INTERNAL MEDICINE

## 2019-08-06 PROCEDURE — 80048 BASIC METABOLIC PNL TOTAL CA: CPT | Performed by: INTERNAL MEDICINE

## 2019-08-06 PROCEDURE — 93970 EXTREMITY STUDY: CPT | Performed by: SURGERY

## 2019-08-06 PROCEDURE — 99024 POSTOP FOLLOW-UP VISIT: CPT | Performed by: NURSE PRACTITIONER

## 2019-08-06 RX ORDER — BISACODYL 10 MG
10 SUPPOSITORY, RECTAL RECTAL DAILY PRN
Status: DISCONTINUED | OUTPATIENT
Start: 2019-08-06 | End: 2019-08-14 | Stop reason: HOSPADM

## 2019-08-06 RX ADMIN — METOPROLOL TARTRATE 50 MG: 50 TABLET ORAL at 20:36

## 2019-08-06 RX ADMIN — TAMOXIFEN CITRATE 20 MG: 20 TABLET ORAL at 10:09

## 2019-08-06 RX ADMIN — DIGOXIN 125 MCG: 125 TABLET ORAL at 12:13

## 2019-08-06 RX ADMIN — LISINOPRIL 5 MG: 5 TABLET ORAL at 10:09

## 2019-08-06 RX ADMIN — ROSUVASTATIN CALCIUM 20 MG: 20 TABLET, FILM COATED ORAL at 20:36

## 2019-08-06 RX ADMIN — BISACODYL 10 MG: 10 SUPPOSITORY RECTAL at 15:06

## 2019-08-06 RX ADMIN — SODIUM CHLORIDE, PRESERVATIVE FREE 3 ML: 5 INJECTION INTRAVENOUS at 20:37

## 2019-08-06 RX ADMIN — METOLAZONE 2.5 MG: 2.5 TABLET ORAL at 10:09

## 2019-08-06 RX ADMIN — ENOXAPARIN SODIUM 50 MG: 100 INJECTION SUBCUTANEOUS at 12:13

## 2019-08-06 RX ADMIN — HYDROCODONE BITARTRATE AND ACETAMINOPHEN 1 TABLET: 10; 325 TABLET ORAL at 13:45

## 2019-08-06 RX ADMIN — METOPROLOL TARTRATE 50 MG: 50 TABLET ORAL at 10:09

## 2019-08-06 RX ADMIN — HYDROCODONE BITARTRATE AND ACETAMINOPHEN 1 TABLET: 10; 325 TABLET ORAL at 00:08

## 2019-08-06 RX ADMIN — SODIUM CHLORIDE, PRESERVATIVE FREE 3 ML: 5 INJECTION INTRAVENOUS at 10:09

## 2019-08-06 NOTE — PLAN OF CARE
Problem: Patient Care Overview  Goal: Plan of Care Review  Outcome: Ongoing (interventions implemented as appropriate)   08/06/19 1814   Coping/Psychosocial   Plan of Care Reviewed With patient   Plan of Care Review   Progress no change   OTHER   Outcome Summary VSS. C/o right shoulder pain this shift, medicated with PRN Norco with relief obtained. PRN suppository given x1 for c/o constipation. AF , as high as 166 on tele. US doppler bilateral legs showed DVT in bilateral LE. Echo done today. Lovenox started. Safety maintained. Will cont to monitor and call MD with any changes.       Problem: Cardiac: Heart Failure (Adult)  Goal: Signs and Symptoms of Listed Potential Problems Will be Absent, Minimized or Managed (Cardiac: Heart Failure)  Outcome: Ongoing (interventions implemented as appropriate)      Problem: Fall Risk (Adult)  Goal: Identify Related Risk Factors and Signs and Symptoms  Outcome: Ongoing (interventions implemented as appropriate)    Goal: Absence of Fall  Outcome: Ongoing (interventions implemented as appropriate)      Problem: VTE, DVT and PE (Adult)  Goal: Signs and Symptoms of Listed Potential Problems Will be Absent, Minimized or Managed (VTE, DVT and PE)  Outcome: Ongoing (interventions implemented as appropriate)

## 2019-08-06 NOTE — PROGRESS NOTES
LOS: 0 days   Patient Care Team:  RHETT Mcgee MD as PCP - General (Family Medicine)    Chief Complaint:   CHF (congestive heart failure) (CMS/HCC)    Shortness of breath    Subjective    Feeling better  No chest pain  Baseline shortness of breath  No palpitation  Leg swelling marginally improved  Lost 2 pounds  Diuresing well  Labs reviewed  CT angiography of chest positive for bilateral pulmonary embolism with right ventricular strain.    Interval History: Improved overall marginally    Telemetry: no malignant arrhythmia. No significant pauses.    Review of Systems     Constitutional: No chills   Has fatigue   No fever.     HENT: Negative.    Eyes: Negative.      Respiratory: Negative for cough,   No chest wall soreness,   Shortness of breath,   no wheezing, no stridor.      Cardiovascular: Shortness of breath and massive bipedal edema  Gastrointestinal: Negative for abdominal distention,  No abdominal pain,   No blood in stool,   No constipation,   No diarrhea,   No nausea   No vomiting.     Endocrine: Negative.    Genitourinary: Negative for difficulty urinating, dysuria, flank pain and hematuria.     Musculoskeletal: Negative.    Skin: Negative for rash and wound.   Allergic/Immunologic: Negative.      Neurological: Negative for dizziness, syncope, weakness,   No light-headedness  No  headaches.     Hematological: Does not bruise/bleed easily.     Psychiatric/Behavioral: Negative for agitation or behavioral problems,   No confusion,   the patient is  nervous/anxious.       History:   Past Medical History:   Diagnosis Date   • Atrial fibrillation (CMS/HCC)    • Elevated cholesterol    • Hypertension      Past Surgical History:   Procedure Laterality Date   • APPENDECTOMY     • CRANIOTOMY FOR SUBDURAL HEMATOMA Bilateral 6/16/2019    Procedure: CRANIOTOMY FOR SUBDURAL HEMATOMA;  Surgeon: Galen Bhat MD;  Location: [facility];  Service: Neurosurgery   • HEMORROIDECTOMY     • UMBILICAL HERNIA REPAIR        Social History     Socioeconomic History   • Marital status:      Spouse name: Not on file   • Number of children: Not on file   • Years of education: Not on file   • Highest education level: Not on file   Tobacco Use   • Smoking status: Former Smoker   • Smokeless tobacco: Former User     Types: Chew     Quit date: 2009   • Tobacco comment: quit at age 20   Substance and Sexual Activity   • Alcohol use: No     Frequency: Never   • Drug use: No   • Sexual activity: Defer     Family History   Problem Relation Age of Onset   • Cancer Mother    • Cancer Father    • No Known Problems Sister    • No Known Problems Brother    • COPD Brother    • Cancer Brother        Labs:  WBC WBC   Date Value Ref Range Status   08/05/2019 9.53 4.80 - 10.80 10*3/mm3 Final      HGB Hemoglobin   Date Value Ref Range Status   08/05/2019 12.3 (L) 14.0 - 18.0 g/dL Final      HCT Hematocrit   Date Value Ref Range Status   08/05/2019 38.2 (L) 40.0 - 52.0 % Final      Platelets Platelets   Date Value Ref Range Status   08/05/2019 103 (L) 130 - 400 10*3/mm3 Final      MCV MCV   Date Value Ref Range Status   08/05/2019 85.8 82.0 - 95.0 fL Final        Results from last 7 days   Lab Units 08/06/19  0436 08/05/19  1640   SODIUM mmol/L 134* 134*   POTASSIUM mmol/L 4.0 3.8   CHLORIDE mmol/L 93* 96*   CO2 mmol/L 32.0* 29.0   BUN mg/dL 26* 29*   CREATININE mg/dL 0.84 0.94   CALCIUM mg/dL 8.3* 8.4   BILIRUBIN mg/dL  --  0.7   ALK PHOS U/L  --  69   ALT (SGPT) U/L  --  20   AST (SGOT) U/L  --  29   GLUCOSE mg/dL 169* 199*     Lab Results   Component Value Date    TROPONINI <0.012 08/05/2019     PT/INR:    Protime   Date Value Ref Range Status   08/05/2019 22.1 (H) 11.9 - 14.6 Seconds Final   /  INR   Date Value Ref Range Status   08/05/2019 1.86 (H) 0.91 - 1.09 Final       Imaging Results (last 72 hours)     Procedure Component Value Units Date/Time    US Venous Doppler Lower Extremity Bilateral (duplex) [[id number]] Updated:  08/06/19 0946     CT Angiogram Chest With Contrast [[id number]] Collected:  08/06/19 0043     Updated:  08/06/19 0056    Narrative:       EXAM: CT ANGIOGRAM CHEST W CONTRAST- - 8/5/2019 9:38 PM CDT     HISTORY: Dyspnea, cardiac origin suspected. Bilateral lower extremity  edema.     COMPARISON: None.      DOSE LENGTH PRODUCT: 529 mGy cm. Automatic exposure control was utilized  to make radiation dose as low as reasonably achievable.     TECHNIQUE: Enhanced axial CT images obtained with multiplanar reformats.  3D postprocessing, including MIPs, performed and images saved to PACS.     FINDINGS:   AIRWAYS/PULMONARY PARENCHYMA: The central airways are midline and  patent. Bibasilar opacities, mildly nodular at the left lower lobe Small  bilateral pleural effusions. No pneumothorax.     VESSELS: Contrast bolus is adequate. There are numerous bilateral  pulmonary emboli involving segmental and subsegmental pulmonary  arteries. Pulmonary artery is enlarged measuring 3.7 cm. The  intraventricular septum appears flattened, concerning for right heart  strain.      CARDIAC:  Normal heart size.  No pericardial effusion.  Scattered  coronary artery calcifications.     MEDIASTINUM: There is no mediastinal or hilar lymphadenopathy by CT size  criteria. Esophagus has normal coarse, caliber and wall thickness.     EXTRATHORACIC: Calcified thyroid nodule projecting inferior to the  thyroid, difficult to measure due to streak artifact from vascular  contrast. The extrathoracic soft tissues are within normal limits.      INCLUDED UPPER ABDOMEN: The visualized portions of the upper abdomen are  within normal limits. Elevation of the right hemidiaphragm.     OSSEOUS: Diffusely heterogeneous bone mineralization. This could be seen  with osteopenia, but infiltrative process is not excluded.          Impression:       1. Positive for pulmonary emboli. Findings concerning for right heart  strain and pulmonary hypertension.  2. Bibasilar opacities, mildly  nodular the left lower lobe. This could  represent evolving infarct or infection. Recommend follow-up after  treatment to evaluate for resolution.   3. Diffusely heterogeneous bone mineralization, this could be due to  osteopenia, other metabolic process or multiple myeloma. Recommend  correlation with patient history.  4. Calcified thyroid nodule. If not previously performed an outside  facility, dedicated thyroid ultrasound could be considered.  5. Scattered coronary artery calcifications.     Results communicated by telephone to the patient's nurse Saritha Darling  at 12:51 AM on 08/06/2019. She reports that the managing clinician is  aware of the pulmonary emboli and right heart strain.     Preliminary interpretation performed by Elio and I agree with the  preliminary report.     This report was finalized on 08/06/2019 00:53 by Dr Martina Ojeda MD.          Objective     Allergies   Allergen Reactions   • Penicillins Other (See Comments)     Causes mouth to be sore       Medication Review: Performed  Current Facility-Administered Medications   Medication Dose Route Frequency Provider Last Rate Last Dose   • acetaminophen (TYLENOL) tablet 650 mg  650 mg Oral Q4H PRN Jasen Holcomb MD       • digoxin (LANOXIN) tablet 125 mcg  125 mcg Oral Daily Jasen Holcomb MD       • HYDROcodone-acetaminophen (NORCO)  MG per tablet 1 tablet  1 tablet Oral Q6H PRN Jasen Holcomb MD   1 tablet at 08/06/19 0008   • lisinopril (PRINIVIL,ZESTRIL) tablet 5 mg  5 mg Oral Q24H Jasen Holcomb MD   5 mg at 08/06/19 1009   • metOLazone (ZAROXOLYN) tablet 2.5 mg  2.5 mg Oral Daily Jasen Holcomb MD   2.5 mg at 08/06/19 1009   • metoprolol tartrate (LOPRESSOR) tablet 50 mg  50 mg Oral Q12H Jasen Holcomb MD   50 mg at 08/06/19 1009   • rosuvastatin (CRESTOR) tablet 20 mg  20 mg Oral Nightly Jasen Holcomb MD   20 mg at 08/05/19 2017   • sodium chloride 0.9 % flush 3 mL  3 mL Intravenous Q12H Jasen Holcomb MD   3 mL at 08/06/19 1009   •  "sodium chloride 0.9 % flush 3-10 mL  3-10 mL Intravenous PRN Jasen Holcomb MD       • tamoxifen (NOLVADEX) chemo tablet 20 mg  20 mg Oral Daily Jasen Holcomb MD   20 mg at 08/06/19 1009       Vital Sign Min/Max for last 24 hours  Temp  Min: 97.5 °F (36.4 °C)  Max: 98.5 °F (36.9 °C)   BP  Min: 90/54  Max: 116/71   Pulse  Min: 69  Max: 104   Resp  Min: 18  Max: 18   SpO2  Min: 94 %  Max: 97 %   No Data Recorded   Weight  Min: 107 kg (235 lb 9.6 oz)  Max: 107 kg (235 lb 14.3 oz)     Flowsheet Rows      First Filed Value   Admission Height  172.7 cm (68\") Documented at 08/05/2019 1415   Admission Weight  107 kg (235 lb 9.6 oz) Documented at 08/05/2019 1415          Results for orders placed during the hospital encounter of 06/15/19   Adult Transthoracic Echo Complete W/ Cont if Necessary Per Protocol    Narrative · Estimated EF = 65%.  · Left ventricular systolic function is normal.  · There is calcification of the aortic valve.  · Mild to moderate aortic valve stenosis is present.          Physical Exam:    General Appearance: Awake, alert, in no acute distress  Eyes: Pupils equal and reactive    Ears: Appear intact with no abnormalities noted  Nose: Nares normal, no drainage  Neck: supple, trachea midline, no carotid bruit and no JVD  Back: no kyphosis present,    Lungs: respirations regular, respirations even and respirations unlabored    Heart: normal S1, S2,  2/6 pansystolic murmur in the left sternal border,  no rub and no click    Abdomen: normal bowel sounds, no tenderness   Skin: no bleeding, bruising or rash  Extremities: no cyanosis  Massive bipedal edema pitting  Psychiatric/Behavioral: Negative for agitation, behavioral problems, confusion, the patient does  appear to be nervous/anxious.          Results Review:   I reviewed the patient's new clinical results.  I reviewed the patient's new imaging results and agree with the interpretation.  I reviewed the patient's other test results and agree with the " interpretation  I personally viewed and interpreted the patient's EKG/Telemetry data  Discussed with patient    Reviewed available prior notes, consults, prior visits, laboratory findings, radiology and cardiology relevant reports. Updated chart as applicable. I have reviewed the patient's medical history in detail and updated the computerized patient record as relevant.      Updated patient regarding any new or relevant abnormalities on review of records or any new findings on physical exam. Mentioned to patient about purpose of visit and desirable health short and long term goals and objectives.     Assessment/Plan       CHF (congestive heart failure) (CMS/[facility])  Pulmonary embolism  Bilateral lower extremity deep vein thrombosis  Advanced age  Subdural hematoma with possible recent bleeding    Plan      Continue with diuresis to which is responding  Will start on low-dose anticoagulation with Lovenox 0.5 mg/kg subcutaneously every 12 hours  Discussed in detail with Dr. Bhat  Acceptable cardiovascular risk of equals therapy if required  We will check echocardiogram.  We will discuss with family in detail as to what their and patient's wishes are prior to consideration for any lytic therapy.  Overall his prognosis is guarded due to multiple comorbidities as well as her advanced age      Supportive care  Telemetry  Optimal medical therapy    Deep vein thrombosis prophylaxis/precautions  Appropriate diet, fluid, sodium, caffeine, stimulants intake     Questions were encouraged, asked and answered to the patient's  understanding and satisfaction.    Compliance to diet and medications   Avoid NSAIDS    Jasen Holcomb MD  08/06/19  11:37 AM    EMR Dragon/Transcription was used to dictate part of this note

## 2019-08-06 NOTE — PROGRESS NOTES
Discharge Planning Assessment  [facility]     Patient Name: Madi Mancini  MRN: [id number]  Today's Date: 8/6/2019    Admit Date: 8/5/2019    Discharge Needs Assessment     Row Name 08/06/19 1534       Living Environment    Lives With  facility resident [facility]    Current Living Arrangements  extended care facility    Primary Care Provided by  other (see comments) NH STAFF    Provides Primary Care For  no one, unable/limited ability to care for self    Family Caregiver if Needed  child(malu), adult;sibling(s)    Quality of Family Relationships  helpful;supportive    Able to Return to Prior Arrangements  yes       Resource/Environmental Concerns    Resource/Environmental Concerns  none    Transportation Concerns  car, none       Transition Planning    Patient/Family Anticipates Transition to  long term care facility    Patient/Family Anticipated Services at Transition  skilled nursing    Transportation Anticipated  family or friend will provide       Discharge Needs Assessment    Readmission Within the Last 30 Days  no previous admission in last 30 days    Concerns to be Addressed  denies needs/concerns at this time    Equipment Currently Used at Home  none    Equipment Needed After Discharge  none    Discharge Facility/Level of Care Needs  nursing facility, skilled        Discharge Plan     Row Name 08/06/19 1536       Plan    Plan  [facility]; SKILLED LEVEL OF CARE    Patient/Family in Agreement with Plan  yes    Plan Comments  PT. IS A COURTESY BED HOLD AT [facility] AT THE SKILLED LEVEL OF CARE, PER MARCO ANTONIO AT [facility].  WILL FOLLOW TO COORDINATE PT'S RETURN AT D/C.          Destination      No service coordination in this encounter.      Durable Medical Equipment      No service coordination in this encounter.      Dialysis/Infusion      No service coordination in this encounter.      Home Medical Care      No service coordination in this encounter.      Therapy      No service coordination in this encounter.       Community Resources      No service coordination in this encounter.          Demographic Summary    No documentation.       Functional Status    No documentation.       Psychosocial    No documentation.       Abuse/Neglect    No documentation.       Legal    No documentation.       Substance Abuse    No documentation.       Patient Forms    No documentation.           KELSY Mitchell

## 2019-08-06 NOTE — PLAN OF CARE
Problem: Patient Care Overview  Goal: Plan of Care Review  Outcome: Ongoing (interventions implemented as appropriate)      08/06/19 0336   Coping/Psychosocial   Plan of Care Reviewed With patient   Plan of Care Review   Progress no change   OTHER   Outcome Summary pt c/o rt shoulder pain ,up chair sleeeping ,no resp difficulty noted ,voids per urinal ct angiogram report was called to md per radiology        Problem: Cardiac: Heart Failure (Adult)  Goal: Signs and Symptoms of Listed Potential Problems Will be Absent, Minimized or Managed (Cardiac: Heart Failure)  Outcome: Ongoing (interventions implemented as appropriate)      Problem: Fall Risk (Adult)  Goal: Absence of Fall  Outcome: Ongoing (interventions implemented as appropriate)      Problem: VTE, DVT and PE (Adult)  Goal: Signs and Symptoms of Listed Potential Problems Will be Absent, Minimized or Managed (VTE, DVT and PE)  Outcome: Ongoing (interventions implemented as appropriate)

## 2019-08-06 NOTE — CONSULTS
Reason for consult subdural hematoma    No chief complaint on file.        HPI: This is a 79-year-old male gentleman who is known to our services.  He went to the operating room on June 16, 2019 for a bilateral craniotomy for subdural hematoma evacuation.  The patient did initially have some difficulty postoperatively there was a large amount of pneumocephalus present however he has been following up in our clinic as an outpatient and has been making improvement.  He currently is residing in a nursing home.  The patient has had increasing difficulty with shortness of breath and swelling in his lower extremities bilaterally.  He was seen by Dr. Holcomb and felt the patient was in congestive heart failure and was subsequently admitted to the hospital for acute treatment.  CT angiogram of the chest was performed which did reveal pulmonary emboli as well as a right heart strain and pulmonary hypertension.  Bilateral Doppler ultrasound of the lower extremities is pending at this time.  The patient has been off anticoagulation since his craniotomy.  Scan of the head yesterday patient has had improvement although left subdural but the right subdural has not changed and does appear to be with some increased density however it is stable compared to the previous CT scan of the head.  From a functional standpoint the patient has made improvement since being in the hospital from the craniotomy.  He is having difficulty with right arm due to a shoulder issue at this time    Review of Systems   Constitutional: Positive for activity change. Negative for fever.   HENT: Negative.    Eyes: Negative.    Respiratory: Positive for shortness of breath.    Cardiovascular: Positive for leg swelling.   Gastrointestinal: Negative.    Genitourinary: Negative.    Musculoskeletal: Positive for arthralgias.   Allergic/Immunologic: Negative.    Neurological: Positive for weakness.   Psychiatric/Behavioral: Negative.    All other systems reviewed and  "are negative.       Past Medical History:  has a past medical history of Atrial fibrillation (CMS/HCC), Elevated cholesterol, and Hypertension.    Past Surgical History:  has a past surgical history that includes Umbilical hernia repair; Appendectomy; Craniotomy for Subdural Hematoma (Bilateral, 6/16/2019); and Hemorroidectomy.    Family History: family history includes COPD in his brother; Cancer in his brother, father, and mother; No Known Problems in his brother and sister.    Social History:  reports that he has quit smoking. He quit smokeless tobacco use about 10 years ago. His smokeless tobacco use included chew. He reports that he does not drink alcohol or use drugs.    Allergies: Penicillins    Medications: Scheduled Meds:  digoxin 125 mcg Oral Daily   lisinopril 5 mg Oral Q24H   metOLazone 2.5 mg Oral Daily   metoprolol tartrate 50 mg Oral Q12H   rosuvastatin 20 mg Oral Nightly   sodium chloride 3 mL Intravenous Q12H   tamoxifen 20 mg Oral Daily     Continuous Infusions:   PRN Meds:.•  acetaminophen  •  HYDROcodone-acetaminophen  •  sodium chloride     Objective:  General Appearance:  Comfortable, ill-appearing, in no acute distress and not in pain.    Vital signs: (most recent): Blood pressure 100/65, pulse 95, temperature 98.5 °F (36.9 °C), temperature source Oral, resp. rate 18, height 172.7 cm (67.99\"), weight 107 kg (235 lb 14.3 oz), SpO2 95 %.  Vital signs are normal.  No fever.    Output: Producing urine.    HEENT: Normal HEENT exam.    Lungs:  Normal effort and normal respiratory rate.  He is not in respiratory distress.    Heart: Normal rate.  Regular rhythm.    Chest: Symmetric chest wall expansion.   Extremities: Decreased range of motion.  There is dependent edema.    Skin:  Warm and dry.    Abdomen: Abdomen is soft and non-distended.  Bowel sounds are normal.   There is no abdominal tenderness.         Neurologic Exam     Mental Status   Oriented to person, place, and time.   Attention: normal. " Concentration: normal.   Speech: speech is normal   Level of consciousness: alert    Cranial Nerves   Cranial nerves II through XII intact.     CN III, IV, VI   Pupils are equal, round, and reactive to light.  Extraocular motions are normal.     Motor Exam   Muscle bulk: normal    Strength   Strength 5/5 throughout. Difficulty moving the right shoulder     Sensory Exam   Light touch normal.       Vital Signs  Temp:  [97.6 °F (36.4 °C)-98.5 °F (36.9 °C)] 98.5 °F (36.9 °C)  Heart Rate:  [] 95  Resp:  [18] 18  BP: ()/(54-80) 100/65    Physical Exam   Constitutional: He is oriented to person, place, and time. Vital signs are normal. He appears well-developed and well-nourished.   HENT:   Head: Normocephalic.   Eyes: Conjunctivae, EOM and lids are normal. Pupils are equal, round, and reactive to light.   Neck: Normal range of motion.   Cardiovascular: Normal rate and regular rhythm.   Pulmonary/Chest: Effort normal and breath sounds normal. No respiratory distress.   Abdominal: Soft. Normal appearance and bowel sounds are normal. He exhibits no distension.   Musculoskeletal: He exhibits edema.        Right shoulder: He exhibits pain.   Neurological: He is alert and oriented to person, place, and time. He has normal strength and normal reflexes. He displays normal reflexes. No cranial nerve deficit or sensory deficit. GCS eye subscore is 4. GCS verbal subscore is 5. GCS motor subscore is 6.   Skin: Skin is warm and dry.   Psychiatric: He has a normal mood and affect. His speech is normal and behavior is normal. Thought content normal. Cognition and memory are normal.       Results Review:   I reviewed the patient's new clinical results.  I reviewed the patient's new imaging results and agree with the interpretation.  I reviewed the patient's other test results and agree with the interpretation          Lab Results (last 24 hours)     Procedure Component Value Units Date/Time    Basic Metabolic Panel  [[id number]]  (Abnormal) Collected:  08/06/19 0436    Specimen:  Blood Updated:  08/06/19 0526     Glucose 169 mg/dL      BUN 26 mg/dL      Creatinine 0.84 mg/dL      Sodium 134 mmol/L      Potassium 4.0 mmol/L      Chloride 93 mmol/L      CO2 32.0 mmol/L      Calcium 8.3 mg/dL      eGFR Non African Amer 88 mL/min/1.73      BUN/Creatinine Ratio 31.0     Anion Gap 9.0 mmol/L     Narrative:       GFR Normal >60  Chronic Kidney Disease <60  Kidney Failure <15    TSH [[id number]]  (Normal) Collected:  08/05/19 1640    Specimen:  Blood Updated:  08/05/19 1729     TSH 0.807 mIU/mL     Protime-INR [[id number]]  (Abnormal) Collected:  08/05/19 1640    Specimen:  Blood Updated:  08/05/19 1714     Protime 22.1 Seconds      INR 1.86    D-dimer, Quantitative [[id number]]  (Abnormal) Collected:  08/05/19 1640    Specimen:  Blood Updated:  08/05/19 1714     D-Dimer, Quantitative 14.24 mg/L (FEU)     Narrative:       Reference Range is 0-0.50 mg/L FEU. However, results <0.50 mg/L FEU tends to rule out DVT or PE. Results >0.50 mg/L FEU are not useful in predicting absence or presence of DVT or PE.    BNP [[id number]]  (Abnormal) Collected:  08/05/19 1640    Specimen:  Blood Updated:  08/05/19 1710     proBNP 5,730.0 pg/mL     Troponin [[id number]]  (Normal) Collected:  08/05/19 1640    Specimen:  Blood Updated:  08/05/19 1710     Troponin I <0.012 ng/mL     Comprehensive Metabolic Panel [[id number]]  (Abnormal) Collected:  08/05/19 1640    Specimen:  Blood Updated:  08/05/19 1658     Glucose 199 mg/dL      BUN 29 mg/dL      Creatinine 0.94 mg/dL      Sodium 134 mmol/L      Potassium 3.8 mmol/L      Chloride 96 mmol/L      CO2 29.0 mmol/L      Calcium 8.4 mg/dL      Total Protein 6.6 g/dL      Albumin 3.70 g/dL      ALT (SGPT) 20 U/L      AST (SGOT) 29 U/L      Alkaline Phosphatase 69 U/L      Total Bilirubin 0.7 mg/dL      eGFR Non African Amer 77 mL/min/1.73      Globulin 2.9 gm/dL      A/G Ratio 1.3 g/dL      BUN/Creatinine Ratio 30.9      Anion Gap 9.0 mmol/L     Narrative:       GFR Normal >60  Chronic Kidney Disease <60  Kidney Failure <15    CBC & Differential [[id number]] Collected:  08/05/19 1640    Specimen:  Blood Updated:  08/05/19 1657    Narrative:       The following orders were created for panel order CBC & Differential.  Procedure                               Abnormality         Status                     ---------                               -----------         ------                     CBC Auto Differential[[id number]]        Abnormal            Final result                 Please view results for these tests on the individual orders.    CBC Auto Differential [[id number]]  (Abnormal) Collected:  08/05/19 1640    Specimen:  Blood Updated:  08/05/19 1657     WBC 9.53 10*3/mm3      RBC 4.45 10*6/mm3      Hemoglobin 12.3 g/dL      Hematocrit 38.2 %      MCV 85.8 fL      MCH 27.6 pg      MCHC 32.2 g/dL      RDW 14.0 %      RDW-SD 43.8 fl      MPV 10.2 fL      Platelets 103 10*3/mm3      Neutrophil % 69.1 %      Lymphocyte % 19.9 %      Monocyte % 8.5 %      Eosinophil % 1.6 %      Basophil % 0.5 %      Neutrophils, Absolute 6.58 10*3/mm3      Lymphocytes, Absolute 1.90 10*3/mm3      Monocytes, Absolute 0.81 10*3/mm3      Eosinophils, Absolute 0.15 10*3/mm3      Basophils, Absolute 0.05 10*3/mm3           CHF (congestive heart failure) (CMS/[facility])      Assessment/Plan:   This patient was discussed with Dr. Bhat and Dr. Holcomb.  The patient has had stable imaging of the head it does appear that the coronary embolism are important to treat at this time given the patient's clinical condition.  This point it is felt that the patient should be started on Pradaxa and monitored with serial imaging of his head to make sure that there is no changes in subdural hematoma.  Cardiology is going to have a 2D echo done of the heart and further decision will be made regarding his treatment of the PEs.  We will continue to follow along this patient and  discuss further treatment options with the family.  Thank you for the opportunity to participate in this patient's plan of care    I discussed the patients findings and my recommendations with patient and consulting provider    ALONDRA Richardson  08/06/19  8:24 AM    Time: More than 50% of time spent in counseling and coordination of care:  Total face-to-face/floor time 45 min.  Time spent in counseling 15 min. Counseling included the following topics: Diagnosis and plan of care

## 2019-08-07 ENCOUNTER — APPOINTMENT (OUTPATIENT)
Dept: CT IMAGING | Facility: HOSPITAL | Age: 79
End: 2019-08-07

## 2019-08-07 LAB
ANION GAP SERPL CALCULATED.3IONS-SCNC: 8 MMOL/L (ref 4–13)
BH CV ECHO MEAS - AO MAX PG (FULL): 108.7 MMHG
BH CV ECHO MEAS - AO MAX PG: 112.4 MMHG
BH CV ECHO MEAS - AO MEAN PG (FULL): 60 MMHG
BH CV ECHO MEAS - AO MEAN PG: 62 MMHG
BH CV ECHO MEAS - AO ROOT AREA (BSA CORRECTED): 1.8
BH CV ECHO MEAS - AO ROOT AREA: 12.6 CM^2
BH CV ECHO MEAS - AO ROOT DIAM: 4 CM
BH CV ECHO MEAS - AO V2 MAX: 530 CM/SEC
BH CV ECHO MEAS - AO V2 MEAN: 362.3 CM/SEC
BH CV ECHO MEAS - AO V2 VTI: 98.2 CM
BH CV ECHO MEAS - AVA(I,A): 0.76 CM^2
BH CV ECHO MEAS - AVA(I,D): 0.76 CM^2
BH CV ECHO MEAS - AVA(V,A): 0.63 CM^2
BH CV ECHO MEAS - AVA(V,D): 0.63 CM^2
BH CV ECHO MEAS - BSA(HAYCOCK): 2.3 M^2
BH CV ECHO MEAS - BSA: 2.2 M^2
BH CV ECHO MEAS - BZI_BMI: 35.7 KILOGRAMS/M^2
BH CV ECHO MEAS - BZI_METRIC_HEIGHT: 172.7 CM
BH CV ECHO MEAS - BZI_METRIC_WEIGHT: 106.6 KG
BH CV ECHO MEAS - EDV(CUBED): 37.3 ML
BH CV ECHO MEAS - EDV(MOD-SP4): 68.4 ML
BH CV ECHO MEAS - EDV(TEICH): 45.4 ML
BH CV ECHO MEAS - EF(CUBED): 84.1 %
BH CV ECHO MEAS - EF(MOD-SP4): 60.2 %
BH CV ECHO MEAS - EF(TEICH): 78.3 %
BH CV ECHO MEAS - ESV(CUBED): 5.9 ML
BH CV ECHO MEAS - ESV(MOD-SP4): 27.2 ML
BH CV ECHO MEAS - ESV(TEICH): 9.9 ML
BH CV ECHO MEAS - FS: 45.8 %
BH CV ECHO MEAS - IVS/LVPW: 0.97
BH CV ECHO MEAS - IVSD: 1.1 CM
BH CV ECHO MEAS - LA DIMENSION: 4.5 CM
BH CV ECHO MEAS - LA/AO: 1.1
BH CV ECHO MEAS - LAT PEAK E' VEL: 10.3 CM/SEC
BH CV ECHO MEAS - LV DIASTOLIC VOL/BSA (35-75): 31.2 ML/M^2
BH CV ECHO MEAS - LV MASS(C)D: 110.3 GRAMS
BH CV ECHO MEAS - LV MASS(C)DI: 50.4 GRAMS/M^2
BH CV ECHO MEAS - LV MAX PG: 3.7 MMHG
BH CV ECHO MEAS - LV MEAN PG: 2 MMHG
BH CV ECHO MEAS - LV SYSTOLIC VOL/BSA (12-30): 12.4 ML/M^2
BH CV ECHO MEAS - LV V1 MAX: 96.1 CM/SEC
BH CV ECHO MEAS - LV V1 MEAN: 68.7 CM/SEC
BH CV ECHO MEAS - LV V1 VTI: 21.6 CM
BH CV ECHO MEAS - LVIDD: 3.3 CM
BH CV ECHO MEAS - LVIDS: 1.8 CM
BH CV ECHO MEAS - LVLD AP4: 8 CM
BH CV ECHO MEAS - LVLS AP4: 6.8 CM
BH CV ECHO MEAS - LVOT AREA (M): 3.5 CM^2
BH CV ECHO MEAS - LVOT AREA: 3.5 CM^2
BH CV ECHO MEAS - LVOT DIAM: 2.1 CM
BH CV ECHO MEAS - LVPWD: 1.1 CM
BH CV ECHO MEAS - MED PEAK E' VEL: 7.4 CM/SEC
BH CV ECHO MEAS - MV DEC TIME: 0.2 SEC
BH CV ECHO MEAS - MV E MAX VEL: 119 CM/SEC
BH CV ECHO MEAS - RAP SYSTOLE: 5 MMHG
BH CV ECHO MEAS - RVDD: 3.5 CM
BH CV ECHO MEAS - RVSP: 43.2 MMHG
BH CV ECHO MEAS - SI(AO): 563.6 ML/M^2
BH CV ECHO MEAS - SI(CUBED): 14.3 ML/M^2
BH CV ECHO MEAS - SI(LVOT): 34.2 ML/M^2
BH CV ECHO MEAS - SI(MOD-SP4): 18.8 ML/M^2
BH CV ECHO MEAS - SI(TEICH): 16.3 ML/M^2
BH CV ECHO MEAS - SV(AO): 1234 ML
BH CV ECHO MEAS - SV(CUBED): 31.3 ML
BH CV ECHO MEAS - SV(LVOT): 74.8 ML
BH CV ECHO MEAS - SV(MOD-SP4): 41.2 ML
BH CV ECHO MEAS - SV(TEICH): 35.6 ML
BH CV ECHO MEAS - TR MAX VEL: 309 CM/SEC
BH CV ECHO MEASUREMENTS AVERAGE E/E' RATIO: 13.45
BUN BLD-MCNC: 30 MG/DL (ref 5–21)
BUN/CREAT SERPL: 33 (ref 7–25)
CALCIUM SPEC-SCNC: 8.3 MG/DL (ref 8.4–10.4)
CHLORIDE SERPL-SCNC: 91 MMOL/L (ref 98–110)
CO2 SERPL-SCNC: 32 MMOL/L (ref 24–31)
CREAT BLD-MCNC: 0.91 MG/DL (ref 0.5–1.4)
GFR SERPL CREATININE-BSD FRML MDRD: 80 ML/MIN/1.73
GLUCOSE BLD-MCNC: 117 MG/DL (ref 70–100)
LEFT ATRIUM VOLUME INDEX: 33 ML/M2
LEFT ATRIUM VOLUME: 72.2 CM3
LV EF 2D ECHO EST: 65 %
POTASSIUM BLD-SCNC: 3.7 MMOL/L (ref 3.5–5.3)
SODIUM BLD-SCNC: 131 MMOL/L (ref 135–145)

## 2019-08-07 PROCEDURE — 70450 CT HEAD/BRAIN W/O DYE: CPT

## 2019-08-07 PROCEDURE — 94760 N-INVAS EAR/PLS OXIMETRY 1: CPT

## 2019-08-07 PROCEDURE — 99024 POSTOP FOLLOW-UP VISIT: CPT | Performed by: NURSE PRACTITIONER

## 2019-08-07 PROCEDURE — 99233 SBSQ HOSP IP/OBS HIGH 50: CPT | Performed by: INTERNAL MEDICINE

## 2019-08-07 PROCEDURE — 94799 UNLISTED PULMONARY SVC/PX: CPT

## 2019-08-07 PROCEDURE — 25010000002 ENOXAPARIN PER 10 MG: Performed by: INTERNAL MEDICINE

## 2019-08-07 PROCEDURE — 80048 BASIC METABOLIC PNL TOTAL CA: CPT | Performed by: INTERNAL MEDICINE

## 2019-08-07 RX ORDER — ACETAMINOPHEN 325 MG/1
650 TABLET ORAL EVERY 4 HOURS PRN
COMMUNITY

## 2019-08-07 RX ORDER — CHOLECALCIFEROL (VITAMIN D3) 125 MCG
10 CAPSULE ORAL NIGHTLY
COMMUNITY

## 2019-08-07 RX ORDER — SENNOSIDES 8.6 MG
1 TABLET ORAL DAILY
COMMUNITY

## 2019-08-07 RX ORDER — FUROSEMIDE 40 MG/1
40 TABLET ORAL 2 TIMES DAILY
COMMUNITY

## 2019-08-07 RX ORDER — POLYETHYLENE GLYCOL 3350 17 G/17G
17 POWDER, FOR SOLUTION ORAL 2 TIMES DAILY PRN
COMMUNITY

## 2019-08-07 RX ORDER — LANOLIN ALCOHOL/MO/W.PET/CERES
10 CREAM (GRAM) TOPICAL NIGHTLY PRN
Status: DISPENSED | OUTPATIENT
Start: 2019-08-07 | End: 2019-08-12

## 2019-08-07 RX ORDER — ERGOCALCIFEROL 1.25 MG/1
50000 CAPSULE ORAL WEEKLY
COMMUNITY

## 2019-08-07 RX ADMIN — ENOXAPARIN SODIUM 80 MG: 80 INJECTION SUBCUTANEOUS at 17:38

## 2019-08-07 RX ADMIN — ROSUVASTATIN CALCIUM 20 MG: 20 TABLET, FILM COATED ORAL at 20:55

## 2019-08-07 RX ADMIN — HYDROCODONE BITARTRATE AND ACETAMINOPHEN 1 TABLET: 10; 325 TABLET ORAL at 09:45

## 2019-08-07 RX ADMIN — HYDROCODONE BITARTRATE AND ACETAMINOPHEN 1 TABLET: 10; 325 TABLET ORAL at 21:27

## 2019-08-07 RX ADMIN — ENOXAPARIN SODIUM 50 MG: 100 INJECTION SUBCUTANEOUS at 04:44

## 2019-08-07 RX ADMIN — METOLAZONE 2.5 MG: 2.5 TABLET ORAL at 09:42

## 2019-08-07 RX ADMIN — LISINOPRIL 5 MG: 5 TABLET ORAL at 08:14

## 2019-08-07 RX ADMIN — DIGOXIN 125 MCG: 125 TABLET ORAL at 11:12

## 2019-08-07 RX ADMIN — TAMOXIFEN CITRATE 20 MG: 20 TABLET ORAL at 08:14

## 2019-08-07 RX ADMIN — SODIUM CHLORIDE, PRESERVATIVE FREE 3 ML: 5 INJECTION INTRAVENOUS at 08:14

## 2019-08-07 RX ADMIN — HYDROCODONE BITARTRATE AND ACETAMINOPHEN 1 TABLET: 10; 325 TABLET ORAL at 00:44

## 2019-08-07 NOTE — PLAN OF CARE
Problem: Patient Care Overview  Goal: Plan of Care Review  Outcome: Ongoing (interventions implemented as appropriate)   08/07/19 1702   Coping/Psychosocial   Plan of Care Reviewed With patient   Plan of Care Review   Progress improving   OTHER   Outcome Summary VSS. C/o pain x1, meds given per MAR. Lovenox increased from 50 to 80. Limited echo today. Continues IV diuretics. Lopressor held this AM for low BP. Continue to monitor.       Problem: Cardiac: Heart Failure (Adult)  Goal: Signs and Symptoms of Listed Potential Problems Will be Absent, Minimized or Managed (Cardiac: Heart Failure)  Outcome: Ongoing (interventions implemented as appropriate)   08/07/19 1702   Goal/Outcome Evaluation   Problems Assessed (Heart Failure) all   Problems Present (Heart Failure) decreased quality of life;functional decline/self-care deficit;situational response       Problem: Fall Risk (Adult)  Goal: Identify Related Risk Factors and Signs and Symptoms  Outcome: Ongoing (interventions implemented as appropriate)   08/07/19 1702   Fall Risk (Adult)   Related Risk Factors (Fall Risk) age-related changes;gait/mobility problems;neuro disease/injury;fear of falling;environment unfamiliar   Signs and Symptoms (Fall Risk) presence of risk factors     Goal: Absence of Fall  Outcome: Ongoing (interventions implemented as appropriate)   08/07/19 1702   Fall Risk (Adult)   Absence of Fall achieves outcome       Problem: VTE, DVT and PE (Adult)  Goal: Signs and Symptoms of Listed Potential Problems Will be Absent, Minimized or Managed (VTE, DVT and PE)  Outcome: Ongoing (interventions implemented as appropriate)   08/07/19 1702   Goal/Outcome Evaluation   Problems Assessed (VTE, DVT, PE) all   Problems Present (VTE, DVT, PE) pulmonary embolism;deep vein thrombosis

## 2019-08-07 NOTE — PLAN OF CARE
Problem: Patient Care Overview  Goal: Plan of Care Review  Outcome: Ongoing (interventions implemented as appropriate)   08/07/19 0338   Coping/Psychosocial   Plan of Care Reviewed With patient   Plan of Care Review   Progress improving   OTHER   Outcome Summary P t taking less pain meds. o2 at 2l/m pt had large bm . cont with edema in legs cont to monitor .        Problem: Cardiac: Heart Failure (Adult)  Goal: Signs and Symptoms of Listed Potential Problems Will be Absent, Minimized or Managed (Cardiac: Heart Failure)  Outcome: Ongoing (interventions implemented as appropriate)      Problem: Fall Risk (Adult)  Goal: Absence of Fall  Outcome: Ongoing (interventions implemented as appropriate)      Problem: VTE, DVT and PE (Adult)  Goal: Signs and Symptoms of Listed Potential Problems Will be Absent, Minimized or Managed (VTE, DVT and PE)  Outcome: Ongoing (interventions implemented as appropriate)

## 2019-08-08 ENCOUNTER — APPOINTMENT (OUTPATIENT)
Dept: CARDIOLOGY | Facility: HOSPITAL | Age: 79
End: 2019-08-08

## 2019-08-08 LAB
ANION GAP SERPL CALCULATED.3IONS-SCNC: 10 MMOL/L (ref 4–13)
BH CV ECHO MEAS - AO MAX PG (FULL): 78 MMHG
BH CV ECHO MEAS - AO MAX PG: 81.7 MMHG
BH CV ECHO MEAS - AO MEAN PG (FULL): 40 MMHG
BH CV ECHO MEAS - AO MEAN PG: 42 MMHG
BH CV ECHO MEAS - AO ROOT AREA (BSA CORRECTED): 1.5
BH CV ECHO MEAS - AO ROOT AREA: 8.6 CM^2
BH CV ECHO MEAS - AO ROOT DIAM: 3.3 CM
BH CV ECHO MEAS - AO V2 MAX: 452 CM/SEC
BH CV ECHO MEAS - AO V2 MEAN: 300.4 CM/SEC
BH CV ECHO MEAS - AO V2 VTI: 85.5 CM
BH CV ECHO MEAS - AVA(I,A): 0.48 CM^2
BH CV ECHO MEAS - AVA(I,D): 0.48 CM^2
BH CV ECHO MEAS - AVA(V,A): 0.48 CM^2
BH CV ECHO MEAS - AVA(V,D): 0.48 CM^2
BH CV ECHO MEAS - BSA(HAYCOCK): 2.3 M^2
BH CV ECHO MEAS - BSA: 2.2 M^2
BH CV ECHO MEAS - BZI_BMI: 36.3 KILOGRAMS/M^2
BH CV ECHO MEAS - BZI_METRIC_HEIGHT: 172.7 CM
BH CV ECHO MEAS - BZI_METRIC_WEIGHT: 108.4 KG
BH CV ECHO MEAS - EDV(CUBED): 29.8 ML
BH CV ECHO MEAS - EDV(MOD-SP4): 96.3 ML
BH CV ECHO MEAS - EDV(TEICH): 37.9 ML
BH CV ECHO MEAS - EF(CUBED): 52.4 %
BH CV ECHO MEAS - EF(MOD-SP4): 54.9 %
BH CV ECHO MEAS - EF(TEICH): 45.7 %
BH CV ECHO MEAS - ESV(CUBED): 14.2 ML
BH CV ECHO MEAS - ESV(MOD-SP4): 43.4 ML
BH CV ECHO MEAS - ESV(TEICH): 20.6 ML
BH CV ECHO MEAS - FS: 21.9 %
BH CV ECHO MEAS - IVS/LVPW: 1.4
BH CV ECHO MEAS - IVSD: 1.7 CM
BH CV ECHO MEAS - LA DIMENSION: 4.8 CM
BH CV ECHO MEAS - LA/AO: 1.5
BH CV ECHO MEAS - LAT PEAK E' VEL: 8.8 CM/SEC
BH CV ECHO MEAS - LV DIASTOLIC VOL/BSA (35-75): 43.7 ML/M^2
BH CV ECHO MEAS - LV MASS(C)D: 156.4 GRAMS
BH CV ECHO MEAS - LV MASS(C)DI: 71 GRAMS/M^2
BH CV ECHO MEAS - LV MAX PG: 3.7 MMHG
BH CV ECHO MEAS - LV MEAN PG: 2 MMHG
BH CV ECHO MEAS - LV SYSTOLIC VOL/BSA (12-30): 19.7 ML/M^2
BH CV ECHO MEAS - LV V1 MAX: 96.1 CM/SEC
BH CV ECHO MEAS - LV V1 MEAN: 63.1 CM/SEC
BH CV ECHO MEAS - LV V1 VTI: 18.2 CM
BH CV ECHO MEAS - LVIDD: 3.1 CM
BH CV ECHO MEAS - LVIDS: 2.4 CM
BH CV ECHO MEAS - LVLD AP4: 8.2 CM
BH CV ECHO MEAS - LVLS AP4: 7.3 CM
BH CV ECHO MEAS - LVOT AREA (M): 2.3 CM^2
BH CV ECHO MEAS - LVOT AREA: 2.3 CM^2
BH CV ECHO MEAS - LVOT DIAM: 1.7 CM
BH CV ECHO MEAS - LVPWD: 1.2 CM
BH CV ECHO MEAS - MED PEAK E' VEL: 7.35 CM/SEC
BH CV ECHO MEAS - MV DEC TIME: 0.18 SEC
BH CV ECHO MEAS - MV E MAX VEL: 108 CM/SEC
BH CV ECHO MEAS - SI(AO): 331.7 ML/M^2
BH CV ECHO MEAS - SI(CUBED): 7.1 ML/M^2
BH CV ECHO MEAS - SI(LVOT): 18.7 ML/M^2
BH CV ECHO MEAS - SI(MOD-SP4): 24 ML/M^2
BH CV ECHO MEAS - SI(TEICH): 7.9 ML/M^2
BH CV ECHO MEAS - SV(AO): 731.3 ML
BH CV ECHO MEAS - SV(CUBED): 15.6 ML
BH CV ECHO MEAS - SV(LVOT): 41.3 ML
BH CV ECHO MEAS - SV(MOD-SP4): 52.9 ML
BH CV ECHO MEAS - SV(TEICH): 17.3 ML
BH CV ECHO MEAS - TR MAX VEL: 309 CM/SEC
BH CV ECHO MEASUREMENTS AVERAGE E/E' RATIO: 13.37
BUN BLD-MCNC: 27 MG/DL (ref 5–21)
BUN/CREAT SERPL: 33.3 (ref 7–25)
CALCIUM SPEC-SCNC: 8.2 MG/DL (ref 8.4–10.4)
CHLORIDE SERPL-SCNC: 90 MMOL/L (ref 98–110)
CO2 SERPL-SCNC: 29 MMOL/L (ref 24–31)
CREAT BLD-MCNC: 0.81 MG/DL (ref 0.5–1.4)
GFR SERPL CREATININE-BSD FRML MDRD: 92 ML/MIN/1.73
GLUCOSE BLD-MCNC: 119 MG/DL (ref 70–100)
LEFT ATRIUM VOLUME INDEX: 29.7 ML/M2
LEFT ATRIUM VOLUME: 65.3 CM3
LV EF 2D ECHO EST: 65 %
POTASSIUM BLD-SCNC: 3.8 MMOL/L (ref 3.5–5.3)
SODIUM BLD-SCNC: 129 MMOL/L (ref 135–145)

## 2019-08-08 PROCEDURE — 80048 BASIC METABOLIC PNL TOTAL CA: CPT | Performed by: INTERNAL MEDICINE

## 2019-08-08 PROCEDURE — 99024 POSTOP FOLLOW-UP VISIT: CPT | Performed by: NEUROLOGICAL SURGERY

## 2019-08-08 PROCEDURE — 25010000002 PERFLUTREN 6.52 MG/ML SUSPENSION: Performed by: INTERNAL MEDICINE

## 2019-08-08 PROCEDURE — 94760 N-INVAS EAR/PLS OXIMETRY 1: CPT

## 2019-08-08 PROCEDURE — 25010000002 ENOXAPARIN PER 10 MG: Performed by: INTERNAL MEDICINE

## 2019-08-08 PROCEDURE — 93325 DOPPLER ECHO COLOR FLOW MAPG: CPT

## 2019-08-08 PROCEDURE — 93308 TTE F-UP OR LMTD: CPT

## 2019-08-08 PROCEDURE — 93325 DOPPLER ECHO COLOR FLOW MAPG: CPT | Performed by: INTERNAL MEDICINE

## 2019-08-08 PROCEDURE — 93308 TTE F-UP OR LMTD: CPT | Performed by: INTERNAL MEDICINE

## 2019-08-08 PROCEDURE — 93321 DOPPLER ECHO F-UP/LMTD STD: CPT

## 2019-08-08 PROCEDURE — 93321 DOPPLER ECHO F-UP/LMTD STD: CPT | Performed by: INTERNAL MEDICINE

## 2019-08-08 PROCEDURE — 99233 SBSQ HOSP IP/OBS HIGH 50: CPT | Performed by: INTERNAL MEDICINE

## 2019-08-08 PROCEDURE — 94799 UNLISTED PULMONARY SVC/PX: CPT

## 2019-08-08 RX ADMIN — METOPROLOL TARTRATE 50 MG: 50 TABLET ORAL at 08:49

## 2019-08-08 RX ADMIN — ENOXAPARIN SODIUM 80 MG: 80 INJECTION SUBCUTANEOUS at 16:38

## 2019-08-08 RX ADMIN — SODIUM CHLORIDE, PRESERVATIVE FREE 3 ML: 5 INJECTION INTRAVENOUS at 21:41

## 2019-08-08 RX ADMIN — HYDROCODONE BITARTRATE AND ACETAMINOPHEN 1 TABLET: 10; 325 TABLET ORAL at 21:37

## 2019-08-08 RX ADMIN — TAMOXIFEN CITRATE 20 MG: 20 TABLET ORAL at 08:49

## 2019-08-08 RX ADMIN — Medication 9 MG: at 21:38

## 2019-08-08 RX ADMIN — METOLAZONE 2.5 MG: 2.5 TABLET ORAL at 08:49

## 2019-08-08 RX ADMIN — ROSUVASTATIN CALCIUM 20 MG: 20 TABLET, FILM COATED ORAL at 21:40

## 2019-08-08 RX ADMIN — Medication 9 MG: at 00:00

## 2019-08-08 RX ADMIN — SODIUM CHLORIDE, PRESERVATIVE FREE 3 ML: 5 INJECTION INTRAVENOUS at 08:50

## 2019-08-08 RX ADMIN — HYDROCODONE BITARTRATE AND ACETAMINOPHEN 1 TABLET: 10; 325 TABLET ORAL at 14:56

## 2019-08-08 RX ADMIN — METOPROLOL TARTRATE 50 MG: 50 TABLET ORAL at 21:40

## 2019-08-08 RX ADMIN — HYDROCODONE BITARTRATE AND ACETAMINOPHEN 1 TABLET: 10; 325 TABLET ORAL at 06:21

## 2019-08-08 RX ADMIN — LISINOPRIL 5 MG: 5 TABLET ORAL at 08:49

## 2019-08-08 RX ADMIN — ENOXAPARIN SODIUM 80 MG: 80 INJECTION SUBCUTANEOUS at 05:50

## 2019-08-08 RX ADMIN — BISACODYL 10 MG: 10 SUPPOSITORY RECTAL at 06:11

## 2019-08-08 RX ADMIN — PERFLUTREN: 6.52 INJECTION, SUSPENSION INTRAVENOUS at 10:32

## 2019-08-08 RX ADMIN — DIGOXIN 125 MCG: 125 TABLET ORAL at 11:18

## 2019-08-08 NOTE — PLAN OF CARE
Problem: Patient Care Overview  Goal: Plan of Care Review  Outcome: Ongoing (interventions implemented as appropriate)   08/08/19 1340   Coping/Psychosocial   Plan of Care Reviewed With patient   Plan of Care Review   Progress improving   OTHER   Outcome Summary Pt A&O. O2 on. Denies and SOB or chest pain. No neuro changes or any numbness or tingling. BM today. Bilat lower extremity edema improving. VSS. Safety maintained.

## 2019-08-08 NOTE — PROGRESS NOTES
Continued Stay Note   [place]     Patient Name: Madi Mancini  MRN: [id number]  Today's Date: 8/8/2019    Admit Date: 8/5/2019    Discharge Plan     Row Name 08/08/19 1206       Plan    Plan  [facility]    Patient/Family in Agreement with Plan  yes    Plan Comments  Pt states he is not going to stay with his daughter.  He plans to return to [facility].  BREANNA Boswell.        Discharge Codes    No documentation.             BREANNA Sargent

## 2019-08-08 NOTE — PLAN OF CARE
Problem: Patient Care Overview  Goal: Plan of Care Review  Outcome: Ongoing (interventions implemented as appropriate)   08/08/19 0415   Coping/Psychosocial   Plan of Care Reviewed With patient   Plan of Care Review   Progress improving   OTHER   Outcome Summary Rested most of night, VSS, BP improved, Echo in th AM. Tele reads SB/S 54/61 with BBB f pvcs. and MF Pvcs. Up to wheelcahair wt miminal assist, voiding per urinal. will co   08/08/19 0415   Coping/Psychosocial   Plan of Care Reviewed With patient   Plan of Care Review   Progress improving   OTHER   Outcome Summary Rested most of night, VSS, BP improved, Echo in th AM. Tele reads SB/S 54/61 with BBB f pvcs. and MF Pvcs. Up to wheelcahair wth miminal assist, voiding per urinal. will cont to monitor.   nt to monitor.

## 2019-08-08 NOTE — PROGRESS NOTES
LOS: 2 days   Patient Care Team:  RHETT Mcgee MD as PCP - General (Family Medicine)    Chief Complaint:   CHF (congestive heart failure) (CMS/HCC)    Congestive cardiac failure (CMS/HCC)    Shortness of breath    Subjective    Feeling better  No chest pain  Baseline shortness of breath  No palpitation  Some decrease in leg swelling  No orthopnea  No paroxysmal nocturnal dyspnea  No other new events.  Last night I talked to his daughter on the phone    Interval History: Improved overall marginally    Telemetry: no malignant arrhythmia. No significant pauses.    Review of Systems     Constitutional: No chills   Has fatigue   No fever.     HENT: Negative.    Eyes: Negative.      Respiratory: Negative for cough,   No chest wall soreness,   Shortness of breath,   no wheezing, no stridor.      Cardiovascular: Shortness of breath and massive bipedal edema  Gastrointestinal: Negative for abdominal distention,  No abdominal pain,   No blood in stool,   No constipation,   No diarrhea,   No nausea   No vomiting.     Endocrine: Negative.    Genitourinary: Negative for difficulty urinating, dysuria, flank pain and hematuria.     Musculoskeletal: Negative.    Skin: Negative for rash and wound.   Allergic/Immunologic: Negative.      Neurological: Negative for dizziness, syncope, weakness,   No light-headedness  No  headaches.     Hematological: Does not bruise/bleed easily.     Psychiatric/Behavioral: Negative for agitation or behavioral problems,   No confusion,   the patient is  nervous/anxious.       History:   Past Medical History:   Diagnosis Date   • Atrial fibrillation (CMS/HCC)    • Elevated cholesterol    • Hypertension      Past Surgical History:   Procedure Laterality Date   • APPENDECTOMY     • CRANIOTOMY FOR SUBDURAL HEMATOMA Bilateral 6/16/2019    Procedure: CRANIOTOMY FOR SUBDURAL HEMATOMA;  Surgeon: Galen Bhat MD;  Location: [facility] OR;  Service: Neurosurgery   • HEMORROIDECTOMY     • UMBILICAL  HERNIA REPAIR       Social History     Socioeconomic History   • Marital status:      Spouse name: Not on file   • Number of children: Not on file   • Years of education: Not on file   • Highest education level: Not on file   Tobacco Use   • Smoking status: Former Smoker   • Smokeless tobacco: Former User     Types: Chew     Quit date: 2009   • Tobacco comment: quit at age 20   Substance and Sexual Activity   • Alcohol use: No     Frequency: Never   • Drug use: No   • Sexual activity: Defer     Family History   Problem Relation Age of Onset   • Cancer Mother    • Cancer Father    • No Known Problems Sister    • No Known Problems Brother    • COPD Brother    • Cancer Brother        Labs:  WBC No results found for: WBC   HGB No results found for: HGB   HCT No results found for: HCT   Platelets No results found for: PLT   MCV No results found for: MCV     Results from last 7 days   Lab Units 08/08/19  0544 08/07/19  0444 08/06/19  0436 08/05/19  1640   SODIUM mmol/L 129* 131* 134* 134*   POTASSIUM mmol/L 3.8 3.7 4.0 3.8   CHLORIDE mmol/L 90* 91* 93* 96*   CO2 mmol/L 29.0 32.0* 32.0* 29.0   BUN mg/dL 27* 30* 26* 29*   CREATININE mg/dL 0.81 0.91 0.84 0.94   CALCIUM mg/dL 8.2* 8.3* 8.3* 8.4   BILIRUBIN mg/dL  --   --   --  0.7   ALK PHOS U/L  --   --   --  69   ALT (SGPT) U/L  --   --   --  20   AST (SGOT) U/L  --   --   --  29   GLUCOSE mg/dL 119* 117* 169* 199*     Lab Results   Component Value Date    TROPONINI <0.012 08/05/2019     PT/INR:    No results found for: PROTIME/  No results found for: INR    Imaging Results (last 72 hours)     Procedure Component Value Units Date/Time    US Venous Doppler Lower Extremity Bilateral (duplex) [[id number]] Updated:  08/06/19 0946    CT Angiogram Chest With Contrast [[id number]] Collected:  08/06/19 0043     Updated:  08/06/19 0056    Narrative:       EXAM: CT ANGIOGRAM CHEST W CONTRAST- - 8/5/2019 9:38 PM CDT     HISTORY: Dyspnea, cardiac origin suspected. Bilateral lower  extremity  edema.     COMPARISON: None.      DOSE LENGTH PRODUCT: 529 mGy cm. Automatic exposure control was utilized  to make radiation dose as low as reasonably achievable.     TECHNIQUE: Enhanced axial CT images obtained with multiplanar reformats.  3D postprocessing, including MIPs, performed and images saved to PACS.     FINDINGS:   AIRWAYS/PULMONARY PARENCHYMA: The central airways are midline and  patent. Bibasilar opacities, mildly nodular at the left lower lobe Small  bilateral pleural effusions. No pneumothorax.     VESSELS: Contrast bolus is adequate. There are numerous bilateral  pulmonary emboli involving segmental and subsegmental pulmonary  arteries. Pulmonary artery is enlarged measuring 3.7 cm. The  intraventricular septum appears flattened, concerning for right heart  strain.      CARDIAC:  Normal heart size.  No pericardial effusion.  Scattered  coronary artery calcifications.     MEDIASTINUM: There is no mediastinal or hilar lymphadenopathy by CT size  criteria. Esophagus has normal coarse, caliber and wall thickness.     EXTRATHORACIC: Calcified thyroid nodule projecting inferior to the  thyroid, difficult to measure due to streak artifact from vascular  contrast. The extrathoracic soft tissues are within normal limits.      INCLUDED UPPER ABDOMEN: The visualized portions of the upper abdomen are  within normal limits. Elevation of the right hemidiaphragm.     OSSEOUS: Diffusely heterogeneous bone mineralization. This could be seen  with osteopenia, but infiltrative process is not excluded.          Impression:       1. Positive for pulmonary emboli. Findings concerning for right heart  strain and pulmonary hypertension.  2. Bibasilar opacities, mildly nodular the left lower lobe. This could  represent evolving infarct or infection. Recommend follow-up after  treatment to evaluate for resolution.   3. Diffusely heterogeneous bone mineralization, this could be due to  osteopenia, other metabolic  process or multiple myeloma. Recommend  correlation with patient history.  4. Calcified thyroid nodule. If not previously performed an outside  facility, dedicated thyroid ultrasound could be considered.  5. Scattered coronary artery calcifications.     Results communicated by telephone to the patient's nurse Saritha Lisset  at 12:51 AM on 08/06/2019. She reports that the managing clinician is  aware of the pulmonary emboli and right heart strain.     Preliminary interpretation performed by Elio and I agree with the  preliminary report.     This report was finalized on 08/06/2019 00:53 by Dr Martina Ojeda MD.          Objective     Allergies   Allergen Reactions   • Penicillins Other (See Comments)     Causes mouth to be sore       Medication Review: Performed  Current Facility-Administered Medications   Medication Dose Route Frequency Provider Last Rate Last Dose   • acetaminophen (TYLENOL) tablet 650 mg  650 mg Oral Q4H PRN Jasen Holcomb MD       • bisacodyl (DULCOLAX) suppository 10 mg  10 mg Rectal Daily PRN Jasen Holcomb MD   10 mg at 08/08/19 0611   • digoxin (LANOXIN) tablet 125 mcg  125 mcg Oral Daily Jasen Holcomb MD   125 mcg at 08/08/19 1118   • enoxaparin (LOVENOX) syringe 80 mg  0.75 mg/kg Subcutaneous Q12H Jasen Holcomb MD   80 mg at 08/08/19 1638   • HYDROcodone-acetaminophen (NORCO)  MG per tablet 1 tablet  1 tablet Oral Q6H PRN Jasen Holcomb MD   1 tablet at 08/08/19 1456   • lisinopril (PRINIVIL,ZESTRIL) tablet 5 mg  5 mg Oral Q24H Jasen Holcomb MD   5 mg at 08/08/19 0849   • melatonin tablet 9.75 mg  9.75 mg Oral Nightly PRN Shaheen Aj MD   9 mg at 08/08/19 0000   • metOLazone (ZAROXOLYN) tablet 2.5 mg  2.5 mg Oral Daily Jasen Holcomb MD   2.5 mg at 08/08/19 0849   • metoprolol tartrate (LOPRESSOR) tablet 50 mg  50 mg Oral Q12H Jasen Holcomb MD   50 mg at 08/08/19 0849   • rosuvastatin (CRESTOR) tablet 20 mg  20 mg Oral Nightly Jasen Holcomb MD   20 mg at 08/07/19 2055   • sodium  "chloride 0.9 % flush 3 mL  3 mL Intravenous Q12H Jasen Holcomb MD   3 mL at 08/08/19 0850   • sodium chloride 0.9 % flush 3-10 mL  3-10 mL Intravenous PRN Jasen Holcomb MD       • tamoxifen (NOLVADEX) chemo tablet 20 mg  20 mg Oral Daily Jasen Holcomb MD   20 mg at 08/08/19 0849       Vital Sign Min/Max for last 24 hours  Temp  Min: 97.9 °F (36.6 °C)  Max: 98.1 °F (36.7 °C)   BP  Min: 92/55  Max: 110/64   Pulse  Min: 93  Max: 145   Resp  Min: 18  Max: 20   SpO2  Min: 92 %  Max: 94 %   No Data Recorded   Weight  Min: 109 kg (239 lb 11.2 oz)  Max: 109 kg (239 lb 11.2 oz)     Flowsheet Rows      First Filed Value   Admission Height  172.7 cm (68\") Documented at 08/05/2019 1415   Admission Weight  107 kg (235 lb 9.6 oz) Documented at 08/05/2019 1415          Results for orders placed during the hospital encounter of 08/05/19   Adult Transthoracic Echo Complete W/ Cont if Necessary Per Protocol    Narrative · Estimated EF = 65%.  · Left ventricular systolic function is normal.  · Left ventricular diastolic dysfunction.  · Left atrial cavity size is mildly dilated.  · There is severe calcification of the aortic valve.  · Severe aortic valve stenosis is present.  · Right ventricular cavity is mild-to-moderately dilated.  · RV to LV ratio just above 1          Physical Exam:    General Appearance: Awake, alert, in no acute distress  Eyes: Pupils equal and reactive    Ears: Appear intact with no abnormalities noted  Nose: Nares normal, no drainage  Neck: supple, trachea midline, no carotid bruit and no JVD  Back: no kyphosis present,    Lungs: respirations regular, respirations even and respirations unlabored    Heart: normal S1, S2,  2/6 pansystolic murmur in the left sternal border,  no rub and no click    Abdomen: normal bowel sounds, no tenderness   Skin: no bleeding, bruising or rash  Extremities: no cyanosis  Marginal improvement in lower extremity pedal edema  Psychiatric/Behavioral: Negative for agitation, behavioral " problems, confusion, the patient does  appear to be nervous/anxious.          Results Review:   I reviewed the patient's new clinical results.  I reviewed the patient's new imaging results and agree with the interpretation.  I reviewed the patient's other test results and agree with the interpretation  I personally viewed and interpreted the patient's EKG/Telemetry data  Discussed with patient    Reviewed available prior notes, consults, prior visits, laboratory findings, radiology and cardiology relevant reports. Updated chart as applicable. I have reviewed the patient's medical history in detail and updated the computerized patient record as relevant.      Updated patient regarding any new or relevant abnormalities on review of records or any new findings on physical exam. Mentioned to patient about purpose of visit and desirable health short and long term goals and objectives.     Assessment/Plan       CHF (congestive heart failure) (CMS/[facility])    Congestive cardiac failure (CMS/[facility])  Pulmonary embolism  Bilateral lower extremity deep vein thrombosis  Advanced age  Subdural hematoma with possible recent bleeding  Severe aortic stenosis  Hyponatremia  Volume overload  Pedal edema        Plan      Appreciate Dr. Bhat's input and advice  Continue current medication  Check echocardiogram to see if there is any improvement in right ventricular dimension.  I am leaning towards treating him with anticoagulation and avoid intraoperative coronary lytic therapy as I feel this will increase his risk of bleeding including intracranial bleed  Further decision based on RV size and strain    Continue on IV diuretics  Monitor kidney functions closely including sodium levels    Supportive care  Telemetry  Optimal medical therapy    Deep vein thrombosis prophylaxis/precautions, currently anticoagulated  Appropriate diet, fluid, sodium, caffeine, stimulants intake     Questions were encouraged, asked and answered to the patient's   understanding and satisfaction.    Compliance to diet and medications   Avoid NSAIDS    Jasen Holcomb MD  08/08/19  6:47 PM    EMR Dragon/Transcription was used to dictate part of this note

## 2019-08-08 NOTE — PROGRESS NOTES
LOS: 1 day   Patient Care Team:  RHETT Mcgee MD as PCP - General (Family Medicine)    Chief Complaint:   CHF (congestive heart failure) (CMS/HCC)    Congestive cardiac failure (CMS/HCC)    Shortness of breath    Subjective    Feeling better  No chest pain  Baseline shortness of breath  No palpitation  States is urinating more  No orthopnea  No paroxysmal nocturnal dyspnea  Results of echocardiogram reviewed and mentioned to patient    Interval History: Improved overall marginally    Telemetry: no malignant arrhythmia. No significant pauses.    Review of Systems     Constitutional: No chills   Has fatigue   No fever.     HENT: Negative.    Eyes: Negative.      Respiratory: Negative for cough,   No chest wall soreness,   Shortness of breath,   no wheezing, no stridor.      Cardiovascular: Shortness of breath and massive bipedal edema  Gastrointestinal: Negative for abdominal distention,  No abdominal pain,   No blood in stool,   No constipation,   No diarrhea,   No nausea   No vomiting.     Endocrine: Negative.    Genitourinary: Negative for difficulty urinating, dysuria, flank pain and hematuria.     Musculoskeletal: Negative.    Skin: Negative for rash and wound.   Allergic/Immunologic: Negative.      Neurological: Negative for dizziness, syncope, weakness,   No light-headedness  No  headaches.     Hematological: Does not bruise/bleed easily.     Psychiatric/Behavioral: Negative for agitation or behavioral problems,   No confusion,   the patient is  nervous/anxious.       History:   Past Medical History:   Diagnosis Date   • Atrial fibrillation (CMS/HCC)    • Elevated cholesterol    • Hypertension      Past Surgical History:   Procedure Laterality Date   • APPENDECTOMY     • CRANIOTOMY FOR SUBDURAL HEMATOMA Bilateral 6/16/2019    Procedure: CRANIOTOMY FOR SUBDURAL HEMATOMA;  Surgeon: Galen Bhat MD;  Location: [facility];  Service: Neurosurgery   • HEMORROIDECTOMY     • UMBILICAL HERNIA REPAIR        Social History     Socioeconomic History   • Marital status:      Spouse name: Not on file   • Number of children: Not on file   • Years of education: Not on file   • Highest education level: Not on file   Tobacco Use   • Smoking status: Former Smoker   • Smokeless tobacco: Former User     Types: Chew     Quit date: 2009   • Tobacco comment: quit at age 20   Substance and Sexual Activity   • Alcohol use: No     Frequency: Never   • Drug use: No   • Sexual activity: Defer     Family History   Problem Relation Age of Onset   • Cancer Mother    • Cancer Father    • No Known Problems Sister    • No Known Problems Brother    • COPD Brother    • Cancer Brother        Labs:  WBC WBC   Date Value Ref Range Status   08/05/2019 9.53 4.80 - 10.80 10*3/mm3 Final      HGB Hemoglobin   Date Value Ref Range Status   08/05/2019 12.3 (L) 14.0 - 18.0 g/dL Final      HCT Hematocrit   Date Value Ref Range Status   08/05/2019 38.2 (L) 40.0 - 52.0 % Final      Platelets Platelets   Date Value Ref Range Status   08/05/2019 103 (L) 130 - 400 10*3/mm3 Final      MCV MCV   Date Value Ref Range Status   08/05/2019 85.8 82.0 - 95.0 fL Final        Results from last 7 days   Lab Units 08/07/19  0444 08/06/19  0436 08/05/19  1640   SODIUM mmol/L 131* 134* 134*   POTASSIUM mmol/L 3.7 4.0 3.8   CHLORIDE mmol/L 91* 93* 96*   CO2 mmol/L 32.0* 32.0* 29.0   BUN mg/dL 30* 26* 29*   CREATININE mg/dL 0.91 0.84 0.94   CALCIUM mg/dL 8.3* 8.3* 8.4   BILIRUBIN mg/dL  --   --  0.7   ALK PHOS U/L  --   --  69   ALT (SGPT) U/L  --   --  20   AST (SGOT) U/L  --   --  29   GLUCOSE mg/dL 117* 169* 199*     Lab Results   Component Value Date    TROPONINI <0.012 08/05/2019     PT/INR:    Protime   Date Value Ref Range Status   08/05/2019 22.1 (H) 11.9 - 14.6 Seconds Final   /  INR   Date Value Ref Range Status   08/05/2019 1.86 (H) 0.91 - 1.09 Final       Imaging Results (last 72 hours)     Procedure Component Value Units Date/Time    US Venous Doppler  Lower Extremity Bilateral (duplex) [[id number]] Updated:  08/06/19 0946    CT Angiogram Chest With Contrast [[id number]] Collected:  08/06/19 0043     Updated:  08/06/19 0056    Narrative:       EXAM: CT ANGIOGRAM CHEST W CONTRAST- - 8/5/2019 9:38 PM CDT     HISTORY: Dyspnea, cardiac origin suspected. Bilateral lower extremity  edema.     COMPARISON: None.      DOSE LENGTH PRODUCT: 529 mGy cm. Automatic exposure control was utilized  to make radiation dose as low as reasonably achievable.     TECHNIQUE: Enhanced axial CT images obtained with multiplanar reformats.  3D postprocessing, including MIPs, performed and images saved to PACS.     FINDINGS:   AIRWAYS/PULMONARY PARENCHYMA: The central airways are midline and  patent. Bibasilar opacities, mildly nodular at the left lower lobe Small  bilateral pleural effusions. No pneumothorax.     VESSELS: Contrast bolus is adequate. There are numerous bilateral  pulmonary emboli involving segmental and subsegmental pulmonary  arteries. Pulmonary artery is enlarged measuring 3.7 cm. The  intraventricular septum appears flattened, concerning for right heart  strain.      CARDIAC:  Normal heart size.  No pericardial effusion.  Scattered  coronary artery calcifications.     MEDIASTINUM: There is no mediastinal or hilar lymphadenopathy by CT size  criteria. Esophagus has normal coarse, caliber and wall thickness.     EXTRATHORACIC: Calcified thyroid nodule projecting inferior to the  thyroid, difficult to measure due to streak artifact from vascular  contrast. The extrathoracic soft tissues are within normal limits.      INCLUDED UPPER ABDOMEN: The visualized portions of the upper abdomen are  within normal limits. Elevation of the right hemidiaphragm.     OSSEOUS: Diffusely heterogeneous bone mineralization. This could be seen  with osteopenia, but infiltrative process is not excluded.          Impression:       1. Positive for pulmonary emboli. Findings concerning for right  heart  strain and pulmonary hypertension.  2. Bibasilar opacities, mildly nodular the left lower lobe. This could  represent evolving infarct or infection. Recommend follow-up after  treatment to evaluate for resolution.   3. Diffusely heterogeneous bone mineralization, this could be due to  osteopenia, other metabolic process or multiple myeloma. Recommend  correlation with patient history.  4. Calcified thyroid nodule. If not previously performed an outside  facility, dedicated thyroid ultrasound could be considered.  5. Scattered coronary artery calcifications.     Results communicated by telephone to the patient's nurse Saritha Chambers  at 12:51 AM on 08/06/2019. She reports that the managing clinician is  aware of the pulmonary emboli and right heart strain.     Preliminary interpretation performed by Elio and I agree with the  preliminary report.     This report was finalized on 08/06/2019 00:53 by Dr Martina Ojeda MD.          Objective     Allergies   Allergen Reactions   • Penicillins Other (See Comments)     Causes mouth to be sore       Medication Review: Performed  Current Facility-Administered Medications   Medication Dose Route Frequency Provider Last Rate Last Dose   • acetaminophen (TYLENOL) tablet 650 mg  650 mg Oral Q4H PRN Jasen Holcomb MD       • bisacodyl (DULCOLAX) suppository 10 mg  10 mg Rectal Daily PRN Jsaen Holcomb MD   10 mg at 08/06/19 1506   • digoxin (LANOXIN) tablet 125 mcg  125 mcg Oral Daily Jasen Holcomb MD   125 mcg at 08/07/19 1112   • enoxaparin (LOVENOX) syringe 80 mg  0.75 mg/kg Subcutaneous Q12H Jasen Holcomb MD   80 mg at 08/07/19 1738   • HYDROcodone-acetaminophen (NORCO)  MG per tablet 1 tablet  1 tablet Oral Q6H PRN Jasen Holcomb MD   1 tablet at 08/07/19 0945   • lisinopril (PRINIVIL,ZESTRIL) tablet 5 mg  5 mg Oral Q24H Jasen Holcomb MD   5 mg at 08/07/19 0814   • metOLazone (ZAROXOLYN) tablet 2.5 mg  2.5 mg Oral Daily Jasen Holcomb MD   2.5 mg at 08/07/19 0942  "  • metoprolol tartrate (LOPRESSOR) tablet 50 mg  50 mg Oral Q12H Jasen Holcomb MD   Stopped at 08/07/19 0908   • rosuvastatin (CRESTOR) tablet 20 mg  20 mg Oral Nightly Jasen Holcomb MD   20 mg at 08/07/19 2055   • sodium chloride 0.9 % flush 3 mL  3 mL Intravenous Q12H Jasen Holcomb MD   3 mL at 08/07/19 0814   • sodium chloride 0.9 % flush 3-10 mL  3-10 mL Intravenous PRN Jasen Holcomb MD       • tamoxifen (NOLVADEX) chemo tablet 20 mg  20 mg Oral Daily Jasen Holcomb MD   20 mg at 08/07/19 0814       Vital Sign Min/Max for last 24 hours  Temp  Min: 97.8 °F (36.6 °C)  Max: 99.6 °F (37.6 °C)   BP  Min: 78/56  Max: 116/68   Pulse  Min: 58  Max: 145   Resp  Min: 18  Max: 22   SpO2  Min: 86 %  Max: 97 %   No Data Recorded   Weight  Min: 109 kg (239 lb 11.2 oz)  Max: 109 kg (239 lb 11.2 oz)     Flowsheet Rows      First Filed Value   Admission Height  172.7 cm (68\") Documented at 08/05/2019 1415   Admission Weight  107 kg (235 lb 9.6 oz) Documented at 08/05/2019 1415          Results for orders placed during the hospital encounter of 08/05/19   Adult Transthoracic Echo Complete W/ Cont if Necessary Per Protocol    Narrative · Estimated EF = 65%.  · Left ventricular systolic function is normal.  · Left ventricular diastolic dysfunction.  · Left atrial cavity size is mildly dilated.  · There is severe calcification of the aortic valve.  · Severe aortic valve stenosis is present.  · Right ventricular cavity is mild-to-moderately dilated.  · RV to LV ratio just above 1          Physical Exam:    General Appearance: Awake, alert, in no acute distress  Eyes: Pupils equal and reactive    Ears: Appear intact with no abnormalities noted  Nose: Nares normal, no drainage  Neck: supple, trachea midline, no carotid bruit and no JVD  Back: no kyphosis present,    Lungs: respirations regular, respirations even and respirations unlabored    Heart: normal S1, S2,  2/6 pansystolic murmur in the left sternal border,  no rub and no " click    Abdomen: normal bowel sounds, no tenderness   Skin: no bleeding, bruising or rash  Extremities: no cyanosis  Massive bipedal edema pitting  Psychiatric/Behavioral: Negative for agitation, behavioral problems, confusion, the patient does  appear to be nervous/anxious.          Results Review:   I reviewed the patient's new clinical results.  I reviewed the patient's new imaging results and agree with the interpretation.  I reviewed the patient's other test results and agree with the interpretation  I personally viewed and interpreted the patient's EKG/Telemetry data  Discussed with patient    Reviewed available prior notes, consults, prior visits, laboratory findings, radiology and cardiology relevant reports. Updated chart as applicable. I have reviewed the patient's medical history in detail and updated the computerized patient record as relevant.      Updated patient regarding any new or relevant abnormalities on review of records or any new findings on physical exam. Mentioned to patient about purpose of visit and desirable health short and long term goals and objectives.     Assessment/Plan       CHF (congestive heart failure) (CMS/HCC)    Congestive cardiac failure (CMS/HCC)  Pulmonary embolism  Bilateral lower extremity deep vein thrombosis  Advanced age  Subdural hematoma with possible recent bleeding  Severe aortic stenosis  Hyponatremia  Volume overload  Pedal edema        Plan    Started on 0.5 mg/kg subcutaneously every 12 hours of Lovenox.  Tolerating this well  We will further increase Lovenox to 0.75 mg/kg subcutaneous every 12 hours.  Echocardiogram performed shows borderline right ventricular enlargement with RV to LV ratio marginally above 1.  Discussed in detail with his daughter on the phone.  She is completely agreeable to us giving him Lovenox for now and repeating echocardiogram to remeasure the RV to LV ratio prior to rushing into doing pulmonary artery catheter lytic therapy.    Home monitoring intracranial hemorrhage.  CT scan of the brain did not show any enlargement and overall stable.  Explained to daughter once again much high risk of intracranial bleed.  Echocardiograms was subsequently read which showed severe aortic stenosis.  Continue on IV diuretics  Monitor kidney functions closely including sodium levels    Supportive care  Telemetry  Optimal medical therapy    Deep vein thrombosis prophylaxis/precautions, currently anticoagulated  Appropriate diet, fluid, sodium, caffeine, stimulants intake     Questions were encouraged, asked and answered to the patient's  understanding and satisfaction.    Compliance to diet and medications   Avoid NSAIDS    Jasen Holcomb MD  08/07/19  9:25 PM    EMR Dragon/Transcription was used to dictate part of this note

## 2019-08-08 NOTE — PROGRESS NOTES
Madi Mancini  79 y.o.      Chief complaint:   Subdural hematoma    Subjective  No complaints this morning.  No events overnight.    Temp:  [97.8 °F (36.6 °C)-98.8 °F (37.1 °C)] 97.9 °F (36.6 °C)  Heart Rate:  [] 99  Resp:  [18-20] 18  BP: ()/(55-69) 107/69      Objective    Neurologic Exam     Mental Status   Oriented to person, place, and time.   Attention: normal.   Speech: speech is normal   Level of consciousness: alert  Knowledge: good.     Cranial Nerves   Cranial nerves II through XII intact.     Motor Exam   Muscle bulk: normal  Overall muscle tone: normal  Right arm pronator drift: absent  Left arm pronator drift: absent    Strength   Strength 5/5 throughout.     Sensory Exam   Light touch normal.   Pinprick normal.     Gait, Coordination, and Reflexes     Gait  Gait: normal    Reflexes   Reflexes 2+ except as noted.         CHF (congestive heart failure) (CMS/[facility])    Congestive cardiac failure (CMS/[facility])      Lab Results (last 24 hours)     Procedure Component Value Units Date/Time    Basic Metabolic Panel [[id number]]  (Abnormal) Collected:  08/08/19 0544    Specimen:  Blood Updated:  08/08/19 0624     Glucose 119 mg/dL      BUN 27 mg/dL      Creatinine 0.81 mg/dL      Sodium 129 mmol/L      Potassium 3.8 mmol/L      Chloride 90 mmol/L      CO2 29.0 mmol/L      Calcium 8.2 mg/dL      eGFR Non African Amer 92 mL/min/1.73      BUN/Creatinine Ratio 33.3     Anion Gap 10.0 mmol/L     Narrative:       GFR Normal >60  Chronic Kidney Disease <60  Kidney Failure <15              Plan:   Patient with right mixed density subdural hematoma.  Currently on full anticoagulation dose of Lovenox.  Repeat CT scan after Lovenox is started with stable.  We will repeat CT scan again tomorrow.  Agree with plan for long-term anticoagulation.    Galen Bhat MD

## 2019-08-09 ENCOUNTER — APPOINTMENT (OUTPATIENT)
Dept: CT IMAGING | Facility: HOSPITAL | Age: 79
End: 2019-08-09

## 2019-08-09 LAB
ANION GAP SERPL CALCULATED.3IONS-SCNC: 7 MMOL/L (ref 4–13)
BUN BLD-MCNC: 24 MG/DL (ref 5–21)
BUN/CREAT SERPL: 26.7 (ref 7–25)
CALCIUM SPEC-SCNC: 8.5 MG/DL (ref 8.4–10.4)
CHLORIDE SERPL-SCNC: 91 MMOL/L (ref 98–110)
CO2 SERPL-SCNC: 31 MMOL/L (ref 24–31)
CREAT BLD-MCNC: 0.9 MG/DL (ref 0.5–1.4)
GFR SERPL CREATININE-BSD FRML MDRD: 81 ML/MIN/1.73
GLUCOSE BLD-MCNC: 114 MG/DL (ref 70–100)
NT-PROBNP SERPL-MCNC: 3210 PG/ML (ref 0–1800)
POTASSIUM BLD-SCNC: 3.8 MMOL/L (ref 3.5–5.3)
SODIUM BLD-SCNC: 129 MMOL/L (ref 135–145)

## 2019-08-09 PROCEDURE — 99024 POSTOP FOLLOW-UP VISIT: CPT | Performed by: NURSE PRACTITIONER

## 2019-08-09 PROCEDURE — 83880 ASSAY OF NATRIURETIC PEPTIDE: CPT | Performed by: INTERNAL MEDICINE

## 2019-08-09 PROCEDURE — 25010000002 ENOXAPARIN PER 10 MG: Performed by: INTERNAL MEDICINE

## 2019-08-09 PROCEDURE — 80048 BASIC METABOLIC PNL TOTAL CA: CPT | Performed by: INTERNAL MEDICINE

## 2019-08-09 PROCEDURE — 99223 1ST HOSP IP/OBS HIGH 75: CPT | Performed by: SURGERY

## 2019-08-09 PROCEDURE — 94760 N-INVAS EAR/PLS OXIMETRY 1: CPT

## 2019-08-09 PROCEDURE — 99233 SBSQ HOSP IP/OBS HIGH 50: CPT | Performed by: INTERNAL MEDICINE

## 2019-08-09 PROCEDURE — 94799 UNLISTED PULMONARY SVC/PX: CPT

## 2019-08-09 PROCEDURE — 70450 CT HEAD/BRAIN W/O DYE: CPT

## 2019-08-09 RX ADMIN — BISACODYL 10 MG: 10 SUPPOSITORY RECTAL at 06:24

## 2019-08-09 RX ADMIN — SODIUM CHLORIDE, PRESERVATIVE FREE 3 ML: 5 INJECTION INTRAVENOUS at 08:59

## 2019-08-09 RX ADMIN — LISINOPRIL 5 MG: 5 TABLET ORAL at 08:59

## 2019-08-09 RX ADMIN — Medication 9 MG: at 21:41

## 2019-08-09 RX ADMIN — TAMOXIFEN CITRATE 20 MG: 20 TABLET ORAL at 08:59

## 2019-08-09 RX ADMIN — MILK OF MAGNESIA 10 ML: 2400 CONCENTRATE ORAL at 21:42

## 2019-08-09 RX ADMIN — METOPROLOL TARTRATE 50 MG: 50 TABLET ORAL at 08:59

## 2019-08-09 RX ADMIN — APIXABAN 2.5 MG: 2.5 TABLET, FILM COATED ORAL at 17:36

## 2019-08-09 RX ADMIN — DIGOXIN 125 MCG: 125 TABLET ORAL at 11:00

## 2019-08-09 RX ADMIN — ROSUVASTATIN CALCIUM 20 MG: 20 TABLET, FILM COATED ORAL at 21:42

## 2019-08-09 RX ADMIN — HYDROCODONE BITARTRATE AND ACETAMINOPHEN 1 TABLET: 10; 325 TABLET ORAL at 06:24

## 2019-08-09 RX ADMIN — HYDROCODONE BITARTRATE AND ACETAMINOPHEN 1 TABLET: 10; 325 TABLET ORAL at 14:41

## 2019-08-09 RX ADMIN — HYDROCODONE BITARTRATE AND ACETAMINOPHEN 1 TABLET: 10; 325 TABLET ORAL at 21:41

## 2019-08-09 RX ADMIN — ENOXAPARIN SODIUM 80 MG: 80 INJECTION SUBCUTANEOUS at 06:25

## 2019-08-09 NOTE — PROGRESS NOTES
"Madi Mancini  79 y.o.      Chief complaint:   No complaints    Subjective  No events    Temp:  [97.9 °F (36.6 °C)-98.5 °F (36.9 °C)] 98.5 °F (36.9 °C)  Heart Rate:  [] 119  Resp:  [18] 18  BP: ()/(54-75) 96/72      Objective:  General Appearance:  Comfortable, well-appearing, in no acute distress and not in pain.    Vital signs: (most recent): Blood pressure 96/72, pulse 119, temperature 98.5 °F (36.9 °C), temperature source Oral, resp. rate 18, height 172.7 cm (67.99\"), weight 109 kg (241 lb 4.8 oz), SpO2 97 %.  Vital signs are normal.  No fever.    Output: Producing urine.    HEENT: Normal HEENT exam.    Lungs:  Normal effort and normal respiratory rate.  He is not in respiratory distress.  (Patient has oxygen on per nasal cannula)  Heart: Normal rate.  Regular rhythm.    Chest: Symmetric chest wall expansion.   Extremities: There is local swelling.    Skin:  Warm and dry.    Abdomen: Abdomen is soft.  Bowel sounds are normal.     Pulses: Distal pulses are intact.        Neurologic Exam     Mental Status   Oriented to person, place, and time.   Attention: normal. Concentration: normal.   Speech: speech is normal   Level of consciousness: alert    Cranial Nerves   Cranial nerves II through XII intact.     Motor Exam   Muscle bulk: normal    Strength   Strength 5/5 throughout. Limited use of right arm due to right shoulder pain     Sensory Exam   Light touch normal.         CHF (congestive heart failure) (CMS/[facility])    Congestive cardiac failure (CMS/[facility])      Lab Results (last 24 hours)     Procedure Component Value Units Date/Time    BNP [[id number]] Collected:  08/09/19 0555    Specimen:  Blood Updated:  08/09/19 0809    Basic Metabolic Panel [[id number]]  (Abnormal) Collected:  08/09/19 0555    Specimen:  Blood Updated:  08/09/19 0637     Glucose 114 mg/dL      BUN 24 mg/dL      Creatinine 0.90 mg/dL      Sodium 129 mmol/L      Potassium 3.8 mmol/L      Chloride 91 mmol/L      CO2 31.0 mmol/L      " Calcium 8.5 mg/dL      eGFR Non African Amer 81 mL/min/1.73      BUN/Creatinine Ratio 26.7     Anion Gap 7.0 mmol/L     Narrative:       GFR Normal >60  Chronic Kidney Disease <60  Kidney Failure <15              Plan:   CV: Heart rate and blood pressure stable  Respiratory: Oxygen level stable on oxygen per nasal cannula  Tolerating p.o.  Adequate urine output  Neuro exam stable  Her cardiology is not felt that the right ventricle is under a significant strain or enlargement at this time.  It is not felt that lytic therapy would be the best appropriate action for the patient at this time.  He remains on full dose Lovenox.  We will repeat the CT scan of his head today to ensure that the subdural hematoma is not getting any worse.  In talking to cardiology they want to have a vascular come and see the patient and discussed the possible IVC filter due to the DVTs in his legs.  Once this has been done we can consider full anticoagulation with possibly Eliquis.  We will continue to monitor the patient's neuro status as well as having serial CT scans of the head done.    Marco A Maya APRN

## 2019-08-09 NOTE — PLAN OF CARE
Problem: Patient Care Overview  Goal: Plan of Care Review  Outcome: Ongoing (interventions implemented as appropriate)   08/09/19 1403   Coping/Psychosocial   Plan of Care Reviewed With patient   Plan of Care Review   Progress improving   OTHER   Outcome Summary Pt A&O. Room air. Getting around well. Bilat lower extremities improving w/ edema. Lovenox changed to elequist after head CT neg for any worsening of bleed. VSS. Safety maintained.

## 2019-08-09 NOTE — CONSULTS
Madi Mancini  [id number]  [id number]  404/1  Jasen Holcomb MD  8/5/2019    No chief complaint on file.      HPI: This is a 79-year-old male gentleman who went to the operating room on June 16, 2019 for a bilateral craniotomy for subdural hematoma evacuation.  The patient did initially have some difficulty postoperatively there was a large amount of pneumocephalus present however he has been following up in our clinic as an outpatient and has been making improvement.  He currently is residing in a nursing home.  The patient has had increasing difficulty with shortness of breath and swelling in his lower extremities bilaterally.  He was seen by Dr. Holcomb and felt the patient was in congestive heart failure and was subsequently admitted to the hospital for acute treatment.  CT angiogram of the chest was performed which did reveal pulmonary emboli as well as a right heart strain and pulmonary hypertension.  Bilateral Doppler ultrasound of the lower extremities show bilateral lower extremity DVT. The patient has been off anticoagulation since his craniotomy.  Scan of the head yesterday shows improvement in the left subdural but the right subdural has not changed and does appear to be with some increased density however it is stable compared to the previous CT scan of the head.  From a functional standpoint the patient has made improvement since being in the hospital from the craniotomy.  He is having difficulty with right arm due to a shoulder issue at this time.  He is currently on Eliquis and tolerating it well.      Past Medical History:   Diagnosis Date   • Atrial fibrillation (CMS/HCC)    • Elevated cholesterol    • Hypertension        Past Surgical History:   Procedure Laterality Date   • APPENDECTOMY     • CRANIOTOMY FOR SUBDURAL HEMATOMA Bilateral 6/16/2019    Procedure: CRANIOTOMY FOR SUBDURAL HEMATOMA;  Surgeon: Galen Bhat MD;  Location: [facility];  Service: Neurosurgery   • HEMORROIDECTOMY     •  UMBILICAL HERNIA REPAIR         Family History   Problem Relation Age of Onset   • Cancer Mother    • Cancer Father    • No Known Problems Sister    • No Known Problems Brother    • COPD Brother    • Cancer Brother        Social History     Socioeconomic History   • Marital status:      Spouse name: Not on file   • Number of children: Not on file   • Years of education: Not on file   • Highest education level: Not on file   Tobacco Use   • Smoking status: Former Smoker   • Smokeless tobacco: Former User     Types: Chew     Quit date: 2009   • Tobacco comment: quit at age 20   Substance and Sexual Activity   • Alcohol use: No     Frequency: Never   • Drug use: No   • Sexual activity: Defer       Allergies   Allergen Reactions   • Penicillins Other (See Comments)     Causes mouth to be sore       Hospital Medications (active)       Dose Frequency Start End    acetaminophen (TYLENOL) tablet 650 mg 650 mg Every 4 Hours PRN 8/5/2019 6/23/2020    Sig - Route: Take 2 tablets by mouth Every 4 (Four) Hours As Needed for Mild Pain  or Moderate Pain . - Oral    apixaban (ELIQUIS) tablet 2.5 mg 2.5 mg Every 12 Hours Scheduled 8/9/2019     Sig - Route: Take 1 tablet by mouth Every 12 (Twelve) Hours. - Oral    bisacodyl (DULCOLAX) suppository 10 mg 10 mg Daily PRN 8/6/2019     Sig - Route: Insert 1 suppository into the rectum Daily As Needed for Constipation. - Rectal    digoxin (LANOXIN) tablet 125 mcg 125 mcg Daily Digoxin 8/6/2019     Sig - Route: Take 1 tablet by mouth Daily. - Oral    HYDROcodone-acetaminophen (NORCO)  MG per tablet 1 tablet 1 tablet Every 6 Hours PRN 8/5/2019     Sig - Route: Take 1 tablet by mouth Every 6 (Six) Hours As Needed for Moderate Pain . - Oral    lisinopril (PRINIVIL,ZESTRIL) tablet 5 mg 5 mg Every 24 Hours Scheduled 8/5/2019     Sig - Route: Take 1 tablet by mouth Daily. - Oral    melatonin tablet 9.75 mg 9.75 mg Nightly PRN 8/7/2019 8/12/2019    Sig - Route: Take 3.25 tablets by  "mouth At Night As Needed for Sleep. - Oral    metoprolol tartrate (LOPRESSOR) tablet 50 mg 50 mg Every 12 Hours Scheduled 8/5/2019     Sig - Route: Take 1 tablet by mouth Every 12 (Twelve) Hours. - Oral    rosuvastatin (CRESTOR) tablet 20 mg 20 mg Nightly 8/5/2019     Sig - Route: Take 1 tablet by mouth Every Night. - Oral    sodium chloride 0.9 % flush 3 mL 3 mL Every 12 Hours Scheduled 8/5/2019     Sig - Route: Infuse 3 mL into a venous catheter Every 12 (Twelve) Hours. - Intravenous    sodium chloride 0.9 % flush 3-10 mL 3-10 mL As Needed 8/5/2019     Sig - Route: Infuse 3-10 mL into a venous catheter As Needed for Line Care. - Intravenous    tamoxifen (NOLVADEX) chemo tablet 20 mg 20 mg Daily 8/5/2019     Sig - Route: Take 1 tablet by mouth Daily. - Oral    enoxaparin (LOVENOX) syringe 80 mg (Discontinued) 0.75 mg/kg × 107 kg Every 12 Hours 8/7/2019 8/9/2019    Sig - Route: Inject 0.8 mL under the skin into the appropriate area as directed Every 12 (Twelve) Hours. - Subcutaneous    metOLazone (ZAROXOLYN) tablet 2.5 mg (Discontinued) 2.5 mg Daily 8/5/2019 8/9/2019    Sig - Route: Take 1 tablet by mouth Daily. - Oral          Review of Systems  Constitutional: Positive for activity change. Negative for fever.   HENT: Negative.    Eyes: Negative.    Respiratory: Positive for shortness of breath.    Cardiovascular: Positive for leg swelling.   Gastrointestinal: Negative.    Genitourinary: Negative.    Musculoskeletal: Positive for arthralgias.   Allergic/Immunologic: Negative.    Neurological: Positive for weakness.   Psychiatric/Behavioral: Negative.    All other systems reviewed and are negative.      BP 99/55 (BP Location: Right arm, Patient Position: Sitting)   Pulse 76   Temp 98.9 °F (37.2 °C) (Oral)   Resp 18   Ht 172.7 cm (67.99\")   Wt 109 kg (241 lb 4.8 oz)   SpO2 99%   BMI 36.70 kg/m²     Physical Exam   Constitutional: He is oriented to person, place, and time. He appears well-developed and " well-nourished.   HENT:   Head: Normocephalic and atraumatic.   Eyes: Pupils are equal, round, and reactive to light. No scleral icterus.   Neck: Neck supple. No JVD present. Carotid bruit is not present. No thyromegaly present.   Cardiovascular: Normal rate, regular rhythm and normal heart sounds.   Pulses:       Carotid pulses are 2+ on the right side, and 2+ on the left side.       Femoral pulses are 2+ on the right side, and 2+ on the left side.       Popliteal pulses are 2+ on the right side, and 2+ on the left side.        Dorsalis pedis pulses are 2+ on the right side, and 2+ on the left side.        Posterior tibial pulses are 2+ on the right side, and 2+ on the left side.   Pulmonary/Chest: Effort normal and breath sounds normal.   Abdominal: Soft. Bowel sounds are normal. He exhibits no distension, no abdominal bruit and no mass. There is no hepatosplenomegaly. There is no tenderness.   Musculoskeletal: Normal range of motion. He exhibits no edema.   Lymphadenopathy:     He has no cervical adenopathy.   Neurological: He is alert and oriented to person, place, and time. He has normal strength. No cranial nerve deficit or sensory deficit.   Skin: Skin is warm, dry and intact.   Psychiatric: He has a normal mood and affect.   Nursing note and vitals reviewed.      Laboratory Data:  Results from last 7 days   Lab Units 08/05/19  1640   WBC 10*3/mm3 9.53   HEMOGLOBIN g/dL 12.3*   HEMATOCRIT % 38.2*   PLATELETS 10*3/mm3 103*       Results from last 7 days   Lab Units 08/09/19  0555 08/08/19  0544 08/07/19  0444  08/05/19  1640   SODIUM mmol/L 129* 129* 131*   < > 134*   POTASSIUM mmol/L 3.8 3.8 3.7   < > 3.8   CHLORIDE mmol/L 91* 90* 91*   < > 96*   CO2 mmol/L 31.0 29.0 32.0*   < > 29.0   BUN mg/dL 24* 27* 30*   < > 29*   CREATININE mg/dL 0.90 0.81 0.91   < > 0.94   CALCIUM mg/dL 8.5 8.2* 8.3*   < > 8.4   BILIRUBIN mg/dL  --   --   --   --  0.7   ALK PHOS U/L  --   --   --   --  69   ALT (SGPT) U/L  --   --   --    --  20   AST (SGOT) U/L  --   --   --   --  29   GLUCOSE mg/dL 114* 119* 117*   < > 199*    < > = values in this interval not displayed.     Results from last 7 days   Lab Units 08/05/19  1640   INR  1.86*         Diagnostic Data:  Imaging Results (last 24 hours)     Procedure Component Value Units Date/Time    CT Head Without Contrast [[id number]] Collected:  08/09/19 1145     Updated:  08/09/19 1152    Narrative:       CT HEAD WO CONTRAST- 8/9/2019 11:17 AM CDT     HISTORY: Sub-dural hemorrhage       DOSE LENGTH PRODUCT: 688 mGy cm. Automated exposure control was also  utilized to decrease patient radiation dose.     Technique:   Axial CT of the brain without IV contrast. Sagittal and coronal  reformations are also provided for review. Soft tissue and bone kernels  are available for interpretation.     Comparison: CT scan dated 08/07/2019.     Findings:      Bur holes along the frontal and right parietal convexities. Acute on  chronic right subdural hematoma measuring up to 1.2 cm in greatest  thickness. This is stable. Mild mass effect on the underlying parenchyma  without significant mass effect on the right lateral ventricle.  Additional minimal subdural hemorrhage along the left frontal convexity  which is a more chronic appearance. No midline shift. No intra-axial  hemorrhage. The basal cisterns are symmetric. Ventricles are nondilated.  Posterior fossa structures are unremarkable. Paranasal sinuses are  clear.       Impression:       Impression:    1. Stable acute on chronic right cerebral convexity subdural hematoma  measuring up to 1.2 cm in greatest thickness. No midline shift.  2. Smaller left frontal convexity subdural hematoma which has a more  chronic appearance. No significant mass effect.  This report was finalized on 08/09/2019 11:49 by Dr Lb Edwards, .          Impression:    CHF (congestive heart failure) (CMS/HCC)    Congestive cardiac failure (CMS/HCC)  Bilateral pulmonary  embolus  Bilateral lower extremity DVT    Plan: After thoroughly evaluating Madi Mancini, I believe the best course of action is to remain conservative from a vascular surgery standpoint.  Currently I would hold off on the IVC filter if he is able to tolerate anticoagulation.  If there comes a point where anticoagulation is no longer tolerable due to bleeding then an IVC filter can be placed.  This case was discussed with Dr. Bhat.  Serial short-term CT scan imaging is recommended.  Thank you for allowing me to see Madi Mancini in consult. Please feel free to reach out with any questions or concerns.    Burton Anaya,   8/9/2019  4:39 PM

## 2019-08-10 LAB
ANION GAP SERPL CALCULATED.3IONS-SCNC: 8 MMOL/L (ref 4–13)
BUN BLD-MCNC: 19 MG/DL (ref 5–21)
BUN/CREAT SERPL: 25.3 (ref 7–25)
CALCIUM SPEC-SCNC: 8.5 MG/DL (ref 8.4–10.4)
CHLORIDE SERPL-SCNC: 94 MMOL/L (ref 98–110)
CO2 SERPL-SCNC: 29 MMOL/L (ref 24–31)
CREAT BLD-MCNC: 0.75 MG/DL (ref 0.5–1.4)
GFR SERPL CREATININE-BSD FRML MDRD: 100 ML/MIN/1.73
GLUCOSE BLD-MCNC: 112 MG/DL (ref 70–100)
POTASSIUM BLD-SCNC: 4.2 MMOL/L (ref 3.5–5.3)
SODIUM BLD-SCNC: 131 MMOL/L (ref 135–145)

## 2019-08-10 PROCEDURE — 99232 SBSQ HOSP IP/OBS MODERATE 35: CPT | Performed by: PHYSICIAN ASSISTANT

## 2019-08-10 PROCEDURE — 80048 BASIC METABOLIC PNL TOTAL CA: CPT | Performed by: INTERNAL MEDICINE

## 2019-08-10 RX ADMIN — DIGOXIN 125 MCG: 125 TABLET ORAL at 12:20

## 2019-08-10 RX ADMIN — APIXABAN 2.5 MG: 2.5 TABLET, FILM COATED ORAL at 20:18

## 2019-08-10 RX ADMIN — APIXABAN 2.5 MG: 2.5 TABLET, FILM COATED ORAL at 08:29

## 2019-08-10 RX ADMIN — METOPROLOL TARTRATE 50 MG: 50 TABLET ORAL at 20:18

## 2019-08-10 RX ADMIN — SODIUM CHLORIDE, PRESERVATIVE FREE 3 ML: 5 INJECTION INTRAVENOUS at 20:18

## 2019-08-10 RX ADMIN — SODIUM CHLORIDE, PRESERVATIVE FREE 3 ML: 5 INJECTION INTRAVENOUS at 08:29

## 2019-08-10 RX ADMIN — HYDROCODONE BITARTRATE AND ACETAMINOPHEN 1 TABLET: 10; 325 TABLET ORAL at 05:19

## 2019-08-10 RX ADMIN — HYDROCODONE BITARTRATE AND ACETAMINOPHEN 1 TABLET: 10; 325 TABLET ORAL at 14:36

## 2019-08-10 RX ADMIN — LISINOPRIL 5 MG: 5 TABLET ORAL at 08:29

## 2019-08-10 RX ADMIN — ROSUVASTATIN CALCIUM 20 MG: 20 TABLET, FILM COATED ORAL at 20:18

## 2019-08-10 RX ADMIN — Medication 9.75 MG: at 21:29

## 2019-08-10 RX ADMIN — TAMOXIFEN CITRATE 20 MG: 20 TABLET ORAL at 08:29

## 2019-08-10 RX ADMIN — METOPROLOL TARTRATE 50 MG: 50 TABLET ORAL at 08:29

## 2019-08-10 NOTE — PROGRESS NOTES
LOS: 3 days   Patient Care Team:  RHETT Mcgee MD as PCP - General (Family Medicine)    Chief Complaint:   CHF (congestive heart failure) (CMS/HCC)    Congestive cardiac failure (CMS/HCC)    Shortness of breath    Subjective    Feeling better  No chest pain  Baseline shortness of breath  No palpitation  Leg swelling has decreased further  Echocardiogram shows right ventricular size has decreased and now is less than left ventricular size  No orthopnea  No paroxysmal nocturnal dyspnea  Echo results discussed with him  Labs reviewed    Interval History: Improved overall marginally    Telemetry: no malignant arrhythmia. No significant pauses.    Review of Systems     Constitutional: No chills   Has fatigue   No fever.     HENT: Negative.    Eyes: Negative.      Respiratory: Negative for cough,   No chest wall soreness,   Shortness of breath,   no wheezing, no stridor.      Cardiovascular: Shortness of breath and massive bipedal edema  Gastrointestinal: Negative for abdominal distention,  No abdominal pain,   No blood in stool,   No constipation,   No diarrhea,   No nausea   No vomiting.     Endocrine: Negative.    Genitourinary: Negative for difficulty urinating, dysuria, flank pain and hematuria.     Musculoskeletal: Negative.    Skin: Negative for rash and wound.   Allergic/Immunologic: Negative.      Neurological: Negative for dizziness, syncope, weakness,   No light-headedness  No  headaches.     Hematological: Does not bruise/bleed easily.     Psychiatric/Behavioral: Negative for agitation or behavioral problems,   No confusion,   the patient is  nervous/anxious.       History:   Past Medical History:   Diagnosis Date   • Atrial fibrillation (CMS/HCC)    • Elevated cholesterol    • Hypertension      Past Surgical History:   Procedure Laterality Date   • APPENDECTOMY     • CRANIOTOMY FOR SUBDURAL HEMATOMA Bilateral 6/16/2019    Procedure: CRANIOTOMY FOR SUBDURAL HEMATOMA;  Surgeon: Galen Bhat MD;   Location: [facility] OR;  Service: Neurosurgery   • HEMORROIDECTOMY     • UMBILICAL HERNIA REPAIR       Social History     Socioeconomic History   • Marital status:      Spouse name: Not on file   • Number of children: Not on file   • Years of education: Not on file   • Highest education level: Not on file   Tobacco Use   • Smoking status: Former Smoker   • Smokeless tobacco: Former User     Types: Chew     Quit date: 2009   • Tobacco comment: quit at age 20   Substance and Sexual Activity   • Alcohol use: No     Frequency: Never   • Drug use: No   • Sexual activity: Defer     Family History   Problem Relation Age of Onset   • Cancer Mother    • Cancer Father    • No Known Problems Sister    • No Known Problems Brother    • COPD Brother    • Cancer Brother        Labs:  WBC No results found for: WBC   HGB No results found for: HGB   HCT No results found for: HCT   Platelets No results found for: PLT   MCV No results found for: MCV     Results from last 7 days   Lab Units 08/09/19  0555 08/08/19  0544 08/07/19  0444  08/05/19  1640   SODIUM mmol/L 129* 129* 131*   < > 134*   POTASSIUM mmol/L 3.8 3.8 3.7   < > 3.8   CHLORIDE mmol/L 91* 90* 91*   < > 96*   CO2 mmol/L 31.0 29.0 32.0*   < > 29.0   BUN mg/dL 24* 27* 30*   < > 29*   CREATININE mg/dL 0.90 0.81 0.91   < > 0.94   CALCIUM mg/dL 8.5 8.2* 8.3*   < > 8.4   BILIRUBIN mg/dL  --   --   --   --  0.7   ALK PHOS U/L  --   --   --   --  69   ALT (SGPT) U/L  --   --   --   --  20   AST (SGOT) U/L  --   --   --   --  29   GLUCOSE mg/dL 114* 119* 117*   < > 199*    < > = values in this interval not displayed.     Lab Results   Component Value Date    TROPONINI <0.012 08/05/2019     PT/INR:    No results found for: PROTIME/  No results found for: INR    Imaging Results (last 72 hours)     Procedure Component Value Units Date/Time    US Venous Doppler Lower Extremity Bilateral (duplex) [[id number]] Updated:  08/06/19 0946    CT Angiogram Chest With Contrast [[id number]]  Collected:  08/06/19 0043     Updated:  08/06/19 0056    Narrative:       EXAM: CT ANGIOGRAM CHEST W CONTRAST- - 8/5/2019 9:38 PM CDT     HISTORY: Dyspnea, cardiac origin suspected. Bilateral lower extremity  edema.     COMPARISON: None.      DOSE LENGTH PRODUCT: 529 mGy cm. Automatic exposure control was utilized  to make radiation dose as low as reasonably achievable.     TECHNIQUE: Enhanced axial CT images obtained with multiplanar reformats.  3D postprocessing, including MIPs, performed and images saved to PACS.     FINDINGS:   AIRWAYS/PULMONARY PARENCHYMA: The central airways are midline and  patent. Bibasilar opacities, mildly nodular at the left lower lobe Small  bilateral pleural effusions. No pneumothorax.     VESSELS: Contrast bolus is adequate. There are numerous bilateral  pulmonary emboli involving segmental and subsegmental pulmonary  arteries. Pulmonary artery is enlarged measuring 3.7 cm. The  intraventricular septum appears flattened, concerning for right heart  strain.      CARDIAC:  Normal heart size.  No pericardial effusion.  Scattered  coronary artery calcifications.     MEDIASTINUM: There is no mediastinal or hilar lymphadenopathy by CT size  criteria. Esophagus has normal coarse, caliber and wall thickness.     EXTRATHORACIC: Calcified thyroid nodule projecting inferior to the  thyroid, difficult to measure due to streak artifact from vascular  contrast. The extrathoracic soft tissues are within normal limits.      INCLUDED UPPER ABDOMEN: The visualized portions of the upper abdomen are  within normal limits. Elevation of the right hemidiaphragm.     OSSEOUS: Diffusely heterogeneous bone mineralization. This could be seen  with osteopenia, but infiltrative process is not excluded.          Impression:       1. Positive for pulmonary emboli. Findings concerning for right heart  strain and pulmonary hypertension.  2. Bibasilar opacities, mildly nodular the left lower lobe. This could  represent  evolving infarct or infection. Recommend follow-up after  treatment to evaluate for resolution.   3. Diffusely heterogeneous bone mineralization, this could be due to  osteopenia, other metabolic process or multiple myeloma. Recommend  correlation with patient history.  4. Calcified thyroid nodule. If not previously performed an outside  facility, dedicated thyroid ultrasound could be considered.  5. Scattered coronary artery calcifications.     Results communicated by telephone to the patient's nurse Saritha Darling  at 12:51 AM on 08/06/2019. She reports that the managing clinician is  aware of the pulmonary emboli and right heart strain.     Preliminary interpretation performed by Elio and I agree with the  preliminary report.     This report was finalized on 08/06/2019 00:53 by Dr Martina Ojeda MD.          Objective     Allergies   Allergen Reactions   • Penicillins Other (See Comments)     Causes mouth to be sore       Medication Review: Performed  Current Facility-Administered Medications   Medication Dose Route Frequency Provider Last Rate Last Dose   • acetaminophen (TYLENOL) tablet 650 mg  650 mg Oral Q4H PRN Jasen Holcomb MD       • apixaban (ELIQUIS) tablet 2.5 mg  2.5 mg Oral Q12H Jasen Holcomb MD   2.5 mg at 08/09/19 1736   • bisacodyl (DULCOLAX) suppository 10 mg  10 mg Rectal Daily PRN Jasen Holcomb MD   10 mg at 08/09/19 0624   • digoxin (LANOXIN) tablet 125 mcg  125 mcg Oral Daily Jasen Holcomb MD   125 mcg at 08/09/19 1100   • HYDROcodone-acetaminophen (NORCO)  MG per tablet 1 tablet  1 tablet Oral Q6H PRN Jasen Holcomb MD   1 tablet at 08/09/19 1441   • lisinopril (PRINIVIL,ZESTRIL) tablet 5 mg  5 mg Oral Q24H Jasen Holcomb MD   5 mg at 08/09/19 0859   • melatonin tablet 9.75 mg  9.75 mg Oral Nightly PRN Shaheen Aj MD   9 mg at 08/08/19 2138   • metoprolol tartrate (LOPRESSOR) tablet 50 mg  50 mg Oral Q12H Jasen Holcomb MD   50 mg at 08/09/19 0859   • rosuvastatin (CRESTOR) tablet 20  "mg  20 mg Oral Nightly Jasen Holcomb MD   20 mg at 08/08/19 2140   • sodium chloride 0.9 % flush 3 mL  3 mL Intravenous Q12H Jasen Holcomb MD   3 mL at 08/09/19 0859   • sodium chloride 0.9 % flush 3-10 mL  3-10 mL Intravenous PRN Jasen Holcomb MD       • tamoxifen (NOLVADEX) chemo tablet 20 mg  20 mg Oral Daily Jasen Holcomb MD   20 mg at 08/09/19 0859       Vital Sign Min/Max for last 24 hours  Temp  Min: 97.9 °F (36.6 °C)  Max: 98.9 °F (37.2 °C)   BP  Min: 95/54  Max: 99/55   Pulse  Min: 76  Max: 119   Resp  Min: 18  Max: 18   SpO2  Min: 92 %  Max: 99 %   No Data Recorded   Weight  Min: 109 kg (239 lb 12.8 oz)  Max: 109 kg (241 lb 4.8 oz)     Flowsheet Rows      First Filed Value   Admission Height  172.7 cm (68\") Documented at 08/05/2019 1415   Admission Weight  107 kg (235 lb 9.6 oz) Documented at 08/05/2019 1415          Results for orders placed during the hospital encounter of 08/05/19   Adult Transthoracic Echo Limited W/ Cont if Necessary Per Protocol    Narrative · Estimated EF = 65%.  · Left ventricular wall thickness is consistent with mild concentric   hypertrophy.  · Severe aortic valve stenosis is present.  · Mild pulmonary hypertension is present.  · Normal right ventricular cavity size, wall thickness, systolic function   and septal motion noted.  · RV to LV ratio <1          Physical Exam:    General Appearance: Awake, alert, in no acute distress  Eyes: Pupils equal and reactive    Ears: Appear intact with no abnormalities noted  Nose: Nares normal, no drainage  Neck: supple, trachea midline, no carotid bruit and no JVD  Back: no kyphosis present,    Lungs: respirations regular, respirations even and respirations unlabored    Heart: normal S1, S2,  2/6 pansystolic murmur in the left sternal border,  no rub and no click    Abdomen: normal bowel sounds, no tenderness   Skin: no bleeding, bruising or rash  Extremities: no cyanosis  Marginal improvement in lower extremity pedal " edema  Psychiatric/Behavioral: Negative for agitation, behavioral problems, confusion, the patient does  appear to be nervous/anxious.          Results Review:   I reviewed the patient's new clinical results.  I reviewed the patient's new imaging results and agree with the interpretation.  I reviewed the patient's other test results and agree with the interpretation  I personally viewed and interpreted the patient's EKG/Telemetry data  Discussed with patient    Reviewed available prior notes, consults, prior visits, laboratory findings, radiology and cardiology relevant reports. Updated chart as applicable. I have reviewed the patient's medical history in detail and updated the computerized patient record as relevant.      Updated patient regarding any new or relevant abnormalities on review of records or any new findings on physical exam. Mentioned to patient about purpose of visit and desirable health short and long term goals and objectives.     Assessment/Plan       CHF (congestive heart failure) (CMS/[facility])    Congestive cardiac failure (CMS/[facility])  Pulmonary embolism  Bilateral lower extremity deep vein thrombosis  Advanced age  Subdural hematoma with possible recent bleeding  Severe aortic stenosis  Hyponatremia  Volume overload  Pedal edema        Plan      RV size has decreased  No indication for intra-pulmonary lytic therapy  Will switch from Lovenox to low-dose Eliquis  Discussed with ALONDRA Zamorano neurosurgery  Serial CT head to monitor intracranial bleed  Vascular surgery consultation has he may likely require inferior vena cava filter as overall he is unlikely to be able to   continue with long-term anticoagulation and his risk for deep vein thrombosis will persist.  We will defer however final decision to vascular surgery    Monitor for hyponatremia  We will stop diuretic therapy  He refuses to go with stainless his daughter  Overall difficult social and living situation  Continue restricting  fluids  Monitor kidney functions closely    Jasen Holcomb MD  08/09/19  7:17 PM    EMR Dragon/Transcription was used to dictate part of this note

## 2019-08-10 NOTE — PROGRESS NOTES
"[facility], [facility] Progress Note     LOS: 4 days   Patient Care Team:  RHETT Mcgee MD as PCP - General (Family Medicine)    Chief Complaint:  PE/DVT    Subjective     No CP or SOB    Review of Systems:   A 10-point review of systems is obtained and negative except for otherwise mentioned above.    Objective     Vital Sign Min/Max for last 24 hours  Temp  Min: 97.9 °F (36.6 °C)  Max: 98.9 °F (37.2 °C)   BP  Min: 97/64  Max: 101/63   Pulse  Min: 68  Max: 98   Resp  Min: 18  Max: 18   SpO2  Min: 95 %  Max: 99 %   No Data Recorded   Weight  Min: 109 kg (239 lb 12.8 oz)  Max: 109 kg (239 lb 12.8 oz)     Flowsheet Rows      First Filed Value   Admission Height  172.7 cm (68\") Documented at 08/05/2019 1415   Admission Weight  107 kg (235 lb 9.6 oz) Documented at 08/05/2019 1415          Physical Exam:   General Appearance: alert, appears stated age and cooperative  Lungs: clear to auscultation, respirations regular, respirations even and respirations unlabored  Heart: regular rhythm & normal rate, normal S1, S2, no murmur, no gallop, no rub and no click     Results Review:     I reviewed the patient's new clinical results.    Results from last 7 days   Lab Units 08/05/19  1640   WBC 10*3/mm3 9.53   HEMOGLOBIN g/dL 12.3*   HEMATOCRIT % 38.2*   PLATELETS 10*3/mm3 103*     Results from last 7 days   Lab Units 08/10/19  0346   SODIUM mmol/L 131*   POTASSIUM mmol/L 4.2   CHLORIDE mmol/L 94*   CO2 mmol/L 29.0   BUN mg/dL 19   CREATININE mg/dL 0.75   GLUCOSE mg/dL 112*   CALCIUM mg/dL 8.5     Results from last 7 days   Lab Units 08/10/19  0346  08/05/19  1640   SODIUM mmol/L 131*   < > 134*   POTASSIUM mmol/L 4.2   < > 3.8   CHLORIDE mmol/L 94*   < > 96*   CO2 mmol/L 29.0   < > 29.0   BUN mg/dL 19   < > 29*   CREATININE mg/dL 0.75   < > 0.94   CALCIUM mg/dL 8.5   < > 8.4   BILIRUBIN mg/dL  --   --  0.7   ALK PHOS U/L  --   --  69   ALT (SGPT) U/L  --   --  20   AST (SGOT) U/L  --   --  " 29   GLUCOSE mg/dL 112*   < > 199*    < > = values in this interval not displayed.     Results from last 7 days   Lab Units 08/05/19  1640   TROPONIN I ng/mL <0.012     Results from last 7 days   Lab Units 08/05/19  1640   TSH mIU/mL 0.807           Medication Review: yes    Assessment/Plan     PE  BLE DVT  Subdural hematoma  Severe AS      Vascular wishes to proceed conservatively without placing IVC.  Will continue anticoagulation and monitor serial CT's of the head as per neurosurgery.  Appreciate the assistance of other specialties.    Val Epstein PA-C  08/10/19  1:26 PM

## 2019-08-11 LAB
ANION GAP SERPL CALCULATED.3IONS-SCNC: 7 MMOL/L (ref 4–13)
BUN BLD-MCNC: 20 MG/DL (ref 5–21)
BUN/CREAT SERPL: 31.3 (ref 7–25)
CALCIUM SPEC-SCNC: 8.5 MG/DL (ref 8.4–10.4)
CHLORIDE SERPL-SCNC: 95 MMOL/L (ref 98–110)
CO2 SERPL-SCNC: 29 MMOL/L (ref 24–31)
CREAT BLD-MCNC: 0.64 MG/DL (ref 0.5–1.4)
GFR SERPL CREATININE-BSD FRML MDRD: 120 ML/MIN/1.73
GLUCOSE BLD-MCNC: 111 MG/DL (ref 70–100)
POTASSIUM BLD-SCNC: 4.4 MMOL/L (ref 3.5–5.3)
SODIUM BLD-SCNC: 131 MMOL/L (ref 135–145)

## 2019-08-11 PROCEDURE — 99232 SBSQ HOSP IP/OBS MODERATE 35: CPT | Performed by: PHYSICIAN ASSISTANT

## 2019-08-11 PROCEDURE — 80048 BASIC METABOLIC PNL TOTAL CA: CPT | Performed by: INTERNAL MEDICINE

## 2019-08-11 PROCEDURE — 25010000002 FUROSEMIDE PER 20 MG: Performed by: PHYSICIAN ASSISTANT

## 2019-08-11 RX ORDER — FUROSEMIDE 10 MG/ML
40 INJECTION INTRAMUSCULAR; INTRAVENOUS DAILY
Status: DISCONTINUED | OUTPATIENT
Start: 2019-08-11 | End: 2019-08-12

## 2019-08-11 RX ADMIN — TAMOXIFEN CITRATE 20 MG: 20 TABLET ORAL at 08:52

## 2019-08-11 RX ADMIN — HYDROCODONE BITARTRATE AND ACETAMINOPHEN 1 TABLET: 10; 325 TABLET ORAL at 07:48

## 2019-08-11 RX ADMIN — APIXABAN 2.5 MG: 2.5 TABLET, FILM COATED ORAL at 21:07

## 2019-08-11 RX ADMIN — Medication 9.75 MG: at 21:07

## 2019-08-11 RX ADMIN — FUROSEMIDE 40 MG: 10 INJECTION, SOLUTION INTRAMUSCULAR; INTRAVENOUS at 14:15

## 2019-08-11 RX ADMIN — METOPROLOL TARTRATE 50 MG: 50 TABLET ORAL at 21:07

## 2019-08-11 RX ADMIN — SODIUM CHLORIDE, PRESERVATIVE FREE 3 ML: 5 INJECTION INTRAVENOUS at 21:07

## 2019-08-11 RX ADMIN — METOPROLOL TARTRATE 50 MG: 50 TABLET ORAL at 08:52

## 2019-08-11 RX ADMIN — HYDROCODONE BITARTRATE AND ACETAMINOPHEN 1 TABLET: 10; 325 TABLET ORAL at 16:43

## 2019-08-11 RX ADMIN — SODIUM CHLORIDE, PRESERVATIVE FREE 3 ML: 5 INJECTION INTRAVENOUS at 08:52

## 2019-08-11 RX ADMIN — APIXABAN 2.5 MG: 2.5 TABLET, FILM COATED ORAL at 08:52

## 2019-08-11 RX ADMIN — DIGOXIN 125 MCG: 125 TABLET ORAL at 14:15

## 2019-08-11 RX ADMIN — ROSUVASTATIN CALCIUM 20 MG: 20 TABLET, FILM COATED ORAL at 21:07

## 2019-08-11 NOTE — PLAN OF CARE
Problem: Patient Care Overview  Goal: Plan of Care Review  Outcome: Ongoing (interventions implemented as appropriate)   08/11/19 1700   Coping/Psychosocial   Plan of Care Reviewed With patient   Plan of Care Review   Progress improving   OTHER   Outcome Summary Patient stayed in chair all day, legs elevated. IV lasix started, good urine output. AF  on tele. PRN norco given for R shoulder pain. Safety maintained. Continue to monitor.        Problem: Cardiac: Heart Failure (Adult)  Goal: Signs and Symptoms of Listed Potential Problems Will be Absent, Minimized or Managed (Cardiac: Heart Failure)  Outcome: Ongoing (interventions implemented as appropriate)      Problem: Fall Risk (Adult)  Goal: Identify Related Risk Factors and Signs and Symptoms  Outcome: Ongoing (interventions implemented as appropriate)    Goal: Absence of Fall  Outcome: Ongoing (interventions implemented as appropriate)      Problem: VTE, DVT and PE (Adult)  Goal: Signs and Symptoms of Listed Potential Problems Will be Absent, Minimized or Managed (VTE, DVT and PE)  Outcome: Ongoing (interventions implemented as appropriate)

## 2019-08-11 NOTE — PROGRESS NOTES
"[facility], [facility] Progress Note     LOS: 5 days   Patient Care Team:  RHETT Mcgee MD as PCP - General (Family Medicine)    Chief Complaint:  PE/DVT    Subjective     LE edema.  No SOB or CP.    Review of Systems:   A 10-point review of systems is obtained and negative except for otherwise mentioned above.    Objective     Vital Sign Min/Max for last 24 hours  Temp  Min: 98.1 °F (36.7 °C)  Max: 99 °F (37.2 °C)   BP  Min: 90/60  Max: 105/50   Pulse  Min: 80  Max: 109   Resp  Min: 18  Max: 18   SpO2  Min: 94 %  Max: 98 %   No Data Recorded   Weight  Min: 108 kg (238 lb)  Max: 108 kg (238 lb)     Flowsheet Rows      First Filed Value   Admission Height  172.7 cm (68\") Documented at 08/05/2019 1415   Admission Weight  107 kg (235 lb 9.6 oz) Documented at 08/05/2019 1415          Physical Exam:   General Appearance: alert, appears stated age and cooperative  Lungs: clear to auscultation, respirations regular, respirations even and respirations unlabored  Heart: regular rhythm & normal rate, normal S1, S2, no murmur, no gallop, no rub and no click  Extremities: 3+ pitting edema BLE     Results Review:     I reviewed the patient's new clinical results.    Results from last 7 days   Lab Units 08/05/19  1640   WBC 10*3/mm3 9.53   HEMOGLOBIN g/dL 12.3*   HEMATOCRIT % 38.2*   PLATELETS 10*3/mm3 103*     Results from last 7 days   Lab Units 08/11/19  0636   SODIUM mmol/L 131*   POTASSIUM mmol/L 4.4   CHLORIDE mmol/L 95*   CO2 mmol/L 29.0   BUN mg/dL 20   CREATININE mg/dL 0.64   GLUCOSE mg/dL 111*   CALCIUM mg/dL 8.5     Results from last 7 days   Lab Units 08/11/19  0636  08/05/19  1640   SODIUM mmol/L 131*   < > 134*   POTASSIUM mmol/L 4.4   < > 3.8   CHLORIDE mmol/L 95*   < > 96*   CO2 mmol/L 29.0   < > 29.0   BUN mg/dL 20   < > 29*   CREATININE mg/dL 0.64   < > 0.94   CALCIUM mg/dL 8.5   < > 8.4   BILIRUBIN mg/dL  --   --  0.7   ALK PHOS U/L  --   --  69   ALT (SGPT) U/L  --   --  " 20   AST (SGOT) U/L  --   --  29   GLUCOSE mg/dL 111*   < > 199*    < > = values in this interval not displayed.     Results from last 7 days   Lab Units 08/05/19  1640   TROPONIN I ng/mL <0.012     Results from last 7 days   Lab Units 08/05/19  1640   TSH mIU/mL 0.807           Medication Review: yes    Assessment/Plan       PE  BLE DVT  Subdural hematoma  Severe AS  LE edema    IV Lasix for LE edema.  Await neurosurgery input for further recommendations.  Continue anticoagulation.          Val Epstein PA-C  08/11/19  12:05 PM

## 2019-08-11 NOTE — PLAN OF CARE
Problem: Patient Care Overview  Goal: Plan of Care Review  Outcome: Ongoing (interventions implemented as appropriate)   08/11/19 0237   Coping/Psychosocial   Plan of Care Reviewed With patient   Plan of Care Review   Progress improving   OTHER   Outcome Summary No c/o pain up in w/c with legs up cont with edema legs , o2 at1l/m no resp difficulty . cont to monitor .       Problem: Cardiac: Heart Failure (Adult)  Goal: Signs and Symptoms of Listed Potential Problems Will be Absent, Minimized or Managed (Cardiac: Heart Failure)  Outcome: Ongoing (interventions implemented as appropriate)      Problem: Fall Risk (Adult)  Goal: Identify Related Risk Factors and Signs and Symptoms  Outcome: Ongoing (interventions implemented as appropriate)    Goal: Absence of Fall  Outcome: Ongoing (interventions implemented as appropriate)      Problem: VTE, DVT and PE (Adult)  Goal: Signs and Symptoms of Listed Potential Problems Will be Absent, Minimized or Managed (VTE, DVT and PE)  Outcome: Ongoing (interventions implemented as appropriate)

## 2019-08-12 LAB
ANION GAP SERPL CALCULATED.3IONS-SCNC: 7 MMOL/L (ref 4–13)
BUN BLD-MCNC: 19 MG/DL (ref 5–21)
BUN/CREAT SERPL: 25.3 (ref 7–25)
CALCIUM SPEC-SCNC: 8.4 MG/DL (ref 8.4–10.4)
CHLORIDE SERPL-SCNC: 95 MMOL/L (ref 98–110)
CO2 SERPL-SCNC: 28 MMOL/L (ref 24–31)
CREAT BLD-MCNC: 0.75 MG/DL (ref 0.5–1.4)
GFR SERPL CREATININE-BSD FRML MDRD: 100 ML/MIN/1.73
GLUCOSE BLD-MCNC: 121 MG/DL (ref 70–100)
POTASSIUM BLD-SCNC: 4 MMOL/L (ref 3.5–5.3)
SODIUM BLD-SCNC: 130 MMOL/L (ref 135–145)

## 2019-08-12 PROCEDURE — 80048 BASIC METABOLIC PNL TOTAL CA: CPT | Performed by: INTERNAL MEDICINE

## 2019-08-12 PROCEDURE — 99024 POSTOP FOLLOW-UP VISIT: CPT | Performed by: NURSE PRACTITIONER

## 2019-08-12 PROCEDURE — 99233 SBSQ HOSP IP/OBS HIGH 50: CPT | Performed by: INTERNAL MEDICINE

## 2019-08-12 RX ORDER — FUROSEMIDE 40 MG/1
40 TABLET ORAL DAILY
Status: DISCONTINUED | OUTPATIENT
Start: 2019-08-12 | End: 2019-08-14 | Stop reason: HOSPADM

## 2019-08-12 RX ADMIN — Medication 9.75 MG: at 21:59

## 2019-08-12 RX ADMIN — SODIUM CHLORIDE, PRESERVATIVE FREE 3 ML: 5 INJECTION INTRAVENOUS at 21:19

## 2019-08-12 RX ADMIN — HYDROCODONE BITARTRATE AND ACETAMINOPHEN 1 TABLET: 10; 325 TABLET ORAL at 03:41

## 2019-08-12 RX ADMIN — METOPROLOL TARTRATE 50 MG: 50 TABLET ORAL at 21:18

## 2019-08-12 RX ADMIN — FUROSEMIDE 40 MG: 40 TABLET ORAL at 09:38

## 2019-08-12 RX ADMIN — ROSUVASTATIN CALCIUM 20 MG: 20 TABLET, FILM COATED ORAL at 21:18

## 2019-08-12 RX ADMIN — LISINOPRIL 5 MG: 5 TABLET ORAL at 09:38

## 2019-08-12 RX ADMIN — HYDROCODONE BITARTRATE AND ACETAMINOPHEN 1 TABLET: 10; 325 TABLET ORAL at 14:04

## 2019-08-12 RX ADMIN — HYDROCODONE BITARTRATE AND ACETAMINOPHEN 1 TABLET: 10; 325 TABLET ORAL at 22:01

## 2019-08-12 RX ADMIN — TAMOXIFEN CITRATE 20 MG: 20 TABLET ORAL at 09:39

## 2019-08-12 RX ADMIN — METOPROLOL TARTRATE 50 MG: 50 TABLET ORAL at 09:38

## 2019-08-12 RX ADMIN — APIXABAN 5 MG: 5 TABLET, FILM COATED ORAL at 21:18

## 2019-08-12 RX ADMIN — DIGOXIN 125 MCG: 125 TABLET ORAL at 11:47

## 2019-08-12 RX ADMIN — SODIUM CHLORIDE, PRESERVATIVE FREE 3 ML: 5 INJECTION INTRAVENOUS at 09:39

## 2019-08-12 RX ADMIN — APIXABAN 5 MG: 5 TABLET, FILM COATED ORAL at 09:38

## 2019-08-12 NOTE — PROGRESS NOTES
Continued Stay Note   [place]     Patient Name: Madi Mancini  MRN: [id number]  Today's Date: 8/12/2019    Admit Date: 8/5/2019    Discharge Plan     Row Name 08/12/19 0817       Plan    Plan  [facility]    Patient/Family in Agreement with Plan  yes    Plan Comments  Pt dc plan continues to be to dc to [facility].  SW will follow.  BREANNA Boswell.        Discharge Codes    No documentation.             BREANNA Sargent

## 2019-08-12 NOTE — PROGRESS NOTES
"Madi Mancini  79 y.o.      Chief complaint:   No complaints    Subjective  No events    Temp:  [97.8 °F (36.6 °C)-98.5 °F (36.9 °C)] 97.9 °F (36.6 °C)  Heart Rate:  [74-99] 81  Resp:  [18] 18  BP: ()/(58-93) 103/70      Objective:  General Appearance:  Comfortable, well-appearing, in no acute distress and not in pain.    Vital signs: (most recent): Blood pressure 103/70, pulse 81, temperature 97.9 °F (36.6 °C), temperature source Oral, resp. rate 18, height 172.7 cm (67.99\"), weight 107 kg (236 lb 4 oz), SpO2 98 %.  Vital signs are normal.  No fever.    Output: Producing urine.    HEENT: Normal HEENT exam.    Lungs:  Normal effort and normal respiratory rate.  He is not in respiratory distress.  (Patient has oxygen on per nasal cannula)  Heart: Normal rate.  Regular rhythm.    Chest: Symmetric chest wall expansion.   Extremities: There is local swelling.    Skin:  Warm and dry.    Abdomen: Abdomen is soft.  Bowel sounds are normal.     Pulses: Distal pulses are intact.        Neurologic Exam     Mental Status   Oriented to person, place, and time.   Attention: normal. Concentration: normal.   Speech: speech is normal   Level of consciousness: alert    Cranial Nerves   Cranial nerves II through XII intact.     Motor Exam   Muscle bulk: normal    Strength   Strength 5/5 throughout. Limited use of right arm due to right shoulder pain     Sensory Exam   Light touch normal.         CHF (congestive heart failure) (CMS/[facility])    Congestive cardiac failure (CMS/[facility])      Lab Results (last 24 hours)     Procedure Component Value Units Date/Time    Basic Metabolic Panel [[id number]]  (Abnormal) Collected:  08/12/19 0415    Specimen:  Blood Updated:  08/12/19 0521     Glucose 121 mg/dL      BUN 19 mg/dL      Creatinine 0.75 mg/dL      Sodium 130 mmol/L      Potassium 4.0 mmol/L      Chloride 95 mmol/L      CO2 28.0 mmol/L      Calcium 8.4 mg/dL      eGFR Non African Amer 100 mL/min/1.73      BUN/Creatinine Ratio 25.3     " Anion Gap 7.0 mmol/L     Narrative:       GFR Normal >60  Chronic Kidney Disease <60  Kidney Failure <15              Plan:   Patient doing well.  Continue with cardiology care.  Patient will need CT scan of the head prior to leaving hospital.    Marco A Maya, APRN

## 2019-08-12 NOTE — PLAN OF CARE
Problem: Patient Care Overview  Goal: Plan of Care Review  Outcome: Ongoing (interventions implemented as appropriate)   08/12/19 5998   Plan of Care Review   Progress improving   OTHER   Outcome Summary PO intake is good, 96% avg of 7 meals. Noted for 4+ edema to BLE; 1500mL fluid restriction. Discharge planning underway for [facility], will continue to follow.

## 2019-08-12 NOTE — PLAN OF CARE
Problem: Patient Care Overview  Goal: Plan of Care Review  Outcome: Ongoing (interventions implemented as appropriate)   08/12/19 1542   Coping/Psychosocial   Plan of Care Reviewed With patient   Plan of Care Review   Progress no change   OTHER   Outcome Summary pt co pain, medication given. Lasix changed to PO. edema to lower ext. may go to [facility] in am. cont to monitor.     Goal: Individualization and Mutuality  Outcome: Ongoing (interventions implemented as appropriate)    Goal: Discharge Needs Assessment  Outcome: Ongoing (interventions implemented as appropriate)    Goal: Interprofessional Rounds/Family Conf  Outcome: Ongoing (interventions implemented as appropriate)      Problem: Cardiac: Heart Failure (Adult)  Goal: Signs and Symptoms of Listed Potential Problems Will be Absent, Minimized or Managed (Cardiac: Heart Failure)  Outcome: Ongoing (interventions implemented as appropriate)      Problem: Fall Risk (Adult)  Goal: Identify Related Risk Factors and Signs and Symptoms  Outcome: Ongoing (interventions implemented as appropriate)    Goal: Absence of Fall  Outcome: Ongoing (interventions implemented as appropriate)      Problem: VTE, DVT and PE (Adult)  Goal: Signs and Symptoms of Listed Potential Problems Will be Absent, Minimized or Managed (VTE, DVT and PE)  Outcome: Ongoing (interventions implemented as appropriate)

## 2019-08-12 NOTE — PROGRESS NOTES
LOS: 6 days   Patient Care Team:  RHETT Mcgee MD as PCP - General (Family Medicine)    Chief Complaint:   CHF (congestive heart failure) (CMS/HCC)    Congestive cardiac failure (CMS/HCC)    Shortness of breath    Subjective    Overall feeling better  No chest pain  No palpitation  No excessive shortness of breath  Bipedal edema persists  No new complaints  Sitting up eating breakfast    Interval History: Improved overall marginally    Telemetry: no malignant arrhythmia. No significant pauses.    Review of Systems     Constitutional: No chills   Has fatigue   No fever.     HENT: Negative.    Eyes: Negative.      Respiratory: Negative for cough,   No chest wall soreness,   Shortness of breath,   no wheezing, no stridor.      Cardiovascular: Shortness of breath and massive bipedal edema  Gastrointestinal: Negative for abdominal distention,  No abdominal pain,   No blood in stool,   No constipation,   No diarrhea,   No nausea   No vomiting.     Endocrine: Negative.    Genitourinary: Negative for difficulty urinating, dysuria, flank pain and hematuria.     Musculoskeletal: Negative.    Skin: Negative for rash and wound.   Allergic/Immunologic: Negative.      Neurological: Negative for dizziness, syncope, weakness,   No light-headedness  No  headaches.     Hematological: Does not bruise/bleed easily.     Psychiatric/Behavioral: Negative for agitation or behavioral problems,   No confusion,   the patient is  nervous/anxious.       History:   Past Medical History:   Diagnosis Date   • Atrial fibrillation (CMS/HCC)    • Elevated cholesterol    • Hypertension      Past Surgical History:   Procedure Laterality Date   • APPENDECTOMY     • CRANIOTOMY FOR SUBDURAL HEMATOMA Bilateral 6/16/2019    Procedure: CRANIOTOMY FOR SUBDURAL HEMATOMA;  Surgeon: Galen Bhat MD;  Location: [facility];  Service: Neurosurgery   • HEMORROIDECTOMY     • UMBILICAL HERNIA REPAIR       Social History     Socioeconomic History   •  Marital status:      Spouse name: Not on file   • Number of children: Not on file   • Years of education: Not on file   • Highest education level: Not on file   Tobacco Use   • Smoking status: Former Smoker   • Smokeless tobacco: Former User     Types: Chew     Quit date: 2009   • Tobacco comment: quit at age 20   Substance and Sexual Activity   • Alcohol use: No     Frequency: Never   • Drug use: No   • Sexual activity: Defer     Family History   Problem Relation Age of Onset   • Cancer Mother    • Cancer Father    • No Known Problems Sister    • No Known Problems Brother    • COPD Brother    • Cancer Brother        Labs:  WBC No results found for: WBC   HGB No results found for: HGB   HCT No results found for: HCT   Platelets No results found for: PLT   MCV No results found for: MCV     Results from last 7 days   Lab Units 08/12/19  0415 08/11/19  0636 08/10/19  0346   SODIUM mmol/L 130* 131* 131*   POTASSIUM mmol/L 4.0 4.4 4.2   CHLORIDE mmol/L 95* 95* 94*   CO2 mmol/L 28.0 29.0 29.0   BUN mg/dL 19 20 19   CREATININE mg/dL 0.75 0.64 0.75   CALCIUM mg/dL 8.4 8.5 8.5   GLUCOSE mg/dL 121* 111* 112*     Lab Results   Component Value Date    TROPONINI <0.012 08/05/2019     PT/INR:    No results found for: PROTIME/  No results found for: INR    Imaging Results (last 72 hours)     Procedure Component Value Units Date/Time    US Venous Doppler Lower Extremity Bilateral (duplex) [[id number]] Updated:  08/06/19 0946    CT Angiogram Chest With Contrast [[id number]] Collected:  08/06/19 0043     Updated:  08/06/19 0056    Narrative:       EXAM: CT ANGIOGRAM CHEST W CONTRAST- - 8/5/2019 9:38 PM CDT     HISTORY: Dyspnea, cardiac origin suspected. Bilateral lower extremity  edema.     COMPARISON: None.      DOSE LENGTH PRODUCT: 529 mGy cm. Automatic exposure control was utilized  to make radiation dose as low as reasonably achievable.     TECHNIQUE: Enhanced axial CT images obtained with multiplanar reformats.  3D  postprocessing, including MIPs, performed and images saved to PACS.     FINDINGS:   AIRWAYS/PULMONARY PARENCHYMA: The central airways are midline and  patent. Bibasilar opacities, mildly nodular at the left lower lobe Small  bilateral pleural effusions. No pneumothorax.     VESSELS: Contrast bolus is adequate. There are numerous bilateral  pulmonary emboli involving segmental and subsegmental pulmonary  arteries. Pulmonary artery is enlarged measuring 3.7 cm. The  intraventricular septum appears flattened, concerning for right heart  strain.      CARDIAC:  Normal heart size.  No pericardial effusion.  Scattered  coronary artery calcifications.     MEDIASTINUM: There is no mediastinal or hilar lymphadenopathy by CT size  criteria. Esophagus has normal coarse, caliber and wall thickness.     EXTRATHORACIC: Calcified thyroid nodule projecting inferior to the  thyroid, difficult to measure due to streak artifact from vascular  contrast. The extrathoracic soft tissues are within normal limits.      INCLUDED UPPER ABDOMEN: The visualized portions of the upper abdomen are  within normal limits. Elevation of the right hemidiaphragm.     OSSEOUS: Diffusely heterogeneous bone mineralization. This could be seen  with osteopenia, but infiltrative process is not excluded.          Impression:       1. Positive for pulmonary emboli. Findings concerning for right heart  strain and pulmonary hypertension.  2. Bibasilar opacities, mildly nodular the left lower lobe. This could  represent evolving infarct or infection. Recommend follow-up after  treatment to evaluate for resolution.   3. Diffusely heterogeneous bone mineralization, this could be due to  osteopenia, other metabolic process or multiple myeloma. Recommend  correlation with patient history.  4. Calcified thyroid nodule. If not previously performed an outside  facility, dedicated thyroid ultrasound could be considered.  5. Scattered coronary artery calcifications.      Results communicated by telephone to the patient's nurse Saritha Darling  at 12:51 AM on 08/06/2019. She reports that the managing clinician is  aware of the pulmonary emboli and right heart strain.     Preliminary interpretation performed by Elio and I agree with the  preliminary report.     This report was finalized on 08/06/2019 00:53 by Dr Martina Ojeda MD.          Objective     Allergies   Allergen Reactions   • Penicillins Other (See Comments)     Causes mouth to be sore       Medication Review: Performed  Current Facility-Administered Medications   Medication Dose Route Frequency Provider Last Rate Last Dose   • acetaminophen (TYLENOL) tablet 650 mg  650 mg Oral Q4H PRN Jasen Holcomb MD       • apixaban (ELIQUIS) tablet 5 mg  5 mg Oral Q12H Jasen Holcomb MD   5 mg at 08/12/19 0938   • bisacodyl (DULCOLAX) suppository 10 mg  10 mg Rectal Daily PRN Jasen Holcomb MD   10 mg at 08/09/19 0624   • digoxin (LANOXIN) tablet 125 mcg  125 mcg Oral Daily Jasen Holcomb MD   125 mcg at 08/12/19 1147   • furosemide (LASIX) tablet 40 mg  40 mg Oral Daily Jasen Holcomb MD   40 mg at 08/12/19 0938   • HYDROcodone-acetaminophen (NORCO)  MG per tablet 1 tablet  1 tablet Oral Q6H PRN Jasen Holcomb MD   1 tablet at 08/12/19 1404   • lisinopril (PRINIVIL,ZESTRIL) tablet 5 mg  5 mg Oral Q24H Jasen Holcomb MD   5 mg at 08/12/19 0938   • magnesium hydroxide (MILK OF MAGNESIA) suspension 2400 mg/10mL 10 mL  10 mL Oral Daily PRN Jasen Holcomb MD   10 mL at 08/09/19 2142   • melatonin tablet 9.75 mg  9.75 mg Oral Nightly PRN Shaheen Aj MD   9.75 mg at 08/11/19 2107   • metoprolol tartrate (LOPRESSOR) tablet 50 mg  50 mg Oral Q12H Jasen Holcomb MD   50 mg at 08/12/19 0938   • rosuvastatin (CRESTOR) tablet 20 mg  20 mg Oral Nightly Jasen Holcomb MD   20 mg at 08/11/19 2107   • sodium chloride 0.9 % flush 3 mL  3 mL Intravenous Q12H Jasen Holcomb MD   3 mL at 08/12/19 0928   • sodium chloride 0.9 % flush 3-10 mL  3-10 mL  "Intravenous PRN Jasen Holcomb MD       • tamoxifen (NOLVADEX) chemo tablet 20 mg  20 mg Oral Daily Jasen Holcomb MD   20 mg at 08/12/19 0939       Vital Sign Min/Max for last 24 hours  Temp  Min: 97.8 °F (36.6 °C)  Max: 98.5 °F (36.9 °C)   BP  Min: 90/66  Max: 118/71   Pulse  Min: 75  Max: 99   Resp  Min: 18  Max: 18   SpO2  Min: 91 %  Max: 98 %   No Data Recorded   Weight  Min: 107 kg (236 lb 4 oz)  Max: 107 kg (236 lb 4 oz)     Flowsheet Rows      First Filed Value   Admission Height  172.7 cm (68\") Documented at 08/05/2019 1415   Admission Weight  107 kg (235 lb 9.6 oz) Documented at 08/05/2019 1415          Results for orders placed during the hospital encounter of 08/05/19   Adult Transthoracic Echo Limited W/ Cont if Necessary Per Protocol    Narrative · Estimated EF = 65%.  · Left ventricular wall thickness is consistent with mild concentric   hypertrophy.  · Severe aortic valve stenosis is present.  · Mild pulmonary hypertension is present.  · Normal right ventricular cavity size, wall thickness, systolic function   and septal motion noted.  · RV to LV ratio <1          Physical Exam:    General Appearance: Awake, alert, in no acute distress  Eyes: Pupils equal and reactive    Ears: Appear intact with no abnormalities noted  Nose: Nares normal, no drainage  Neck: supple, trachea midline, no carotid bruit and no JVD  Back: no kyphosis present,    Lungs: respirations regular, respirations even and respirations unlabored    Heart: normal S1, S2,  2/6 pansystolic murmur in the left sternal border,  no rub and no click    Abdomen: normal bowel sounds, no tenderness   Skin: no bleeding, bruising or rash  Extremities: no cyanosis  Marginal improvement in lower extremity pedal edema  Psychiatric/Behavioral: Negative for agitation, behavioral problems, confusion, the patient does  appear to be nervous/anxious.          Results Review:   I reviewed the patient's new clinical results.  I reviewed the patient's new " imaging results and agree with the interpretation.  I reviewed the patient's other test results and agree with the interpretation  I personally viewed and interpreted the patient's EKG/Telemetry data  Discussed with patient    Reviewed available prior notes, consults, prior visits, laboratory findings, radiology and cardiology relevant reports. Updated chart as applicable. I have reviewed the patient's medical history in detail and updated the computerized patient record as relevant.      Updated patient regarding any new or relevant abnormalities on review of records or any new findings on physical exam. Mentioned to patient about purpose of visit and desirable health short and long term goals and objectives.     Assessment/Plan       CHF (congestive heart failure) (CMS/[facility])    Congestive cardiac failure (CMS/[facility])  Pulmonary embolism  Bilateral lower extremity deep vein thrombosis  Advanced age  Subdural hematoma with possible recent bleeding  Severe aortic stenosis  Hyponatremia  Volume overload  Pedal edema        Plan      Overall improved  CT scan of brain again tomorrow  Increase Eliquis from 2.5 mg p.o. twice daily to 5 mg p.o. twice daily  Discussed with his daughter Maria Elena on the phone in detail.  Will recommend  to discuss directly with his daughter Maria Elena  Continue other current medications  Appreciate neurosurgical help and help from other disciplines    Monitor kidney functions and sodium level.  Part of hyponatremia likely due to SIADH    Jasen Holcomb MD  08/12/19  6:44 PM    EMR Dragon/Transcription was used to dictate part of this note

## 2019-08-12 NOTE — PLAN OF CARE
Problem: Patient Care Overview  Goal: Plan of Care Review  Outcome: Ongoing (interventions implemented as appropriate)   08/12/19 0247   Coping/Psychosocial   Plan of Care Reviewed With patient   Plan of Care Review   Progress improving   OTHER   Outcome Summary PT UP W/C AND CHAIR , CONT WITH EDEMA OF EXT O2AT2L/M NO RESP DIFFICULTY , NO PAIN MEDS REQUESTED , CONT TO MONITOR .        Problem: Cardiac: Heart Failure (Adult)  Goal: Signs and Symptoms of Listed Potential Problems Will be Absent, Minimized or Managed (Cardiac: Heart Failure)  Outcome: Ongoing (interventions implemented as appropriate)      Problem: Fall Risk (Adult)  Goal: Identify Related Risk Factors and Signs and Symptoms  Outcome: Ongoing (interventions implemented as appropriate)    Goal: Absence of Fall  Outcome: Ongoing (interventions implemented as appropriate)      Problem: VTE, DVT and PE (Adult)  Goal: Signs and Symptoms of Listed Potential Problems Will be Absent, Minimized or Managed (VTE, DVT and PE)  Outcome: Ongoing (interventions implemented as appropriate)

## 2019-08-13 ENCOUNTER — APPOINTMENT (OUTPATIENT)
Dept: CT IMAGING | Facility: HOSPITAL | Age: 79
End: 2019-08-13

## 2019-08-13 LAB
ANION GAP SERPL CALCULATED.3IONS-SCNC: 9 MMOL/L (ref 4–13)
BUN BLD-MCNC: 21 MG/DL (ref 5–21)
BUN/CREAT SERPL: 27.6 (ref 7–25)
CALCIUM SPEC-SCNC: 8.7 MG/DL (ref 8.4–10.4)
CHLORIDE SERPL-SCNC: 96 MMOL/L (ref 98–110)
CO2 SERPL-SCNC: 28 MMOL/L (ref 24–31)
CREAT BLD-MCNC: 0.76 MG/DL (ref 0.5–1.4)
GFR SERPL CREATININE-BSD FRML MDRD: 99 ML/MIN/1.73
GLUCOSE BLD-MCNC: 113 MG/DL (ref 70–100)
POTASSIUM BLD-SCNC: 4.9 MMOL/L (ref 3.5–5.3)
SODIUM BLD-SCNC: 133 MMOL/L (ref 135–145)

## 2019-08-13 PROCEDURE — 99233 SBSQ HOSP IP/OBS HIGH 50: CPT | Performed by: INTERNAL MEDICINE

## 2019-08-13 PROCEDURE — 80048 BASIC METABOLIC PNL TOTAL CA: CPT | Performed by: INTERNAL MEDICINE

## 2019-08-13 PROCEDURE — 70450 CT HEAD/BRAIN W/O DYE: CPT

## 2019-08-13 RX ORDER — LANOLIN ALCOHOL/MO/W.PET/CERES
9 CREAM (GRAM) TOPICAL NIGHTLY PRN
Status: DISCONTINUED | OUTPATIENT
Start: 2019-08-13 | End: 2019-08-14 | Stop reason: HOSPADM

## 2019-08-13 RX ADMIN — FUROSEMIDE 40 MG: 40 TABLET ORAL at 09:02

## 2019-08-13 RX ADMIN — ROSUVASTATIN CALCIUM 20 MG: 20 TABLET, FILM COATED ORAL at 21:39

## 2019-08-13 RX ADMIN — SODIUM CHLORIDE, PRESERVATIVE FREE 3 ML: 5 INJECTION INTRAVENOUS at 21:40

## 2019-08-13 RX ADMIN — TAMOXIFEN CITRATE 20 MG: 20 TABLET ORAL at 09:01

## 2019-08-13 RX ADMIN — LISINOPRIL 5 MG: 5 TABLET ORAL at 09:02

## 2019-08-13 RX ADMIN — APIXABAN 5 MG: 5 TABLET, FILM COATED ORAL at 09:02

## 2019-08-13 RX ADMIN — APIXABAN 5 MG: 5 TABLET, FILM COATED ORAL at 21:39

## 2019-08-13 RX ADMIN — METOPROLOL TARTRATE 50 MG: 50 TABLET ORAL at 09:01

## 2019-08-13 RX ADMIN — DIGOXIN 125 MCG: 125 TABLET ORAL at 12:03

## 2019-08-13 RX ADMIN — METOPROLOL TARTRATE 50 MG: 50 TABLET ORAL at 21:39

## 2019-08-13 RX ADMIN — HYDROCODONE BITARTRATE AND ACETAMINOPHEN 1 TABLET: 10; 325 TABLET ORAL at 14:06

## 2019-08-13 RX ADMIN — HYDROCODONE BITARTRATE AND ACETAMINOPHEN 1 TABLET: 10; 325 TABLET ORAL at 07:22

## 2019-08-13 RX ADMIN — HYDROCODONE BITARTRATE AND ACETAMINOPHEN 1 TABLET: 10; 325 TABLET ORAL at 21:39

## 2019-08-13 RX ADMIN — Medication 9 MG: at 21:39

## 2019-08-13 RX ADMIN — SODIUM CHLORIDE, PRESERVATIVE FREE 3 ML: 5 INJECTION INTRAVENOUS at 09:02

## 2019-08-13 NOTE — DISCHARGE PLACEMENT REQUEST
"From:  Daniela Young, BSW  [phone]    Jori Peacock (79 y.o. Male)     Date of Birth Social Security Number Address Home Phone MRN    1940  [id number] [address] [phone] [id number]    Rastafarian Marital Status          Judaism        Admission Date Admission Type Admitting Provider Attending Provider Department, Room/Bed    8/5/19 Urgent Jasen Holcomb MD Bose, Sanjay, MD [facility] 4B, 404/1    Discharge Date Discharge Disposition Discharge Destination                       Attending Provider:  Jasen Holcomb MD    Allergies:  Penicillins    Isolation:  None   Infection:  None   Code Status:  CPR    Ht:  172.7 cm (67.99\")   Wt:  107 kg (236 lb)    Admission Cmt:  None   Principal Problem:  None                Active Insurance as of 8/5/2019     Primary Coverage     Payor Plan Insurance Group Employer/Plan Group    MEDICARE MEDICARE A & B      Payor Plan Address Payor Plan Phone Number Payor Plan Fax Number Effective Dates    [address] [phone]  3/1/2004 - None Entered    [facility] [id number]       Subscriber Name Subscriber Birth Date Member ID       JORI PEACOCK 1940 [id number]           Secondary Coverage     Payor Plan Insurance Group Employer/Plan Group    ANTHEM BLUE CROSS ANTHEM BLUE CROSS BLUE SHIELD PPO [id number]     Payor Plan Address Payor Plan Phone Number Payor Plan Fax Number Effective Dates    [address] [phone]  1/1/2019 - None Entered    [facility] [id number]       Subscriber Name Subscriber Birth Date Member ID       JORI PEACOCK 1940 [id number]                 Emergency Contacts      (Rel.) Home Phone Work Phone Mobile Phone    DAMON MURDOCK (Daughter) [phone] -- --    AYUSH JORI JR (Son) -- -- [phone]    Ayush Jc (Brother) -- -- [phone]               History & Physical      Jasen Holcomb MD at 8/5/2019  4:04 PM          H&P reviewed. The patient was examined and there are no changes " to the H&P.          Electronically signed by Jasen Holcomb MD at 8/5/2019  4:05 PM   Source Note             Madi Mancini  [id number]  1940  79 y.o.  male    Referring Provider: RHETT Mcgee MD    Reason for  Visit: Here for routine follow up   congestive heart failure   subdural hematoma 5/8/19  Massive pedal edema    Subjective    Moderate chronic exertional shortness of breath on exertion relieved with rest  No significant cough or wheezing  Going on for several months or longer    No palpitations  No associated chest pain  Massive pedal edema    No fever or chills  No significant expectoration    No hemoptysis  No presyncope or syncope       History of present illness:  Madi Mancini is a 79 y.o. yo male with history of subdural hematoma s/p craniotomy  who presents today for   Chief Complaint   Patient presents with   • Atrial Fibrillation     6 wk d/c   • Shortness of Breath   • Edema   .    History  Past Medical History:   Diagnosis Date   • Atrial fibrillation (CMS/HCC)    • Elevated cholesterol    • Hypertension    ,   Past Surgical History:   Procedure Laterality Date   • APPENDECTOMY     • CRANIOTOMY FOR SUBDURAL HEMATOMA Bilateral 6/16/2019    Procedure: CRANIOTOMY FOR SUBDURAL HEMATOMA;  Surgeon: Galen Bhat MD;  Location: [facility];  Service: Neurosurgery   • HEMORROIDECTOMY     • UMBILICAL HERNIA REPAIR     ,   Family History   Problem Relation Age of Onset   • Cancer Mother    • Cancer Father    • No Known Problems Sister    • No Known Problems Brother    • COPD Brother    • Cancer Brother    ,   Social History     Tobacco Use   • Smoking status: Former Smoker   • Smokeless tobacco: Former User     Types: Chew     Quit date: 2009   • Tobacco comment: quit at age 20   Substance Use Topics   • Alcohol use: No     Frequency: Never   • Drug use: No   ,     Medications  Current Outpatient Medications   Medication Sig Dispense Refill   • acetaminophen (TYLENOL) 325 MG tablet Take 2  "tablets by mouth Every 4 (Four) Hours As Needed for Mild Pain  or Moderate Pain . 100 tablet 2   • amLODIPine (NORVASC) 5 MG tablet      • digoxin (LANOXIN) 125 MCG tablet Take 1 tablet by mouth Daily.     • HYDROcodone-acetaminophen (NORCO)  MG per tablet Take 1 tablet by mouth Every 6 (Six) Hours As Needed for Moderate Pain .     • lisinopril (PRINIVIL,ZESTRIL) 20 MG tablet      • metoprolol tartrate (LOPRESSOR) 50 MG tablet Take 1 tablet by mouth Every 12 (Twelve) Hours.     • O2 (OXYGEN) Inhale 4 L/min 1 (One) Time.     • rosuvastatin (CRESTOR) 20 MG tablet Take 20 mg by mouth Daily.     • tamoxifen (NOLVADEX) 20 MG chemo tablet Take 20 mg by mouth Daily.       No current facility-administered medications for this visit.        Allergies:  Penicillins    Review of Systems  Review of Systems   Constitution: Positive for weakness and malaise/fatigue.   HENT: Negative.    Eyes: Negative.    Cardiovascular: Positive for dyspnea on exertion and leg swelling. Negative for chest pain, claudication, cyanosis, irregular heartbeat, near-syncope, orthopnea, palpitations, paroxysmal nocturnal dyspnea and syncope.   Respiratory: Negative.    Endocrine: Negative.    Hematologic/Lymphatic: Negative.    Skin: Negative.    Musculoskeletal: Positive for arthritis, back pain and joint pain.   Gastrointestinal: Negative for anorexia.   Genitourinary: Negative.    Psychiatric/Behavioral: Negative.        Objective     Physical Exam:  /80   Pulse 96   Ht 172.7 cm (68\")   Wt 108 kg (237 lb)   SpO2 98%   BMI 36.04 kg/m²      Physical Exam   Constitutional: He appears well-developed.   HENT:   Head: Normocephalic.   Neck: Normal carotid pulses and no JVD present. No tracheal tenderness present. Carotid bruit is not present. No tracheal deviation and no edema present.   Cardiovascular: Regular rhythm and normal pulses.   Murmur heard.   Systolic murmur is present with a grade of 3/6.  Pulmonary/Chest: Effort normal. No " stridor.   Abdominal: Soft.     Vascular Status -  His right foot exhibits abnormal foot edema. His left foot exhibits abnormal foot edema.  Neurological: He is alert. He has normal strength. No cranial nerve deficit or sensory deficit.   Skin: Skin is warm.   Psychiatric: He has a normal mood and affect. His speech is normal and behavior is normal.       Results Review:    Advisory     Will see the patient soon per appointment or sooner if required  Patient advised in advance that there may be a longer wait time next follow up with  The patient given the option to see another provider that the patient declined          ECG 12 Lead  Date/Time: 8/5/2019 10:47 AM  Performed by: Jasen Holcomb MD  Authorized by: Jasen Holcomb MD   Comparison: compared with previous ECG from 6/22/2019  Comparison to previous ECG: Ventricular rate decreased from  132  to  91 beats per minute    Rhythm: atrial fibrillation  Rate: normal  Conduction: conduction normal  Q waves: V1, V2 and V3    QRS axis: right  Other findings: non-specific ST-T wave changes    Clinical impression: abnormal EKG            Assessment/Plan   Madi was seen today for atrial fibrillation, shortness of breath and edema.    Diagnoses and all orders for this visit:    Former smoker    BMI 36.0-36.9,adult    Bilateral subdural hematomas (CMS/HCC)    Atrial fibrillation, chronic (CMS/HCC)    Essential hypertension    Pedal edema    Other orders  -     ECG 12 Lead           Plan      Admit and start IV diuretic therapy    ____________________________________________________________________________________________________________________________________________  Health maintenance and recommendations      Low salt/ HTN/ Heart healthy carbohydrate restricted cardiac diet   The patient is advised to reduce or avoid caffeine or other cardiac stimulants.     Minimize or avoid  NSAID-type medications        Monitor for any signs of bleeding including red or dark stools.  Fall precautions.        Advised staying uptodate with immunizations per established standard guidelines.      Offered to give patient  a copy of my notes       Questions were encouraged, asked and answered to the patient's  understanding and satisfaction. Questions if any regarding current medications and side effects, need for refills and importance of compliance to medications stressed.    Reviewed available prior notes, consults, prior visits, laboratory findings, radiology and cardiology relevant reports. Updated chart as applicable. I have reviewed the patient's medical history in detail and updated the computerized patient record as relevant.      Updated patient regarding any new or relevant abnormalities on review of records or any new findings on physical exam. Mentioned to patient about purpose of visit and desirable health short and long term goals and objectives.    Primary to monitor CBC CMP Lipid panel and TSH as applicable    ___________________________________________________________________________________________________________________________________________          Return in about 6 weeks (around 9/16/2019).                  Electronically signed by Jasen Holcomb MD at 8/5/2019 10:56 AM             Jasne Holcomb MD at 8/5/2019  9:30 AM          Madi Mancini  [id number]  1940  79 y.o.  male    Referring Provider: RHETT Mcgee MD    Reason for  Visit: Here for routine follow up   congestive heart failure   subdural hematoma 5/8/19  Massive pedal edema    Subjective    Moderate chronic exertional shortness of breath on exertion relieved with rest  No significant cough or wheezing  Going on for several months or longer    No palpitations  No associated chest pain  Massive pedal edema    No fever or chills  No significant expectoration    No hemoptysis  No presyncope or syncope       History of present illness:  Madi Mancini is a 79 y.o. yo male with history of subdural hematoma s/p  craniotomy  who presents today for   Chief Complaint   Patient presents with   • Atrial Fibrillation     6 wk d/c   • Shortness of Breath   • Edema   .    History  Past Medical History:   Diagnosis Date   • Atrial fibrillation (CMS/HCC)    • Elevated cholesterol    • Hypertension    ,   Past Surgical History:   Procedure Laterality Date   • APPENDECTOMY     • CRANIOTOMY FOR SUBDURAL HEMATOMA Bilateral 6/16/2019    Procedure: CRANIOTOMY FOR SUBDURAL HEMATOMA;  Surgeon: Galen Bhat MD;  Location: [facility] OR;  Service: Neurosurgery   • HEMORROIDECTOMY     • UMBILICAL HERNIA REPAIR     ,   Family History   Problem Relation Age of Onset   • Cancer Mother    • Cancer Father    • No Known Problems Sister    • No Known Problems Brother    • COPD Brother    • Cancer Brother    ,   Social History     Tobacco Use   • Smoking status: Former Smoker   • Smokeless tobacco: Former User     Types: Chew     Quit date: 2009   • Tobacco comment: quit at age 20   Substance Use Topics   • Alcohol use: No     Frequency: Never   • Drug use: No   ,     Medications  Current Outpatient Medications   Medication Sig Dispense Refill   • acetaminophen (TYLENOL) 325 MG tablet Take 2 tablets by mouth Every 4 (Four) Hours As Needed for Mild Pain  or Moderate Pain . 100 tablet 2   • amLODIPine (NORVASC) 5 MG tablet      • digoxin (LANOXIN) 125 MCG tablet Take 1 tablet by mouth Daily.     • HYDROcodone-acetaminophen (NORCO)  MG per tablet Take 1 tablet by mouth Every 6 (Six) Hours As Needed for Moderate Pain .     • lisinopril (PRINIVIL,ZESTRIL) 20 MG tablet      • metoprolol tartrate (LOPRESSOR) 50 MG tablet Take 1 tablet by mouth Every 12 (Twelve) Hours.     • O2 (OXYGEN) Inhale 4 L/min 1 (One) Time.     • rosuvastatin (CRESTOR) 20 MG tablet Take 20 mg by mouth Daily.     • tamoxifen (NOLVADEX) 20 MG chemo tablet Take 20 mg by mouth Daily.       No current facility-administered medications for this visit.        Allergies:   "Penicillins    Review of Systems  Review of Systems   Constitution: Positive for weakness and malaise/fatigue.   HENT: Negative.    Eyes: Negative.    Cardiovascular: Positive for dyspnea on exertion and leg swelling. Negative for chest pain, claudication, cyanosis, irregular heartbeat, near-syncope, orthopnea, palpitations, paroxysmal nocturnal dyspnea and syncope.   Respiratory: Negative.    Endocrine: Negative.    Hematologic/Lymphatic: Negative.    Skin: Negative.    Musculoskeletal: Positive for arthritis, back pain and joint pain.   Gastrointestinal: Negative for anorexia.   Genitourinary: Negative.    Psychiatric/Behavioral: Negative.        Objective     Physical Exam:  /80   Pulse 96   Ht 172.7 cm (68\")   Wt 108 kg (237 lb)   SpO2 98%   BMI 36.04 kg/m²      Physical Exam   Constitutional: He appears well-developed.   HENT:   Head: Normocephalic.   Neck: Normal carotid pulses and no JVD present. No tracheal tenderness present. Carotid bruit is not present. No tracheal deviation and no edema present.   Cardiovascular: Regular rhythm and normal pulses.   Murmur heard.   Systolic murmur is present with a grade of 3/6.  Pulmonary/Chest: Effort normal. No stridor.   Abdominal: Soft.     Vascular Status -  His right foot exhibits abnormal foot edema. His left foot exhibits abnormal foot edema.  Neurological: He is alert. He has normal strength. No cranial nerve deficit or sensory deficit.   Skin: Skin is warm.   Psychiatric: He has a normal mood and affect. His speech is normal and behavior is normal.       Results Review:    Advisory     Will see the patient soon per appointment or sooner if required  Patient advised in advance that there may be a longer wait time next follow up with  The patient given the option to see another provider that the patient declined          ECG 12 Lead  Date/Time: 8/5/2019 10:47 AM  Performed by: Jasen Holcomb MD  Authorized by: Jasen Holcomb MD   Comparison: compared " with previous ECG from 6/22/2019  Comparison to previous ECG: Ventricular rate decreased from  132  to  91 beats per minute    Rhythm: atrial fibrillation  Rate: normal  Conduction: conduction normal  Q waves: V1, V2 and V3    QRS axis: right  Other findings: non-specific ST-T wave changes    Clinical impression: abnormal EKG            Assessment/Plan   Madi was seen today for atrial fibrillation, shortness of breath and edema.    Diagnoses and all orders for this visit:    Former smoker    BMI 36.0-36.9,adult    Bilateral subdural hematomas (CMS/HCC)    Atrial fibrillation, chronic (CMS/HCC)    Essential hypertension    Pedal edema    Other orders  -     ECG 12 Lead           Plan      Admit and start IV diuretic therapy    ____________________________________________________________________________________________________________________________________________  Health maintenance and recommendations      Low salt/ HTN/ Heart healthy carbohydrate restricted cardiac diet   The patient is advised to reduce or avoid caffeine or other cardiac stimulants.     Minimize or avoid  NSAID-type medications        Monitor for any signs of bleeding including red or dark stools. Fall precautions.        Advised staying uptodate with immunizations per established standard guidelines.      Offered to give patient  a copy of my notes       Questions were encouraged, asked and answered to the patient's  understanding and satisfaction. Questions if any regarding current medications and side effects, need for refills and importance of compliance to medications stressed.    Reviewed available prior notes, consults, prior visits, laboratory findings, radiology and cardiology relevant reports. Updated chart as applicable. I have reviewed the patient's medical history in detail and updated the computerized patient record as relevant.      Updated patient regarding any new or relevant abnormalities on review of records or any new  findings on physical exam. Mentioned to patient about purpose of visit and desirable health short and long term goals and objectives.    Primary to monitor CBC CMP Lipid panel and TSH as applicable    ___________________________________________________________________________________________________________________________________________          Return in about 6 weeks (around 9/16/2019).                 Electronically signed by Jasen Holcomb MD at 8/5/2019 10:56 AM       Prior to Admission Medications     Prescriptions Last Dose Informant Patient Reported? Taking?    acetaminophen (TYLENOL) 325 MG tablet Past Week Nursing Home Yes Yes    Take 650 mg by mouth Every 4 (Four) Hours As Needed for Mild Pain  or Fever.    amLODIPine (NORVASC) 5 MG tablet 8/4/2019 Nursing Home Yes Yes    Take 5 mg by mouth 2 (Two) Times a Day.    digoxin (LANOXIN) 125 MCG tablet 8/4/2019 Nursing Home No Yes    Take 1 tablet by mouth Daily.    furosemide (LASIX) 40 MG tablet 8/4/2019 Nursing Home Yes Yes    Take 40 mg by mouth 2 (Two) Times a Day.    HYDROcodone-acetaminophen (NORCO) 5-325 MG per tablet Past Week Nursing Home Yes Yes    Take 1 tablet by mouth Every 6 (Six) Hours As Needed for Moderate Pain .    magnesium hydroxide (MILK OF MAGNESIA) 400 MG/5ML suspension Past Month Nursing Home Yes Yes    Take 30 mL by mouth Daily As Needed for Constipation.    melatonin 5 MG tablet tablet 8/4/2019 Nursing Home Yes Yes    Take 10 mg by mouth Every Night.    metoprolol tartrate (LOPRESSOR) 50 MG tablet 8/4/2019 Nursing Home No Yes    Take 1 tablet by mouth Every 12 (Twelve) Hours.    polyethylene glycol (MIRALAX) packet Past Month Nursing Home Yes Yes    Take 17 g by mouth 2 (Two) Times a Day As Needed (constipation).    rosuvastatin (CRESTOR) 20 MG tablet 8/4/2019 Nursing Home Yes Yes    Take 20 mg by mouth Every Night.    senna (SENOKOT) 8.6 MG tablet tablet 8/4/2019 Nursing Home Yes Yes    Take 1 tablet by mouth Daily.    tamoxifen  (NOLVADEX) 10 MG tablet 8/4/2019 Nursing Home Yes Yes    Take 20 mg by mouth Daily.    vitamin D (ERGOCALCIFEROL) 36739 units capsule capsule Past Week Nursing Home Yes Yes    Take 50,000 Units by mouth 1 (One) Time Per Week.          Hospital Medications (active)       Dose Frequency Start End    acetaminophen (TYLENOL) tablet 650 mg 650 mg Every 4 Hours PRN 8/5/2019 6/23/2020    Sig - Route: Take 2 tablets by mouth Every 4 (Four) Hours As Needed for Mild Pain  or Moderate Pain . - Oral    apixaban (ELIQUIS) tablet 5 mg 5 mg Every 12 Hours Scheduled 8/12/2019     Sig - Route: Take 1 tablet by mouth Every 12 (Twelve) Hours. - Oral    bisacodyl (DULCOLAX) suppository 10 mg 10 mg Daily PRN 8/6/2019     Sig - Route: Insert 1 suppository into the rectum Daily As Needed for Constipation. - Rectal    digoxin (LANOXIN) tablet 125 mcg 125 mcg Daily Digoxin 8/6/2019     Sig - Route: Take 1 tablet by mouth Daily. - Oral    furosemide (LASIX) tablet 40 mg 40 mg Daily 8/12/2019     Sig - Route: Take 1 tablet by mouth Daily. - Oral    HYDROcodone-acetaminophen (NORCO)  MG per tablet 1 tablet 1 tablet Every 6 Hours PRN 8/5/2019     Sig - Route: Take 1 tablet by mouth Every 6 (Six) Hours As Needed for Moderate Pain . - Oral    lisinopril (PRINIVIL,ZESTRIL) tablet 5 mg 5 mg Every 24 Hours Scheduled 8/5/2019     Sig - Route: Take 1 tablet by mouth Daily. - Oral    magnesium hydroxide (MILK OF MAGNESIA) suspension 2400 mg/10mL 10 mL 10 mL Daily PRN 8/9/2019     Sig - Route: Take 10 mL by mouth Daily As Needed for Constipation. - Oral    melatonin tablet 9.75 mg 9.75 mg Nightly PRN 8/7/2019 8/12/2019    Sig - Route: Take 3.25 tablets by mouth At Night As Needed for Sleep. - Oral    metoprolol tartrate (LOPRESSOR) tablet 50 mg 50 mg Every 12 Hours Scheduled 8/5/2019     Sig - Route: Take 1 tablet by mouth Every 12 (Twelve) Hours. - Oral    rosuvastatin (CRESTOR) tablet 20 mg 20 mg Nightly 8/5/2019     Sig - Route: Take 1 tablet  by mouth Every Night. - Oral    sodium chloride 0.9 % flush 3 mL 3 mL Every 12 Hours Scheduled 8/5/2019     Sig - Route: Infuse 3 mL into a venous catheter Every 12 (Twelve) Hours. - Intravenous    sodium chloride 0.9 % flush 3-10 mL 3-10 mL As Needed 8/5/2019     Sig - Route: Infuse 3-10 mL into a venous catheter As Needed for Line Care. - Intravenous    tamoxifen (NOLVADEX) chemo tablet 20 mg 20 mg Daily 8/5/2019     Sig - Route: Take 1 tablet by mouth Daily. - Oral          Operative/Procedure Notes (most recent note)     No notes of this type exist for this encounter.           Physician Progress Notes (most recent note)      Jasen Holcomb MD at 8/12/2019  8:10 AM             LOS: 6 days   Patient Care Team:  RHETT Mcgee MD as PCP - General (Family Medicine)    Chief Complaint:   CHF (congestive heart failure) (CMS/HCC)    Congestive cardiac failure (CMS/HCC)    Shortness of breath    Subjective    Overall feeling better  No chest pain  No palpitation  No excessive shortness of breath  Bipedal edema persists  No new complaints  Sitting up eating breakfast    Interval History: Improved overall marginally    Telemetry: no malignant arrhythmia. No significant pauses.    Review of Systems     Constitutional: No chills   Has fatigue   No fever.     HENT: Negative.    Eyes: Negative.      Respiratory: Negative for cough,   No chest wall soreness,   Shortness of breath,   no wheezing, no stridor.      Cardiovascular: Shortness of breath and massive bipedal edema  Gastrointestinal: Negative for abdominal distention,  No abdominal pain,   No blood in stool,   No constipation,   No diarrhea,   No nausea   No vomiting.     Endocrine: Negative.    Genitourinary: Negative for difficulty urinating, dysuria, flank pain and hematuria.     Musculoskeletal: Negative.    Skin: Negative for rash and wound.   Allergic/Immunologic: Negative.      Neurological: Negative for dizziness, syncope, weakness,   No light-headedness  No   headaches.     Hematological: Does not bruise/bleed easily.     Psychiatric/Behavioral: Negative for agitation or behavioral problems,   No confusion,   the patient is  nervous/anxious.       History:   Past Medical History:   Diagnosis Date   • Atrial fibrillation (CMS/HCC)    • Elevated cholesterol    • Hypertension      Past Surgical History:   Procedure Laterality Date   • APPENDECTOMY     • CRANIOTOMY FOR SUBDURAL HEMATOMA Bilateral 6/16/2019    Procedure: CRANIOTOMY FOR SUBDURAL HEMATOMA;  Surgeon: Galen Bhat MD;  Location: [facility];  Service: Neurosurgery   • HEMORROIDECTOMY     • UMBILICAL HERNIA REPAIR       Social History     Socioeconomic History   • Marital status:      Spouse name: Not on file   • Number of children: Not on file   • Years of education: Not on file   • Highest education level: Not on file   Tobacco Use   • Smoking status: Former Smoker   • Smokeless tobacco: Former User     Types: Chew     Quit date: 2009   • Tobacco comment: quit at age 20   Substance and Sexual Activity   • Alcohol use: No     Frequency: Never   • Drug use: No   • Sexual activity: Defer     Family History   Problem Relation Age of Onset   • Cancer Mother    • Cancer Father    • No Known Problems Sister    • No Known Problems Brother    • COPD Brother    • Cancer Brother        Labs:  WBC No results found for: WBC   HGB No results found for: HGB   HCT No results found for: HCT   Platelets No results found for: PLT   MCV No results found for: MCV     Results from last 7 days   Lab Units 08/12/19  0415 08/11/19  0636 08/10/19  0346   SODIUM mmol/L 130* 131* 131*   POTASSIUM mmol/L 4.0 4.4 4.2   CHLORIDE mmol/L 95* 95* 94*   CO2 mmol/L 28.0 29.0 29.0   BUN mg/dL 19 20 19   CREATININE mg/dL 0.75 0.64 0.75   CALCIUM mg/dL 8.4 8.5 8.5   GLUCOSE mg/dL 121* 111* 112*     Lab Results   Component Value Date    TROPONINI <0.012 08/05/2019     PT/INR:    No results found for: PROTIME/  No results found for:  INR    Imaging Results (last 72 hours)     Procedure Component Value Units Date/Time    US Venous Doppler Lower Extremity Bilateral (duplex) [[id number]] Updated:  08/06/19 0946    CT Angiogram Chest With Contrast [[id number]] Collected:  08/06/19 0043     Updated:  08/06/19 0056    Narrative:       EXAM: CT ANGIOGRAM CHEST W CONTRAST- - 8/5/2019 9:38 PM CDT     HISTORY: Dyspnea, cardiac origin suspected. Bilateral lower extremity  edema.     COMPARISON: None.      DOSE LENGTH PRODUCT: 529 mGy cm. Automatic exposure control was utilized  to make radiation dose as low as reasonably achievable.     TECHNIQUE: Enhanced axial CT images obtained with multiplanar reformats.  3D postprocessing, including MIPs, performed and images saved to PACS.     FINDINGS:   AIRWAYS/PULMONARY PARENCHYMA: The central airways are midline and  patent. Bibasilar opacities, mildly nodular at the left lower lobe Small  bilateral pleural effusions. No pneumothorax.     VESSELS: Contrast bolus is adequate. There are numerous bilateral  pulmonary emboli involving segmental and subsegmental pulmonary  arteries. Pulmonary artery is enlarged measuring 3.7 cm. The  intraventricular septum appears flattened, concerning for right heart  strain.      CARDIAC:  Normal heart size.  No pericardial effusion.  Scattered  coronary artery calcifications.     MEDIASTINUM: There is no mediastinal or hilar lymphadenopathy by CT size  criteria. Esophagus has normal coarse, caliber and wall thickness.     EXTRATHORACIC: Calcified thyroid nodule projecting inferior to the  thyroid, difficult to measure due to streak artifact from vascular  contrast. The extrathoracic soft tissues are within normal limits.      INCLUDED UPPER ABDOMEN: The visualized portions of the upper abdomen are  within normal limits. Elevation of the right hemidiaphragm.     OSSEOUS: Diffusely heterogeneous bone mineralization. This could be seen  with osteopenia, but infiltrative process is not  excluded.          Impression:       1. Positive for pulmonary emboli. Findings concerning for right heart  strain and pulmonary hypertension.  2. Bibasilar opacities, mildly nodular the left lower lobe. This could  represent evolving infarct or infection. Recommend follow-up after  treatment to evaluate for resolution.   3. Diffusely heterogeneous bone mineralization, this could be due to  osteopenia, other metabolic process or multiple myeloma. Recommend  correlation with patient history.  4. Calcified thyroid nodule. If not previously performed an outside  facility, dedicated thyroid ultrasound could be considered.  5. Scattered coronary artery calcifications.     Results communicated by telephone to the patient's nurse Saritha Darling  at 12:51 AM on 08/06/2019. She reports that the managing clinician is  aware of the pulmonary emboli and right heart strain.     Preliminary interpretation performed by Elio and I agree with the  preliminary report.     This report was finalized on 08/06/2019 00:53 by Dr Martina Ojeda MD.          Objective     Allergies   Allergen Reactions   • Penicillins Other (See Comments)     Causes mouth to be sore       Medication Review: Performed  Current Facility-Administered Medications   Medication Dose Route Frequency Provider Last Rate Last Dose   • acetaminophen (TYLENOL) tablet 650 mg  650 mg Oral Q4H PRN Jasen Holcomb MD       • apixaban (ELIQUIS) tablet 5 mg  5 mg Oral Q12H Jasen Holcomb MD   5 mg at 08/12/19 0938   • bisacodyl (DULCOLAX) suppository 10 mg  10 mg Rectal Daily PRN Jasen Holcomb MD   10 mg at 08/09/19 0624   • digoxin (LANOXIN) tablet 125 mcg  125 mcg Oral Daily Jasen Holcomb MD   125 mcg at 08/12/19 1147   • furosemide (LASIX) tablet 40 mg  40 mg Oral Daily Jasen Holcomb MD   40 mg at 08/12/19 0938   • HYDROcodone-acetaminophen (NORCO)  MG per tablet 1 tablet  1 tablet Oral Q6H PRN Jasen Holcomb MD   1 tablet at 08/12/19 1404   • lisinopril  "(PRINIVIL,ZESTRIL) tablet 5 mg  5 mg Oral Q24H Jasen Holcomb MD   5 mg at 08/12/19 0938   • magnesium hydroxide (MILK OF MAGNESIA) suspension 2400 mg/10mL 10 mL  10 mL Oral Daily PRN Jasen Holcomb MD   10 mL at 08/09/19 2142   • melatonin tablet 9.75 mg  9.75 mg Oral Nightly PRN Shaheen Aj MD   9.75 mg at 08/11/19 2107   • metoprolol tartrate (LOPRESSOR) tablet 50 mg  50 mg Oral Q12H Jasen Holcomb MD   50 mg at 08/12/19 0938   • rosuvastatin (CRESTOR) tablet 20 mg  20 mg Oral Nightly Jasen Holcomb MD   20 mg at 08/11/19 2107   • sodium chloride 0.9 % flush 3 mL  3 mL Intravenous Q12H Jasen Holcomb MD   3 mL at 08/12/19 0939   • sodium chloride 0.9 % flush 3-10 mL  3-10 mL Intravenous PRN Jasen Holcomb MD       • tamoxifen (NOLVADEX) chemo tablet 20 mg  20 mg Oral Daily Jasen Holcomb MD   20 mg at 08/12/19 0939       Vital Sign Min/Max for last 24 hours  Temp  Min: 97.8 °F (36.6 °C)  Max: 98.5 °F (36.9 °C)   BP  Min: 90/66  Max: 118/71   Pulse  Min: 75  Max: 99   Resp  Min: 18  Max: 18   SpO2  Min: 91 %  Max: 98 %   No Data Recorded   Weight  Min: 107 kg (236 lb 4 oz)  Max: 107 kg (236 lb 4 oz)     Flowsheet Rows      First Filed Value   Admission Height  172.7 cm (68\") Documented at 08/05/2019 1415   Admission Weight  107 kg (235 lb 9.6 oz) Documented at 08/05/2019 1415          Results for orders placed during the hospital encounter of 08/05/19   Adult Transthoracic Echo Limited W/ Cont if Necessary Per Protocol    Narrative · Estimated EF = 65%.  · Left ventricular wall thickness is consistent with mild concentric   hypertrophy.  · Severe aortic valve stenosis is present.  · Mild pulmonary hypertension is present.  · Normal right ventricular cavity size, wall thickness, systolic function   and septal motion noted.  · RV to LV ratio <1          Physical Exam:    General Appearance: Awake, alert, in no acute distress  Eyes: Pupils equal and reactive    Ears: Appear intact with no abnormalities noted  Nose: Nares " normal, no drainage  Neck: supple, trachea midline, no carotid bruit and no JVD  Back: no kyphosis present,    Lungs: respirations regular, respirations even and respirations unlabored    Heart: normal S1, S2,  2/6 pansystolic murmur in the left sternal border,  no rub and no click    Abdomen: normal bowel sounds, no tenderness   Skin: no bleeding, bruising or rash  Extremities: no cyanosis  Marginal improvement in lower extremity pedal edema  Psychiatric/Behavioral: Negative for agitation, behavioral problems, confusion, the patient does  appear to be nervous/anxious.          Results Review:   I reviewed the patient's new clinical results.  I reviewed the patient's new imaging results and agree with the interpretation.  I reviewed the patient's other test results and agree with the interpretation  I personally viewed and interpreted the patient's EKG/Telemetry data  Discussed with patient    Reviewed available prior notes, consults, prior visits, laboratory findings, radiology and cardiology relevant reports. Updated chart as applicable. I have reviewed the patient's medical history in detail and updated the computerized patient record as relevant.      Updated patient regarding any new or relevant abnormalities on review of records or any new findings on physical exam. Mentioned to patient about purpose of visit and desirable health short and long term goals and objectives.     Assessment/Plan       CHF (congestive heart failure) (CMS/HCC)    Congestive cardiac failure (CMS/HCC)  Pulmonary embolism  Bilateral lower extremity deep vein thrombosis  Advanced age  Subdural hematoma with possible recent bleeding  Severe aortic stenosis  Hyponatremia  Volume overload  Pedal edema        Plan      Overall improved  CT scan of brain again tomorrow  Increase Eliquis from 2.5 mg p.o. twice daily to 5 mg p.o. twice daily  Discussed with his daughter Maria Elena on the phone in detail.  Will recommend  to discuss  directly with his daughter Maria Elena Brunson other current medications  Appreciate neurosurgical help and help from other disciplines    Monitor kidney functions and sodium level.  Part of hyponatremia likely due to SIADH    Jasen Holcomb MD  08/12/19  6:44 PM    EMR Dragon/Transcription was used to dictate part of this note     Electronically signed by Jasen Holcomb MD at 8/12/2019  6:46 PM          Consult Notes (most recent note)      Burton Anaya DO at 8/9/2019  4:39 PM      Consult Orders    1. Inpatient Vascular Surgery Consult [[id number]] ordered by Jasen Holcomb MD at 08/09/19 0647                Madi Mancini  [id number]  [id number]  404/1  Jasen Holcomb MD  8/5/2019    No chief complaint on file.      HPI: This is a 79-year-old male gentleman who went to the operating room on June 16, 2019 for a bilateral craniotomy for subdural hematoma evacuation.  The patient did initially have some difficulty postoperatively there was a large amount of pneumocephalus present however he has been following up in our clinic as an outpatient and has been making improvement.  He currently is residing in a nursing home.  The patient has had increasing difficulty with shortness of breath and swelling in his lower extremities bilaterally.  He was seen by Dr. Holcomb and felt the patient was in congestive heart failure and was subsequently admitted to the hospital for acute treatment.  CT angiogram of the chest was performed which did reveal pulmonary emboli as well as a right heart strain and pulmonary hypertension.  Bilateral Doppler ultrasound of the lower extremities show bilateral lower extremity DVT. The patient has been off anticoagulation since his craniotomy.  Scan of the head yesterday shows improvement in the left subdural but the right subdural has not changed and does appear to be with some increased density however it is stable compared to the previous CT scan of the head.  From a functional standpoint the  patient has made improvement since being in the hospital from the craniotomy.  He is having difficulty with right arm due to a shoulder issue at this time.  He is currently on Eliquis and tolerating it well.      Past Medical History:   Diagnosis Date   • Atrial fibrillation (CMS/HCC)    • Elevated cholesterol    • Hypertension        Past Surgical History:   Procedure Laterality Date   • APPENDECTOMY     • CRANIOTOMY FOR SUBDURAL HEMATOMA Bilateral 6/16/2019    Procedure: CRANIOTOMY FOR SUBDURAL HEMATOMA;  Surgeon: Galen Bhat MD;  Location: [facility];  Service: Neurosurgery   • HEMORROIDECTOMY     • UMBILICAL HERNIA REPAIR         Family History   Problem Relation Age of Onset   • Cancer Mother    • Cancer Father    • No Known Problems Sister    • No Known Problems Brother    • COPD Brother    • Cancer Brother        Social History     Socioeconomic History   • Marital status:      Spouse name: Not on file   • Number of children: Not on file   • Years of education: Not on file   • Highest education level: Not on file   Tobacco Use   • Smoking status: Former Smoker   • Smokeless tobacco: Former User     Types: Chew     Quit date: 2009   • Tobacco comment: quit at age 20   Substance and Sexual Activity   • Alcohol use: No     Frequency: Never   • Drug use: No   • Sexual activity: Defer       Allergies   Allergen Reactions   • Penicillins Other (See Comments)     Causes mouth to be sore       Hospital Medications (active)       Dose Frequency Start End    acetaminophen (TYLENOL) tablet 650 mg 650 mg Every 4 Hours PRN 8/5/2019 6/23/2020    Sig - Route: Take 2 tablets by mouth Every 4 (Four) Hours As Needed for Mild Pain  or Moderate Pain . - Oral    apixaban (ELIQUIS) tablet 2.5 mg 2.5 mg Every 12 Hours Scheduled 8/9/2019     Sig - Route: Take 1 tablet by mouth Every 12 (Twelve) Hours. - Oral    bisacodyl (DULCOLAX) suppository 10 mg 10 mg Daily PRN 8/6/2019     Sig - Route: Insert 1 suppository into the  rectum Daily As Needed for Constipation. - Rectal    digoxin (LANOXIN) tablet 125 mcg 125 mcg Daily Digoxin 8/6/2019     Sig - Route: Take 1 tablet by mouth Daily. - Oral    HYDROcodone-acetaminophen (NORCO)  MG per tablet 1 tablet 1 tablet Every 6 Hours PRN 8/5/2019     Sig - Route: Take 1 tablet by mouth Every 6 (Six) Hours As Needed for Moderate Pain . - Oral    lisinopril (PRINIVIL,ZESTRIL) tablet 5 mg 5 mg Every 24 Hours Scheduled 8/5/2019     Sig - Route: Take 1 tablet by mouth Daily. - Oral    melatonin tablet 9.75 mg 9.75 mg Nightly PRN 8/7/2019 8/12/2019    Sig - Route: Take 3.25 tablets by mouth At Night As Needed for Sleep. - Oral    metoprolol tartrate (LOPRESSOR) tablet 50 mg 50 mg Every 12 Hours Scheduled 8/5/2019     Sig - Route: Take 1 tablet by mouth Every 12 (Twelve) Hours. - Oral    rosuvastatin (CRESTOR) tablet 20 mg 20 mg Nightly 8/5/2019     Sig - Route: Take 1 tablet by mouth Every Night. - Oral    sodium chloride 0.9 % flush 3 mL 3 mL Every 12 Hours Scheduled 8/5/2019     Sig - Route: Infuse 3 mL into a venous catheter Every 12 (Twelve) Hours. - Intravenous    sodium chloride 0.9 % flush 3-10 mL 3-10 mL As Needed 8/5/2019     Sig - Route: Infuse 3-10 mL into a venous catheter As Needed for Line Care. - Intravenous    tamoxifen (NOLVADEX) chemo tablet 20 mg 20 mg Daily 8/5/2019     Sig - Route: Take 1 tablet by mouth Daily. - Oral    enoxaparin (LOVENOX) syringe 80 mg (Discontinued) 0.75 mg/kg × 107 kg Every 12 Hours 8/7/2019 8/9/2019    Sig - Route: Inject 0.8 mL under the skin into the appropriate area as directed Every 12 (Twelve) Hours. - Subcutaneous    metOLazone (ZAROXOLYN) tablet 2.5 mg (Discontinued) 2.5 mg Daily 8/5/2019 8/9/2019    Sig - Route: Take 1 tablet by mouth Daily. - Oral          Review of Systems  Constitutional: Positive for activity change. Negative for fever.   HENT: Negative.    Eyes: Negative.    Respiratory: Positive for shortness of breath.    Cardiovascular:  "Positive for leg swelling.   Gastrointestinal: Negative.    Genitourinary: Negative.    Musculoskeletal: Positive for arthralgias.   Allergic/Immunologic: Negative.    Neurological: Positive for weakness.   Psychiatric/Behavioral: Negative.    All other systems reviewed and are negative.      BP 99/55 (BP Location: Right arm, Patient Position: Sitting)   Pulse 76   Temp 98.9 °F (37.2 °C) (Oral)   Resp 18   Ht 172.7 cm (67.99\")   Wt 109 kg (241 lb 4.8 oz)   SpO2 99%   BMI 36.70 kg/m²      Physical Exam   Constitutional: He is oriented to person, place, and time. He appears well-developed and well-nourished.   HENT:   Head: Normocephalic and atraumatic.   Eyes: Pupils are equal, round, and reactive to light. No scleral icterus.   Neck: Neck supple. No JVD present. Carotid bruit is not present. No thyromegaly present.   Cardiovascular: Normal rate, regular rhythm and normal heart sounds.   Pulses:       Carotid pulses are 2+ on the right side, and 2+ on the left side.       Femoral pulses are 2+ on the right side, and 2+ on the left side.       Popliteal pulses are 2+ on the right side, and 2+ on the left side.        Dorsalis pedis pulses are 2+ on the right side, and 2+ on the left side.        Posterior tibial pulses are 2+ on the right side, and 2+ on the left side.   Pulmonary/Chest: Effort normal and breath sounds normal.   Abdominal: Soft. Bowel sounds are normal. He exhibits no distension, no abdominal bruit and no mass. There is no hepatosplenomegaly. There is no tenderness.   Musculoskeletal: Normal range of motion. He exhibits no edema.   Lymphadenopathy:     He has no cervical adenopathy.   Neurological: He is alert and oriented to person, place, and time. He has normal strength. No cranial nerve deficit or sensory deficit.   Skin: Skin is warm, dry and intact.   Psychiatric: He has a normal mood and affect.   Nursing note and vitals reviewed.      Laboratory Data:  Results from last 7 days   Lab " Units 08/05/19  1640   WBC 10*3/mm3 9.53   HEMOGLOBIN g/dL 12.3*   HEMATOCRIT % 38.2*   PLATELETS 10*3/mm3 103*       Results from last 7 days   Lab Units 08/09/19  0555 08/08/19  0544 08/07/19  0444  08/05/19  1640   SODIUM mmol/L 129* 129* 131*   < > 134*   POTASSIUM mmol/L 3.8 3.8 3.7   < > 3.8   CHLORIDE mmol/L 91* 90* 91*   < > 96*   CO2 mmol/L 31.0 29.0 32.0*   < > 29.0   BUN mg/dL 24* 27* 30*   < > 29*   CREATININE mg/dL 0.90 0.81 0.91   < > 0.94   CALCIUM mg/dL 8.5 8.2* 8.3*   < > 8.4   BILIRUBIN mg/dL  --   --   --   --  0.7   ALK PHOS U/L  --   --   --   --  69   ALT (SGPT) U/L  --   --   --   --  20   AST (SGOT) U/L  --   --   --   --  29   GLUCOSE mg/dL 114* 119* 117*   < > 199*    < > = values in this interval not displayed.     Results from last 7 days   Lab Units 08/05/19  1640   INR  1.86*         Diagnostic Data:  Imaging Results (last 24 hours)     Procedure Component Value Units Date/Time    CT Head Without Contrast [[id number]] Collected:  08/09/19 1145     Updated:  08/09/19 1152    Narrative:       CT HEAD WO CONTRAST- 8/9/2019 11:17 AM CDT     HISTORY: Sub-dural hemorrhage       DOSE LENGTH PRODUCT: 688 mGy cm. Automated exposure control was also  utilized to decrease patient radiation dose.     Technique:   Axial CT of the brain without IV contrast. Sagittal and coronal  reformations are also provided for review. Soft tissue and bone kernels  are available for interpretation.     Comparison: CT scan dated 08/07/2019.     Findings:      Bur holes along the frontal and right parietal convexities. Acute on  chronic right subdural hematoma measuring up to 1.2 cm in greatest  thickness. This is stable. Mild mass effect on the underlying parenchyma  without significant mass effect on the right lateral ventricle.  Additional minimal subdural hemorrhage along the left frontal convexity  which is a more chronic appearance. No midline shift. No intra-axial  hemorrhage. The basal cisterns are symmetric.  Ventricles are nondilated.  Posterior fossa structures are unremarkable. Paranasal sinuses are  clear.       Impression:       Impression:    1. Stable acute on chronic right cerebral convexity subdural hematoma  measuring up to 1.2 cm in greatest thickness. No midline shift.  2. Smaller left frontal convexity subdural hematoma which has a more  chronic appearance. No significant mass effect.  This report was finalized on 08/09/2019 11:49 by Dr Lb Edwards, .          Impression:    CHF (congestive heart failure) (CMS/HCC)    Congestive cardiac failure (CMS/HCC)  Bilateral pulmonary embolus  Bilateral lower extremity DVT    Plan: After thoroughly evaluating Madi Mancini, I believe the best course of action is to remain conservative from a vascular surgery standpoint.  Currently I would hold off on the IVC filter if he is able to tolerate anticoagulation.  If there comes a point where anticoagulation is no longer tolerable due to bleeding then an IVC filter can be placed.  This case was discussed with Dr. Bhat.  Serial short-term CT scan imaging is recommended.  Thank you for allowing me to see Madi Mancini in consult. Please feel free to reach out with any questions or concerns.    Burton Anaya DO  8/9/2019  4:39 PM       Electronically signed by Burton Anaya DO at 8/9/2019  4:45 PM          Nutrition Notes (most recent note)      Caroline Lopez at 8/12/2019  3:35 PM          Problem: Patient Care Overview  Goal: Plan of Care Review  Outcome: Ongoing (interventions implemented as appropriate)   08/12/19 1533   Plan of Care Review   Progress improving   OTHER   Outcome Summary PO intake is good, 96% avg of 7 meals. Noted for 4+ edema to BLE; 1500mL fluid restriction. Discharge planning underway for [facility], will continue to follow.            Electronically signed by Caroline Lopez at 8/12/2019  3:35 PM       Physical Therapy Notes (most recent note)     No notes of this type exist for  this encounter.        Occupational Therapy Notes (most recent note)     No notes of this type exist for this encounter.        Speech Language Pathology Notes (most recent note)     No notes of this type exist for this encounter.        Respiratory Therapy Notes (most recent note)     No notes of this type exist for this encounter.

## 2019-08-13 NOTE — PROGRESS NOTES
Continued Stay Note   [place]     Patient Name: Madi Mancini  MRN: [id number]  Today's Date: 8/13/2019    Admit Date: 8/5/2019    Discharge Plan     Row Name 08/13/19 1119       Plan    Plan  [facility]    Patient/Family in Agreement with Plan  yes    Final Discharge Disposition Code  03 - skilled nursing facility (SNF)    Final Note  Pt's daughter continues to want him dc to her house.  Pt is alert and oriented and continues to want to dc back to [facility].  MD plans to dc pt to [facility] today.  LEIDY left voicemail for Galen in admissions to inform of dc.  Phone:  [phone], fax: [phone].  BREANNA Boswell.        Discharge Codes    No documentation.             BREANNA Sargent

## 2019-08-13 NOTE — PLAN OF CARE
Problem: Patient Care Overview  Goal: Plan of Care Review  Outcome: Ongoing (interventions implemented as appropriate)   08/13/19 0248   Coping/Psychosocial   Plan of Care Reviewed With patient   Plan of Care Review   Progress improving   OTHER   Outcome Summary VSS, c/o pain,medicated per mar with relief, up to wheelchair ad [place], no soa this shift, possible DC to SNF today, will continue to monitor.       Problem: Cardiac: Heart Failure (Adult)  Goal: Signs and Symptoms of Listed Potential Problems Will be Absent, Minimized or Managed (Cardiac: Heart Failure)  Outcome: Ongoing (interventions implemented as appropriate)   08/13/19 0248   Goal/Outcome Evaluation   Problems Assessed (Heart Failure) all   Problems Present (Heart Failure) cardiac pump dysfunction;decreased quality of life;dysrhythmia/arrhythmia;fluid/electrolyte imbalance;functional decline/self-care deficit;situational response       Problem: Fall Risk (Adult)  Goal: Identify Related Risk Factors and Signs and Symptoms  Outcome: Outcome(s) achieved Date Met: 08/13/19    Goal: Absence of Fall  Outcome: Ongoing (interventions implemented as appropriate)   08/13/19 0248   Fall Risk (Adult)   Absence of Fall achieves outcome       Problem: VTE, DVT and PE (Adult)  Goal: Signs and Symptoms of Listed Potential Problems Will be Absent, Minimized or Managed (VTE, DVT and PE)  Outcome: Ongoing (interventions implemented as appropriate)   08/13/19 0248   Goal/Outcome Evaluation   Problems Assessed (VTE, DVT, PE) all   Problems Present (VTE, DVT, PE) deep vein thrombosis;pulmonary embolism

## 2019-08-13 NOTE — PROGRESS NOTES
LOS: 7 days   Patient Care Team:  RHETT Mcgee MD as PCP - General (Family Medicine)    Chief Complaint:   CHF (congestive heart failure) (CMS/HCC)    Congestive cardiac failure (CMS/HCC)    Shortness of breath    Subjective    No new problems  No chest pain  No excessive shortness of breath  No palpitations  No orthopnea paroxysmal nocturnal dyspnea  No headache  Wants to go to nursing home.    Interval History: Improved overall marginally    Telemetry: no malignant arrhythmia. No significant pauses.    Review of Systems     Constitutional: No chills   Has fatigue   No fever.     HENT: Negative.    Eyes: Negative.      Respiratory: Negative for cough,   No chest wall soreness,   Shortness of breath,   no wheezing, no stridor.      Cardiovascular: Shortness of breath and massive bipedal edema  Gastrointestinal: Negative for abdominal distention,  No abdominal pain,   No blood in stool,   No constipation,   No diarrhea,   No nausea   No vomiting.     Endocrine: Negative.    Genitourinary: Negative for difficulty urinating, dysuria, flank pain and hematuria.     Musculoskeletal: Negative.    Skin: Negative for rash and wound.   Allergic/Immunologic: Negative.      Neurological: Negative for dizziness, syncope, weakness,   No light-headedness  No  headaches.     Hematological: Does not bruise/bleed easily.     Psychiatric/Behavioral: Negative for agitation or behavioral problems,   No confusion,   the patient is  nervous/anxious.       History:   Past Medical History:   Diagnosis Date   • Atrial fibrillation (CMS/HCC)    • Elevated cholesterol    • Hypertension      Past Surgical History:   Procedure Laterality Date   • APPENDECTOMY     • CRANIOTOMY FOR SUBDURAL HEMATOMA Bilateral 6/16/2019    Procedure: CRANIOTOMY FOR SUBDURAL HEMATOMA;  Surgeon: Galen Bhat MD;  Location: [facility] OR;  Service: Neurosurgery   • HEMORROIDECTOMY     • UMBILICAL HERNIA REPAIR       Social History     Socioeconomic History    • Marital status:      Spouse name: Not on file   • Number of children: Not on file   • Years of education: Not on file   • Highest education level: Not on file   Tobacco Use   • Smoking status: Former Smoker   • Smokeless tobacco: Former User     Types: Chew     Quit date: 2009   • Tobacco comment: quit at age 20   Substance and Sexual Activity   • Alcohol use: No     Frequency: Never   • Drug use: No   • Sexual activity: Defer     Family History   Problem Relation Age of Onset   • Cancer Mother    • Cancer Father    • No Known Problems Sister    • No Known Problems Brother    • COPD Brother    • Cancer Brother        Labs:  WBC No results found for: WBC   HGB No results found for: HGB   HCT No results found for: HCT   Platelets No results found for: PLT   MCV No results found for: MCV     Results from last 7 days   Lab Units 08/13/19  0458 08/12/19  0415 08/11/19  0636   SODIUM mmol/L 133* 130* 131*   POTASSIUM mmol/L 4.9 4.0 4.4   CHLORIDE mmol/L 96* 95* 95*   CO2 mmol/L 28.0 28.0 29.0   BUN mg/dL 21 19 20   CREATININE mg/dL 0.76 0.75 0.64   CALCIUM mg/dL 8.7 8.4 8.5   GLUCOSE mg/dL 113* 121* 111*     Lab Results   Component Value Date    TROPONINI <0.012 08/05/2019     PT/INR:    No results found for: PROTIME/  No results found for: INR    Imaging Results (last 72 hours)     Procedure Component Value Units Date/Time    US Venous Doppler Lower Extremity Bilateral (duplex) [[id number]] Updated:  08/06/19 0946    CT Angiogram Chest With Contrast [[id number]] Collected:  08/06/19 0043     Updated:  08/06/19 0056    Narrative:       EXAM: CT ANGIOGRAM CHEST W CONTRAST- - 8/5/2019 9:38 PM CDT     HISTORY: Dyspnea, cardiac origin suspected. Bilateral lower extremity  edema.     COMPARISON: None.      DOSE LENGTH PRODUCT: 529 mGy cm. Automatic exposure control was utilized  to make radiation dose as low as reasonably achievable.     TECHNIQUE: Enhanced axial CT images obtained with multiplanar reformats.  3D  postprocessing, including MIPs, performed and images saved to PACS.     FINDINGS:   AIRWAYS/PULMONARY PARENCHYMA: The central airways are midline and  patent. Bibasilar opacities, mildly nodular at the left lower lobe Small  bilateral pleural effusions. No pneumothorax.     VESSELS: Contrast bolus is adequate. There are numerous bilateral  pulmonary emboli involving segmental and subsegmental pulmonary  arteries. Pulmonary artery is enlarged measuring 3.7 cm. The  intraventricular septum appears flattened, concerning for right heart  strain.      CARDIAC:  Normal heart size.  No pericardial effusion.  Scattered  coronary artery calcifications.     MEDIASTINUM: There is no mediastinal or hilar lymphadenopathy by CT size  criteria. Esophagus has normal coarse, caliber and wall thickness.     EXTRATHORACIC: Calcified thyroid nodule projecting inferior to the  thyroid, difficult to measure due to streak artifact from vascular  contrast. The extrathoracic soft tissues are within normal limits.      INCLUDED UPPER ABDOMEN: The visualized portions of the upper abdomen are  within normal limits. Elevation of the right hemidiaphragm.     OSSEOUS: Diffusely heterogeneous bone mineralization. This could be seen  with osteopenia, but infiltrative process is not excluded.          Impression:       1. Positive for pulmonary emboli. Findings concerning for right heart  strain and pulmonary hypertension.  2. Bibasilar opacities, mildly nodular the left lower lobe. This could  represent evolving infarct or infection. Recommend follow-up after  treatment to evaluate for resolution.   3. Diffusely heterogeneous bone mineralization, this could be due to  osteopenia, other metabolic process or multiple myeloma. Recommend  correlation with patient history.  4. Calcified thyroid nodule. If not previously performed an outside  facility, dedicated thyroid ultrasound could be considered.  5. Scattered coronary artery calcifications.      Results communicated by telephone to the patient's nurse Davynikki Darling  at 12:51 AM on 08/06/2019. She reports that the managing clinician is  aware of the pulmonary emboli and right heart strain.     Preliminary interpretation performed by Elio and I agree with the  preliminary report.     This report was finalized on 08/06/2019 00:53 by Dr Martina Ojeda MD.          Objective     Allergies   Allergen Reactions   • Penicillins Other (See Comments)     Causes mouth to be sore       Medication Review: Performed  Current Facility-Administered Medications   Medication Dose Route Frequency Provider Last Rate Last Dose   • acetaminophen (TYLENOL) tablet 650 mg  650 mg Oral Q4H PRN Jasen Holcomb MD       • apixaban (ELIQUIS) tablet 5 mg  5 mg Oral Q12H Jasen Holcomb MD   5 mg at 08/13/19 0902   • bisacodyl (DULCOLAX) suppository 10 mg  10 mg Rectal Daily PRN Jasen Holcomb MD   10 mg at 08/09/19 0624   • digoxin (LANOXIN) tablet 125 mcg  125 mcg Oral Daily Jasen Holcmob MD   125 mcg at 08/13/19 1203   • furosemide (LASIX) tablet 40 mg  40 mg Oral Daily Jasen Holcomb MD   40 mg at 08/13/19 0902   • HYDROcodone-acetaminophen (NORCO)  MG per tablet 1 tablet  1 tablet Oral Q6H PRN Jasen Holcomb MD   1 tablet at 08/13/19 1406   • lisinopril (PRINIVIL,ZESTRIL) tablet 5 mg  5 mg Oral Q24H Jasen Holcomb MD   5 mg at 08/13/19 0902   • magnesium hydroxide (MILK OF MAGNESIA) suspension 2400 mg/10mL 10 mL  10 mL Oral Daily PRN Jasen Holcomb MD   10 mL at 08/09/19 2142   • melatonin tablet 9 mg  9 mg Oral Nightly PRN Jasen Holcomb MD       • metoprolol tartrate (LOPRESSOR) tablet 50 mg  50 mg Oral Q12H Jasen Holcomb MD   50 mg at 08/13/19 0901   • rosuvastatin (CRESTOR) tablet 20 mg  20 mg Oral Nightly Jasen Holcomb MD   20 mg at 08/12/19 2118   • sodium chloride 0.9 % flush 3 mL  3 mL Intravenous Q12H Jasen Holcomb MD   3 mL at 08/13/19 0902   • sodium chloride 0.9 % flush 3-10 mL  3-10 mL Intravenous PRN Jasen Holcomb MD  "      • tamoxifen (NOLVADEX) chemo tablet 20 mg  20 mg Oral Daily Jasen Holcomb MD   20 mg at 08/13/19 0901       Vital Sign Min/Max for last 24 hours  Temp  Min: 98.2 °F (36.8 °C)  Max: 98.8 °F (37.1 °C)   BP  Min: 92/59  Max: 124/83   Pulse  Min: 52  Max: 104   Resp  Min: 18  Max: 20   SpO2  Min: 90 %  Max: 97 %   No Data Recorded   Weight  Min: 107 kg (236 lb)  Max: 107 kg (236 lb)     Flowsheet Rows      First Filed Value   Admission Height  172.7 cm (68\") Documented at 08/05/2019 1415   Admission Weight  107 kg (235 lb 9.6 oz) Documented at 08/05/2019 1415          Results for orders placed during the hospital encounter of 08/05/19   Adult Transthoracic Echo Limited W/ Cont if Necessary Per Protocol    Narrative · Estimated EF = 65%.  · Left ventricular wall thickness is consistent with mild concentric   hypertrophy.  · Severe aortic valve stenosis is present.  · Mild pulmonary hypertension is present.  · Normal right ventricular cavity size, wall thickness, systolic function   and septal motion noted.  · RV to LV ratio <1          Physical Exam:    General Appearance: Awake, alert, in no acute distress  Eyes: Pupils equal and reactive    Ears: Appear intact with no abnormalities noted  Nose: Nares normal, no drainage  Neck: supple, trachea midline, no carotid bruit and no JVD  Back: no kyphosis present,    Lungs: respirations regular, respirations even and respirations unlabored    Heart: normal S1, S2,  2/6 pansystolic murmur in the left sternal border,  no rub and no click    Abdomen: normal bowel sounds, no tenderness   Skin: no bleeding, bruising or rash  Extremities: no cyanosis  Marginal improvement in lower extremity pedal edema  Psychiatric/Behavioral: Negative for agitation, behavioral problems, confusion, the patient does  appear to be nervous/anxious.          Results Review:   I reviewed the patient's new clinical results.  I reviewed the patient's new imaging results and agree with the " interpretation.  I reviewed the patient's other test results and agree with the interpretation  I personally viewed and interpreted the patient's EKG/Telemetry data  Discussed with patient    Reviewed available prior notes, consults, prior visits, laboratory findings, radiology and cardiology relevant reports. Updated chart as applicable. I have reviewed the patient's medical history in detail and updated the computerized patient record as relevant.      Updated patient regarding any new or relevant abnormalities on review of records or any new findings on physical exam. Mentioned to patient about purpose of visit and desirable health short and long term goals and objectives.     Assessment/Plan       CHF (congestive heart failure) (CMS/[facility])    Congestive cardiac failure (CMS/[facility])  Pulmonary embolism  Bilateral lower extremity deep vein thrombosis  Advanced age  Subdural hematoma with possible recent bleeding  Severe aortic stenosis  Hyponatremia  Volume overload  Pedal edema        Plan      Continue current medication  CT scan of brain ordered which subsequently showed stable findings and no further evidence of bleeding  Discussed with charge nurse  Patient will continue to be in the hospital  Monitor kidney functions  Awaiting nursing home bed.    Continue other current medications  Appreciate neurosurgical help and help from other disciplines    Monitor kidney functions and sodium level.  Part of hyponatremia likely due to SIADH    Jasen Holcomb MD  08/13/19  6:20 PM    EMR Dragon/Transcription was used to dictate part of this note

## 2019-08-13 NOTE — PLAN OF CARE
Problem: Patient Care Overview  Goal: Plan of Care Review  Outcome: Ongoing (interventions implemented as appropriate)   08/13/19 1513   Coping/Psychosocial   Plan of Care Reviewed With patient   Plan of Care Review   Progress no change   OTHER   Outcome Summary pt co pain, medication given. Edeam to tea lower ext. CT of head showed decrease tea subdural hematomas and no new hemorrhage. buttocks is red, cream applied. he will go to [facility] tomorrow.     Goal: Individualization and Mutuality  Outcome: Ongoing (interventions implemented as appropriate)    Goal: Discharge Needs Assessment  Outcome: Ongoing (interventions implemented as appropriate)    Goal: Interprofessional Rounds/Family Conf  Outcome: Ongoing (interventions implemented as appropriate)      Problem: Cardiac: Heart Failure (Adult)  Goal: Signs and Symptoms of Listed Potential Problems Will be Absent, Minimized or Managed (Cardiac: Heart Failure)  Outcome: Ongoing (interventions implemented as appropriate)      Problem: Fall Risk (Adult)  Goal: Absence of Fall  Outcome: Ongoing (interventions implemented as appropriate)      Problem: VTE, DVT and PE (Adult)  Goal: Signs and Symptoms of Listed Potential Problems Will be Absent, Minimized or Managed (VTE, DVT and PE)  Outcome: Ongoing (interventions implemented as appropriate)

## 2019-08-13 NOTE — PROGRESS NOTES
Continued Stay Note  [facility]     Patient Name: Madi Mancini  MRN: [id number]  Today's Date: 8/13/2019    Admit Date: 8/5/2019    Discharge Plan     Row Name 08/13/19 1408       Plan    Plan  [facility]    Patient/Family in Agreement with Plan  yes    Plan Comments  [facility] at [facility] called back stating that they have given pt's bed away.  They need a new referral and can accept pt tomorrow.  LEIDY has informed charge KAIA Whatley.  BREANNA Boswell.    Row Name 08/13/19 1119       Plan    Plan  [facility]    Patient/Family in Agreement with Plan  yes    Final Discharge Disposition Code  03 - skilled nursing facility (SNF)    Final Note  Pt's daughter continues to want him dc to her house.  Pt is alert and oriented and continues to want to dc back to [facility].  MD plans to dc pt to [facility] today.  LEIDY left voicemail for [facility] in admissions to inform of dc.  Phone:  [phone], fax: [phone].  BREANNA Boswell.        Discharge Codes    No documentation.             BREANNA Sargent

## 2019-08-14 VITALS
RESPIRATION RATE: 16 BRPM | DIASTOLIC BLOOD PRESSURE: 65 MMHG | TEMPERATURE: 98.1 F | HEIGHT: 68 IN | WEIGHT: 235.6 LBS | OXYGEN SATURATION: 95 % | HEART RATE: 75 BPM | SYSTOLIC BLOOD PRESSURE: 97 MMHG | BODY MASS INDEX: 35.71 KG/M2

## 2019-08-14 LAB
ANION GAP SERPL CALCULATED.3IONS-SCNC: 7 MMOL/L (ref 4–13)
BUN BLD-MCNC: 20 MG/DL (ref 5–21)
BUN/CREAT SERPL: 24.7 (ref 7–25)
CALCIUM SPEC-SCNC: 8.8 MG/DL (ref 8.4–10.4)
CHLORIDE SERPL-SCNC: 96 MMOL/L (ref 98–110)
CO2 SERPL-SCNC: 29 MMOL/L (ref 24–31)
CREAT BLD-MCNC: 0.81 MG/DL (ref 0.5–1.4)
GFR SERPL CREATININE-BSD FRML MDRD: 92 ML/MIN/1.73
GLUCOSE BLD-MCNC: 127 MG/DL (ref 70–100)
POTASSIUM BLD-SCNC: 4.9 MMOL/L (ref 3.5–5.3)
SODIUM BLD-SCNC: 132 MMOL/L (ref 135–145)

## 2019-08-14 PROCEDURE — 99239 HOSP IP/OBS DSCHRG MGMT >30: CPT | Performed by: INTERNAL MEDICINE

## 2019-08-14 PROCEDURE — 80048 BASIC METABOLIC PNL TOTAL CA: CPT | Performed by: INTERNAL MEDICINE

## 2019-08-14 PROCEDURE — 99024 POSTOP FOLLOW-UP VISIT: CPT | Performed by: NURSE PRACTITIONER

## 2019-08-14 RX ORDER — BISACODYL 10 MG
10 SUPPOSITORY, RECTAL RECTAL DAILY PRN
Qty: 30 EACH | Refills: 1 | Status: SHIPPED | OUTPATIENT
Start: 2019-08-14

## 2019-08-14 RX ORDER — LISINOPRIL 5 MG/1
5 TABLET ORAL
Qty: 30 TABLET | Refills: 3 | Status: SHIPPED | OUTPATIENT
Start: 2019-08-15

## 2019-08-14 RX ORDER — HYDROCODONE BITARTRATE AND ACETAMINOPHEN 5; 325 MG/1; MG/1
1 TABLET ORAL EVERY 6 HOURS PRN
Qty: 12 TABLET | Refills: 0 | Status: SHIPPED | OUTPATIENT
Start: 2019-08-14

## 2019-08-14 RX ADMIN — TAMOXIFEN CITRATE 20 MG: 20 TABLET ORAL at 08:46

## 2019-08-14 RX ADMIN — DIGOXIN 125 MCG: 125 TABLET ORAL at 12:06

## 2019-08-14 RX ADMIN — HYDROCODONE BITARTRATE AND ACETAMINOPHEN 1 TABLET: 10; 325 TABLET ORAL at 06:28

## 2019-08-14 RX ADMIN — FUROSEMIDE 40 MG: 40 TABLET ORAL at 08:46

## 2019-08-14 RX ADMIN — HYDROCODONE BITARTRATE AND ACETAMINOPHEN 1 TABLET: 10; 325 TABLET ORAL at 12:06

## 2019-08-14 RX ADMIN — LISINOPRIL 5 MG: 5 TABLET ORAL at 08:46

## 2019-08-14 RX ADMIN — METOPROLOL TARTRATE 50 MG: 50 TABLET ORAL at 08:46

## 2019-08-14 RX ADMIN — SODIUM CHLORIDE, PRESERVATIVE FREE 3 ML: 5 INJECTION INTRAVENOUS at 08:47

## 2019-08-14 RX ADMIN — APIXABAN 5 MG: 5 TABLET, FILM COATED ORAL at 08:46

## 2019-08-14 NOTE — PLAN OF CARE
Problem: Patient Care Overview  Goal: Plan of Care Review  Outcome: Ongoing (interventions implemented as appropriate)   08/14/19 0443   Coping/Psychosocial   Plan of Care Reviewed With patient   Plan of Care Review   Progress improving   OTHER   Outcome Summary VSS, medicated for chronic pain with relief, up with wheelchair, awaiting DC to [facility] today.       Problem: Cardiac: Heart Failure (Adult)  Goal: Signs and Symptoms of Listed Potential Problems Will be Absent, Minimized or Managed (Cardiac: Heart Failure)  Outcome: Ongoing (interventions implemented as appropriate)   08/14/19 0443   Goal/Outcome Evaluation   Problems Assessed (Heart Failure) all   Problems Present (Heart Failure) cardiac pump dysfunction;decreased quality of life;dysrhythmia/arrhythmia;fluid/electrolyte imbalance;functional decline/self-care deficit       Problem: Fall Risk (Adult)  Goal: Absence of Fall  Outcome: Ongoing (interventions implemented as appropriate)   08/14/19 0443   Fall Risk (Adult)   Absence of Fall achieves outcome       Problem: VTE, DVT and PE (Adult)  Goal: Signs and Symptoms of Listed Potential Problems Will be Absent, Minimized or Managed (VTE, DVT and PE)  Outcome: Ongoing (interventions implemented as appropriate)   08/14/19 0443   Goal/Outcome Evaluation   Problems Assessed (VTE, DVT, PE) all   Problems Present (VTE, DVT, PE) deep vein thrombosis;pulmonary embolism

## 2019-08-14 NOTE — DISCHARGE SUMMARY
LOS: 8 days   Patient Care Team:  RHETT Mcgee MD as PCP - General (Family Medicine)    Chief Complaint:   CHF (congestive heart failure) (CMS/HCC)    Congestive cardiac failure (CMS/HCC)    Shortness of breath    Subjective  Feeling  better  No chest pain   No excessive shortness of breath  No new complaints  Lower extremity leg swelling persistent but improved    Interval History: Improved overall    Denies chest pain currently. Denies excessive shortness of breath. Denies abdominal pain, nausea vomiting or diarrhoea.    Telemetry: no malignant arrhythmia. No significant pauses.    Review of Systems   Constitutional: No chills   No fatigue   No fever.   HENT: Negative.    Eyes: Negative.    Respiratory: Negative for cough,   No chest wall soreness,   Mild shortness of breath,   no wheezing, no stridor.    Cardiovascular: Negative for chest pain,   No palpitations   Moderate bilateral leg swelling   Gastrointestinal: Negative for abdominal distention,  No abdominal pain,   No blood in stool, constipation, diarrhea, nausea or vomiting.   Endocrine: Negative.    Genitourinary: Negative for difficulty urinating, dysuria, flank pain and hematuria.   Musculoskeletal: Negative.    Skin: Negative for rash and wound.   Allergic/Immunologic: Negative.    Neurological: Negative for dizziness, syncope, weakness,   No light-headedness or headaches.   Hematological: Does not bruise/bleed easily.   Psychiatric/Behavioral: Negative for agitation, behavioral problems, confusion, the patient does not appear to be nervous/anxious.       History:   Past Medical History:   Diagnosis Date   • Atrial fibrillation (CMS/HCC)    • Elevated cholesterol    • Hypertension      Past Surgical History:   Procedure Laterality Date   • APPENDECTOMY     • CRANIOTOMY FOR SUBDURAL HEMATOMA Bilateral 6/16/2019    Procedure: CRANIOTOMY FOR SUBDURAL HEMATOMA;  Surgeon: Galen Bhat MD;  Location: [facility];  Service: Neurosurgery   •  HEMORROIDECTOMY     • UMBILICAL HERNIA REPAIR       Social History     Socioeconomic History   • Marital status:      Spouse name: Not on file   • Number of children: Not on file   • Years of education: Not on file   • Highest education level: Not on file   Tobacco Use   • Smoking status: Former Smoker   • Smokeless tobacco: Former User     Types: Chew     Quit date: 2009   • Tobacco comment: quit at age 20   Substance and Sexual Activity   • Alcohol use: No     Frequency: Never   • Drug use: No   • Sexual activity: Defer     Family History   Problem Relation Age of Onset   • Cancer Mother    • Cancer Father    • No Known Problems Sister    • No Known Problems Brother    • COPD Brother    • Cancer Brother        Labs:  WBC No results found for: WBC   HGB No results found for: HGB   HCT No results found for: HCT   Platlets No results found for: PLT   MCV No results found for: MCV     Results from last 7 days   Lab Units 08/14/19  0450 08/13/19  0458 08/12/19  0415   SODIUM mmol/L 132* 133* 130*   POTASSIUM mmol/L 4.9 4.9 4.0   CHLORIDE mmol/L 96* 96* 95*   CO2 mmol/L 29.0 28.0 28.0   BUN mg/dL 20 21 19   CREATININE mg/dL 0.81 0.76 0.75   CALCIUM mg/dL 8.8 8.7 8.4   GLUCOSE mg/dL 127* 113* 121*     Lab Results   Component Value Date    TROPONINI <0.012 08/05/2019     PT/INR:  No results found for: PROTIME/No results found for: INR    Imaging Results (last 72 hours)     Procedure Component Value Units Date/Time    CT Head Without Contrast [[id number]] Collected:  08/13/19 1141     Updated:  08/13/19 1153    Narrative:       EXAM: CT OF THE HEAD WITHOUT IV CONTRAST 08/13/2019     COMPARISON: Head CT dated 08/09/2019      INDICATION: Male, 79 years-old. Intracranial hemorrhage      PROCEDURE: Non contrast enhanced head CT was performed.      Radiation dose equals DLP 1724 mGy-cm.  Automated exposure control dose  reduction technique was implemented.     FINDINGS:  Evolving changes in the previously seen right  subdural hemorrhage, which  shows decreased attenuation as well as overall decrease in size,  measuring up to 1 cm in thickness today examination, previously 1.2 cm.  The posterior aspect is also overall decrease.     Trace left subdural hemorrhage is also slightly decreased in size, now  measuring up to 4 mm on today's examination, previously 6 mm.     There is no new intracranial hemorrhage. No acute hydrocephalus. No  intraventricular component to the hemorrhage. Similar appearance of the  tentorial leaves.     The gray-white differentiation is preserved.     Bifrontal craniotomy defects.       Impression:       1.  Decreasing bilateral subdural hematomas.  2.  No new intracranial hemorrhage.  This report was finalized on 08/13/2019 11:50 by Dr. Audra Cardenas MD.          Objective     Allergies   Allergen Reactions   • Penicillins Other (See Comments)     Causes mouth to be sore       Medication Review: Performed  Current Facility-Administered Medications   Medication Dose Route Frequency Provider Last Rate Last Dose   • acetaminophen (TYLENOL) tablet 650 mg  650 mg Oral Q4H PRN Jasen Holcomb MD       • apixaban (ELIQUIS) tablet 5 mg  5 mg Oral Q12H Jasen Holcomb MD   5 mg at 08/14/19 0846   • bisacodyl (DULCOLAX) suppository 10 mg  10 mg Rectal Daily PRN Jasen Holcomb MD   10 mg at 08/09/19 0624   • digoxin (LANOXIN) tablet 125 mcg  125 mcg Oral Daily Jasen Holcomb MD   125 mcg at 08/13/19 1203   • furosemide (LASIX) tablet 40 mg  40 mg Oral Daily Jasen Holcomb MD   40 mg at 08/14/19 0846   • HYDROcodone-acetaminophen (NORCO)  MG per tablet 1 tablet  1 tablet Oral Q6H PRN Jasen Holcomb MD   1 tablet at 08/14/19 0628   • lisinopril (PRINIVIL,ZESTRIL) tablet 5 mg  5 mg Oral Q24H Jasen Holcomb MD   5 mg at 08/14/19 0846   • magnesium hydroxide (MILK OF MAGNESIA) suspension 2400 mg/10mL 10 mL  10 mL Oral Daily PRN Jasen Holcomb MD   10 mL at 08/09/19 2142   • melatonin tablet 9 mg  9 mg Oral Nightly PRN  "Jasen Holcomb MD   9 mg at 08/13/19 2139   • metoprolol tartrate (LOPRESSOR) tablet 50 mg  50 mg Oral Q12H Jasen Holcomb MD   50 mg at 08/14/19 0846   • rosuvastatin (CRESTOR) tablet 20 mg  20 mg Oral Nightly Jasen Holcomb MD   20 mg at 08/13/19 2139   • sodium chloride 0.9 % flush 3 mL  3 mL Intravenous Q12H Jasen Holcomb MD   3 mL at 08/14/19 0847   • sodium chloride 0.9 % flush 3-10 mL  3-10 mL Intravenous PRN Jasen Holcomb MD       • tamoxifen (NOLVADEX) chemo tablet 20 mg  20 mg Oral Daily Jasen Holcomb MD   20 mg at 08/14/19 0846       Vital Sign Min/Max for last 24 hours  Temp  Min: 98.1 °F (36.7 °C)  Max: 99.5 °F (37.5 °C)   BP  Min: 92/59  Max: 124/83   Pulse  Min: 53  Max: 91   Resp  Min: 16  Max: 18   SpO2  Min: 90 %  Max: 97 %   No Data Recorded   Weight  Min: 107 kg (235 lb 9.6 oz)  Max: 107 kg (235 lb 9.6 oz)     Flowsheet Rows      First Filed Value   Admission Height  172.7 cm (68\") Documented at 08/05/2019 1415   Admission Weight  107 kg (235 lb 9.6 oz) Documented at 08/05/2019 1415          Results for orders placed during the hospital encounter of 08/05/19   Adult Transthoracic Echo Limited W/ Cont if Necessary Per Protocol    Narrative · Estimated EF = 65%.  · Left ventricular wall thickness is consistent with mild concentric   hypertrophy.  · Severe aortic valve stenosis is present.  · Mild pulmonary hypertension is present.  · Normal right ventricular cavity size, wall thickness, systolic function   and septal motion noted.  · RV to LV ratio <1            Consults:   Consults     Date and Time Order Name Status Description    8/9/2019 0647 Inpatient Vascular Surgery Consult Completed     8/5/2019 1627 Inpatient Neurosurgery Consult Completed           Procedures Performed         Physical Exam:  General Appearance: Awake, alert, in no acute distress  Eyes: Pupils equal and reactive    Ears: Appear intact with no abnormalities noted  Nose: Nares normal, no drainage  Neck: supple, trachea " midline, no carotid bruit and no JVD  Back: no kyphosis present,    Lungs: respirations regular, respirations even and respirations unlabored  Heart: normal S1, S2,  2/6 pansystolic murmur in the left sternal border,  no rub and no click  Abdomen: normal bowel sounds, no masses, no hepatomegaly, no splenomegaly, guarding and no rebound tenderness   Skin: no bleeding, bruising or rash  Extremities: no cyanosis  Psychiatric/Behavioral: Negative for agitation, behavioral problems, confusion, the patient does not appear to be nervous/anxious.          Results Review:   I reviewed the patient's new clinical results.  I reviewed the patient's new imaging results and agree with the interpretation.  I reviewed the patient's other test results and agree with the interpretation  I personally viewed and interpreted the patient's EKG/Telemetry data  Discussed with patient,        Assessment/Plan     Discharge diagnosis with prior    CHF (congestive heart failure) (CMS/[facility])    Congestive cardiac failure (CMS/[facility])      Hospital Course  Patient is a 79 y.o. male presented with bilateral severe leg swelling  Patient was admitted.  Has bilateral lower extremity DVT  Has bilateral pulmonary embolism  Initially had right ventricular strain with subsequently resolved on repeat echocardiogram  Pulmonary lysis was not performed  Started on Lovenox 0.5 mg/kg every 12 hours and subsequently increased to 0.75 mg/kg every 12 hours subcutaneously.  Neurosurgery saw the patient  Serial CT scan of the brain did not reveal any worsening of intracranial hematoma  Switch then to Eliquis initially 2.5 mg p.o. twice daily and then increase to 5 mg p.o. twice daily which he tolerated  Started on IV diuretics and then switched to oral  Due to hyponatremia this was subsequently discontinued  Hyponatremia improved  On day of discharge stable and improved  Leg swelling persistent but overall reduced  Using lower extremity compression stockings.  Daughter  kept updated with progress      Condition on Discharge: Stable and Improved  ______________________________________________________________________________________________________________________________________________________________________________________________________________________________       Plan on discharge  ______________________________________________________________________________________________________________________________________________________________________________________________________________________________       OK to discharge  Low salt cardiac diet.   Discharge to skilled nursing home with improvement or resolution of presenting symptoms or complaints  Ambulating    Optimal medical therapy  Deep vein thrombosis precautions with hydration and increasing mobility  Counseled regarding disease appropriate diet, fluid, sodium, caffeine, stimulants intake   Stressed compliance to diet and medications   Avoid NSAIDS    Follow up with primary provider and primary cardiologist as scheduled   Overall improved and deemed fit for discharge  Will be provided with written discharge instructions including diet and medications including prescriptions as required and labs as applicable    Questions were encouraged, asked and answered to the patient's  understanding and satisfaction.    Go to nearest emergency room if recurrence of primary complaints or any suspected disabling or life threatening symptoms or complaints such as chest pain, increasing shortness of breath, profound dizziness, weakness, significant palpitations, passing out    episodes, cold sweats, excessive nausea etc  More than 30 minutes spent in the discharge process.   _______________________________________________________________________________________________________________________________________________________________________________________________________________________________________    Discharge Disposition: To  home and self care  Skilled Nursing Facility (DC - External)    Discharge Medications     Discharge Medications      New Medications      Instructions Start Date   apixaban 5 MG tablet tablet  Commonly known as:  ELIQUIS   5 mg, Oral, Every 12 Hours Scheduled      bisacodyl 10 MG suppository  Commonly known as:  DULCOLAX   10 mg, Rectal, Daily PRN         Changes to Medications      Instructions Start Date   lisinopril 5 MG tablet  Commonly known as:  PRINIVIL,ZESTRIL  What changed:    · medication strength  · how much to take  · how to take this  · when to take this   5 mg, Oral, Every 24 Hours Scheduled   Start Date:  8/15/2019        Continue These Medications      Instructions Start Date   acetaminophen 325 MG tablet  Commonly known as:  TYLENOL   650 mg, Oral, Every 4 Hours PRN      amLODIPine 5 MG tablet  Commonly known as:  NORVASC   5 mg, Oral, 2 Times Daily      digoxin 125 MCG tablet  Commonly known as:  LANOXIN   125 mcg, Oral, Daily Digoxin      furosemide 40 MG tablet  Commonly known as:  LASIX   40 mg, Oral, 2 Times Daily      HYDROcodone-acetaminophen 5-325 MG per tablet  Commonly known as:  NORCO   1 tablet, Oral, Every 6 Hours PRN      magnesium hydroxide 400 MG/5ML suspension  Commonly known as:  MILK OF MAGNESIA   30 mL, Oral, Daily PRN      melatonin 5 MG tablet tablet   10 mg, Oral, Nightly      metoprolol tartrate 50 MG tablet  Commonly known as:  LOPRESSOR   50 mg, Oral, Every 12 Hours Scheduled      polyethylene glycol packet  Commonly known as:  MIRALAX   17 g, Oral, 2 Times Daily PRN      rosuvastatin 20 MG tablet  Commonly known as:  CRESTOR   20 mg, Oral, Nightly      senna 8.6 MG tablet tablet  Commonly known as:  SENOKOT   1 tablet, Oral, Daily      tamoxifen 10 MG tablet  Commonly known as:  NOLVADEX   20 mg, Oral, Daily      vitamin D 70580 units capsule capsule  Commonly known as:  ERGOCALCIFEROL   50,000 Units, Oral, Weekly             Discharge Diet:  Low salt heart healthy cardiac  diet    Activity at Discharge:  As tolerated    Follow-up Appointments  Future Appointments   Date Time Provider Department Center   9/16/2019  8:45 AM Jasen Holcomb MD [facility]   9/16/2019 10:45 AM Marco A Maya APRN [facility] None         Test Results Pending at Discharge: None       Jasen Holcomb MD  08/14/19  10:48 AM

## 2019-08-14 NOTE — PROGRESS NOTES
Continued Stay Note  [facility]     Patient Name: Madi Mancini  MRN: [id number]  Today's Date: 8/14/2019    Admit Date: 8/5/2019    Discharge Plan     Row Name 08/14/19 0913       Plan    Plan  [facility]    Patient/Family in Agreement with Plan  yes    Final Discharge Disposition Code  03 - skilled nursing facility (SNF)    Final Note  SW spoke to Galen at [facility].  He has pt a new bed and pt can dc today.  They need pt by 2:00PM today.  He is requesting paperwork be faxed ASAP so they can start getting medications.  Phone:  [phone], fax: [phone].  BREANNA Boswell.        Discharge Codes    No documentation.             BREANNA Sargent

## 2019-08-14 NOTE — PROGRESS NOTES
"Madi Mancini  79 y.o.      Chief complaint:   No complaints    Subjective  No events    Temp:  [98.1 °F (36.7 °C)-99.5 °F (37.5 °C)] 98.1 °F (36.7 °C)  Heart Rate:  [53-91] 66  Resp:  [18] 18  BP: ()/(57-83) 96/57      Objective:  General Appearance:  Comfortable, well-appearing, in no acute distress and not in pain.    Vital signs: (most recent): Blood pressure 96/57, pulse 66, temperature 98.1 °F (36.7 °C), temperature source Oral, resp. rate 18, height 172.7 cm (67.99\"), weight 107 kg (235 lb 9.6 oz), SpO2 96 %.  Vital signs are normal.  No fever.    Output: Producing urine.    HEENT: Normal HEENT exam.    Lungs:  Normal effort and normal respiratory rate.  He is not in respiratory distress.  (Patient has oxygen on per nasal cannula)  Heart: Normal rate.  Regular rhythm.    Chest: Symmetric chest wall expansion.   Extremities: There is local swelling.    Skin:  Warm and dry.    Abdomen: Abdomen is soft.  Bowel sounds are normal.     Pulses: Distal pulses are intact.        Neurologic Exam     Mental Status   Oriented to person, place, and time.   Attention: normal. Concentration: normal.   Speech: speech is normal   Level of consciousness: alert    Cranial Nerves   Cranial nerves II through XII intact.     Motor Exam   Muscle bulk: normal    Strength   Strength 5/5 throughout. Limited use of right arm due to right shoulder pain     Sensory Exam   Light touch normal.         CHF (congestive heart failure) (CMS/[facility])    Congestive cardiac failure (CMS/[facility])      Lab Results (last 24 hours)     Procedure Component Value Units Date/Time    Basic Metabolic Panel [[id number]]  (Abnormal) Collected:  08/14/19 0450    Specimen:  Blood Updated:  08/14/19 0555     Glucose 127 mg/dL      BUN 20 mg/dL      Creatinine 0.81 mg/dL      Sodium 132 mmol/L      Potassium 4.9 mmol/L      Chloride 96 mmol/L      CO2 29.0 mmol/L      Calcium 8.8 mg/dL      eGFR Non African Amer 92 mL/min/1.73      BUN/Creatinine Ratio 24.7     " Anion Gap 7.0 mmol/L     Narrative:       GFR Normal >60  Chronic Kidney Disease <60  Kidney Failure <15              Plan:   Patient doing well.  Plan to discharge patient back to [facility].  Patient has an appointment to see me on September 16, 2019.  CT scan of the head is to be done on that appointment.  Nursing home is to call with any neurological changes.  He can participate with any activity physical therapy deems appropriate.    Marco A Maya, APRN

## 2019-08-27 ENCOUNTER — TELEPHONE (OUTPATIENT)
Dept: NEUROSURGERY | Facility: CLINIC | Age: 79
End: 2019-08-27

## 2019-08-27 DIAGNOSIS — S06.5XAD TRAUMATIC SUBDURAL HEMATOMA, SUBSEQUENT ENCOUNTER: Primary | ICD-10-CM

## 2019-08-27 NOTE — TELEPHONE ENCOUNTER
This patient has an appointment on 09/16/19 with the office for follow up, he needs a CT scan the same day as the appointment.  Call Neelam with [facility] with the time for the CT Scan.   Her number [phone]

## 2019-09-12 ENCOUNTER — TELEPHONE (OUTPATIENT)
Dept: NEUROSURGERY | Facility: CLINIC | Age: 79
End: 2019-09-12

## 2019-09-12 NOTE — TELEPHONE ENCOUNTER
In reviewing Monday, September 16th schedule I noticed that Madi's CT scan was still not scheduled, scheduling has been trying to reach him to get this set up, I followed up with him and he states he daughter had cleared this up after receiving a letter from us and thought this had been scheduled.  I explained to him I Do Not see this test scheduled.  He is going to have his daughter follow up with scheduling.    Marco A states we will see him Monday with out it but we will definitely have to send him over for the CT scan.

## 2019-09-16 ENCOUNTER — OFFICE VISIT (OUTPATIENT)
Dept: CARDIOLOGY | Facility: CLINIC | Age: 79
End: 2019-09-16

## 2019-09-16 ENCOUNTER — HOSPITAL ENCOUNTER (OUTPATIENT)
Dept: CT IMAGING | Facility: HOSPITAL | Age: 79
Discharge: HOME | End: 2019-09-16
Admitting: NURSE PRACTITIONER

## 2019-09-16 ENCOUNTER — HOSPITAL ENCOUNTER (OUTPATIENT)
Dept: MRI IMAGING | Facility: HOSPITAL | Age: 79
End: 2019-09-16

## 2019-09-16 ENCOUNTER — OFFICE VISIT (OUTPATIENT)
Dept: NEUROSURGERY | Facility: CLINIC | Age: 79
End: 2019-09-16

## 2019-09-16 VITALS
BODY MASS INDEX: 33.65 KG/M2 | WEIGHT: 222 LBS | SYSTOLIC BLOOD PRESSURE: 110 MMHG | HEIGHT: 68 IN | HEART RATE: 78 BPM | DIASTOLIC BLOOD PRESSURE: 72 MMHG | OXYGEN SATURATION: 99 %

## 2019-09-16 VITALS
BODY MASS INDEX: 33.65 KG/M2 | WEIGHT: 222 LBS | HEIGHT: 68 IN | DIASTOLIC BLOOD PRESSURE: 72 MMHG | SYSTOLIC BLOOD PRESSURE: 110 MMHG

## 2019-09-16 DIAGNOSIS — S06.5XAA BILATERAL SUBDURAL HEMATOMAS (HCC): ICD-10-CM

## 2019-09-16 DIAGNOSIS — S06.5XAA BILATERAL SUBDURAL HEMATOMAS (HCC): Primary | ICD-10-CM

## 2019-09-16 DIAGNOSIS — Z87.891 FORMER SMOKER: ICD-10-CM

## 2019-09-16 DIAGNOSIS — I50.9 CONGESTIVE HEART FAILURE, UNSPECIFIED HF CHRONICITY, UNSPECIFIED HEART FAILURE TYPE (HCC): ICD-10-CM

## 2019-09-16 DIAGNOSIS — S06.5XAD TRAUMATIC SUBDURAL HEMATOMA, SUBSEQUENT ENCOUNTER: ICD-10-CM

## 2019-09-16 DIAGNOSIS — R60.0 PEDAL EDEMA: Primary | ICD-10-CM

## 2019-09-16 DIAGNOSIS — I48.20 ATRIAL FIBRILLATION, CHRONIC (HCC): ICD-10-CM

## 2019-09-16 DIAGNOSIS — I10 ESSENTIAL HYPERTENSION: ICD-10-CM

## 2019-09-16 DIAGNOSIS — I50.30 DIASTOLIC CONGESTIVE HEART FAILURE, UNSPECIFIED HF CHRONICITY (HCC): ICD-10-CM

## 2019-09-16 PROCEDURE — 99214 OFFICE O/P EST MOD 30 MIN: CPT | Performed by: INTERNAL MEDICINE

## 2019-09-16 PROCEDURE — 70450 CT HEAD/BRAIN W/O DYE: CPT

## 2019-09-16 PROCEDURE — 99213 OFFICE O/P EST LOW 20 MIN: CPT | Performed by: NURSE PRACTITIONER

## 2019-09-16 PROCEDURE — 93000 ELECTROCARDIOGRAM COMPLETE: CPT | Performed by: INTERNAL MEDICINE

## 2019-09-16 NOTE — PATIENT INSTRUCTIONS

## 2019-09-16 NOTE — PROGRESS NOTES
"  Chief complaint:   Chief Complaint   Patient presents with   • Head Injury     Madi had a CT scan this morning for follow up for a subdural hematoma, he is here for those results.         Subjective     HPI: This is a 79 y.o. male patient who went to the operating room on 6/16/2019 for a CRANIOTOMY FOR SUBDURAL HEMATOMA - Bilateral. The patient is here in follow up today for postoperative visit.  The patient did have to come to the hospital back in August for difficulty with breathing and lower extremity edema.  It was found that the patient was in congestive heart failure and did have atrial fibrillation as well as DVTs and PEs.  The patient comes in today and is doing much better.  He is not on any oxygen.  His leg swelling has gone down significantly.  He is not using go wheelchair to get around and is using a walker.  He is continued to complain of some right shoulder pain and is set to have an MRI done on Friday.  He comes in today for further evaluation regarding the subdural hematoma with a CT scan of his head.  He is not complaining of any headaches.  Denies any numbness and tingling or nausea or confusion        Review of Systems   Gastrointestinal: Negative for nausea.   Musculoskeletal: Positive for arthralgias and gait problem.   Neurological: Negative for headaches.         Objective      Vital Signs  /72 (BP Location: Right arm, Patient Position: Sitting)   Ht 172.7 cm (68\")   Wt 101 kg (222 lb)   BMI 33.75 kg/m²     Physical Exam   Constitutional: He is oriented to person, place, and time. He appears well-developed and well-nourished.   HENT:   Head: Normocephalic.   Eyes: EOM are normal. Pupils are equal, round, and reactive to light.   Neck: Normal range of motion.   Pulmonary/Chest: Effort normal.   Musculoskeletal:        Right shoulder: He exhibits decreased range of motion and pain.   Limited range of motion due to pain of the right shoulder   Neurological: He is alert and oriented " to person, place, and time. He has normal strength and normal reflexes. No cranial nerve deficit or sensory deficit. Gait abnormal. GCS eye subscore is 4. GCS verbal subscore is 5. GCS motor subscore is 6.   Skin: Skin is warm.   Psychiatric: He has a normal mood and affect. His speech is normal and behavior is normal. Thought content normal.         Results Review: CT scan of the head without contrast that was done on September 16, 2019 here at [facility] shows improvement from the scan that was done on August 13, 2019.  There is still some residual subdural hematoma present more prominent on the right.  No mass-effect or midline shift noted.  It is however significantly improved.  The area on the left is almost completely resolved          Assessment/Plan: The patient is doing well.  He is at home with home health.  He is making significant improvements compared to how he was a month ago.  He is can continue to follow-up with his cardiologist.  Once he has the MRI of the shoulder we can determine if anything needs to be done regarding his shoulder if any surgical intervention would be appropriate.  He is can continue with his current medications and we will see him again in 1 month with repeat CT scan of the head    Patient is a non-smoker  BMI shows the patient is Overweight. BMI chart was given to the patient.     Madi was seen today for head injury.    Diagnoses and all orders for this visit:    Bilateral subdural hematomas (CMS/HCC)  -     CT Head Without Contrast; Future    BMI 33.0-33.9,adult    Former smoker        I discussed the patients findings and my recommendations with patient  ALONDRA Richardson  09/16/19  10:57 AM

## 2019-09-16 NOTE — PROGRESS NOTES
Madi Mancini  [id number]  1940  79 y.o.  male    Referring Provider: RHETT Mcgee MD    Reason for  Visit: Here for routine follow up   congestive heart failure   subdural hematoma 5/8/19  Massive pedal edema    Subjective    Moderate chronic exertional shortness of breath on exertion relieved with rest  No significant cough or wheezing  Going on for several months or longer    No palpitations  No associated chest pain  Much less pedal edema    No fever or chills  No significant expectoration    No hemoptysis  No presyncope or syncope     Family here      History of present illness:  Madi Mancini is a 79 y.o. yo male with history of subdural hematoma s/p craniotomy  who presents today for   Chief Complaint   Patient presents with   • Congestive Heart Failure     6 week follow up    .    History  Past Medical History:   Diagnosis Date   • Atrial fibrillation (CMS/HCC)    • Elevated cholesterol    • Hypertension    ,   Past Surgical History:   Procedure Laterality Date   • APPENDECTOMY     • CRANIOTOMY FOR SUBDURAL HEMATOMA Bilateral 6/16/2019    Procedure: CRANIOTOMY FOR SUBDURAL HEMATOMA;  Surgeon: Galen Bhat MD;  Location: [facility] OR;  Service: Neurosurgery   • HEMORROIDECTOMY     • UMBILICAL HERNIA REPAIR     ,   Family History   Problem Relation Age of Onset   • Cancer Mother    • Cancer Father    • No Known Problems Sister    • No Known Problems Brother    • COPD Brother    • Cancer Brother    ,   Social History     Tobacco Use   • Smoking status: Former Smoker   • Smokeless tobacco: Former User     Types: Chew     Quit date: 2009   • Tobacco comment: quit at age 20   Substance Use Topics   • Alcohol use: No     Frequency: Never   • Drug use: No   ,     Medications  Current Outpatient Medications   Medication Sig Dispense Refill   • acetaminophen (TYLENOL) 325 MG tablet Take 650 mg by mouth Every 4 (Four) Hours As Needed for Mild Pain  or Fever.     • amLODIPine (NORVASC) 5 MG tablet Take 5 mg  by mouth 2 (Two) Times a Day.     • apixaban (ELIQUIS) 5 MG tablet tablet Take 1 tablet by mouth Every 12 (Twelve) Hours. 60 tablet 3   • bisacodyl (DULCOLAX) 10 MG suppository Insert 1 suppository into the rectum Daily As Needed for Constipation. 30 each 1   • digoxin (LANOXIN) 125 MCG tablet Take 1 tablet by mouth Daily.     • furosemide (LASIX) 40 MG tablet Take 40 mg by mouth 2 (Two) Times a Day.     • HYDROcodone-acetaminophen (NORCO) 5-325 MG per tablet Take 1 tablet by mouth Every 6 (Six) Hours As Needed for Moderate Pain . 12 tablet 0   • magnesium hydroxide (MILK OF MAGNESIA) 400 MG/5ML suspension Take 30 mL by mouth Daily As Needed for Constipation.     • melatonin 5 MG tablet tablet Take 10 mg by mouth Every Night.     • metoprolol tartrate (LOPRESSOR) 50 MG tablet Take 1 tablet by mouth Every 12 (Twelve) Hours.     • polyethylene glycol (MIRALAX) packet Take 17 g by mouth 2 (Two) Times a Day As Needed (constipation).     • rosuvastatin (CRESTOR) 20 MG tablet Take 20 mg by mouth Every Night.     • senna (SENOKOT) 8.6 MG tablet tablet Take 1 tablet by mouth Daily.     • tamoxifen (NOLVADEX) 10 MG tablet Take 20 mg by mouth Daily.     • vitamin D (ERGOCALCIFEROL) 13749 units capsule capsule Take 50,000 Units by mouth 1 (One) Time Per Week.     • lisinopril (PRINIVIL,ZESTRIL) 5 MG tablet Take 1 tablet by mouth Daily. 30 tablet 3     No current facility-administered medications for this visit.        Allergies:  Penicillins    Review of Systems  Review of Systems   Constitution: Positive for weakness and malaise/fatigue.   HENT: Negative.    Eyes: Negative.    Cardiovascular: Positive for dyspnea on exertion and leg swelling. Negative for chest pain, claudication, cyanosis, irregular heartbeat, near-syncope, orthopnea, palpitations, paroxysmal nocturnal dyspnea and syncope.   Respiratory: Negative.    Endocrine: Negative.    Hematologic/Lymphatic: Negative.    Skin: Negative.    Musculoskeletal: Positive for  "arthritis, back pain and joint pain.   Gastrointestinal: Negative for anorexia.   Genitourinary: Negative.    Psychiatric/Behavioral: Negative.        Objective     Physical Exam:  /72   Pulse 78   Ht 172.7 cm (68\")   Wt 101 kg (222 lb)   SpO2 99%   BMI 33.75 kg/m²     Physical Exam   Constitutional: He appears well-developed.   HENT:   Head: Normocephalic.   Neck: Normal carotid pulses and no JVD present. No tracheal tenderness present. Carotid bruit is not present. No tracheal deviation and no edema present.   Cardiovascular: Regular rhythm and normal pulses.   Murmur heard.   Systolic murmur is present with a grade of 3/6.  Pulmonary/Chest: Effort normal. No stridor.   Abdominal: Soft.     Vascular Status -  His right foot exhibits abnormal foot edema. His left foot exhibits abnormal foot edema.  Neurological: He is alert. He has normal strength. No cranial nerve deficit or sensory deficit.   Skin: Skin is warm.   Psychiatric: He has a normal mood and affect. His speech is normal and behavior is normal.       Results Review:    Results for orders placed during the hospital encounter of 08/05/19   Adult Transthoracic Echo Limited W/ Cont if Necessary Per Protocol    Narrative · Estimated EF = 65%.  · Left ventricular wall thickness is consistent with mild concentric   hypertrophy.  · Severe aortic valve stenosis is present.  · Mild pulmonary hypertension is present.  · Normal right ventricular cavity size, wall thickness, systolic function   and septal motion noted.  · RV to LV ratio <1                ECG 12 Lead  Date/Time: 9/16/2019 9:43 AM  Performed by: Jasen Holcomb MD  Authorized by: Jasen Holcomb MD   Comparison: compared with previous ECG from 8/5/2019  Comparison to previous ECG: Ventricular rate decreased from  91  to 68  beats per minute   Rhythm: atrial fibrillation  Rate: normal  Conduction: conduction normal  ST Segments: ST segments normal  T Waves: T waves normal  QRS axis: normal  Other " findings: non-specific ST-T wave changes    Clinical impression: abnormal EKG            Assessment/Plan   Madi was seen today for congestive heart failure.    Diagnoses and all orders for this visit:    Pedal edema    Former smoker    Essential hypertension    Congestive heart failure, unspecified HF chronicity, unspecified heart failure type (CMS/HCC)    Diastolic congestive heart failure, unspecified HF chronicity (CMS/HCC)    BMI 36.0-36.9,adult    Bilateral subdural hematomas (CMS/[facility])    Atrial fibrillation, chronic (CMS/[facility])    Other orders  -     ECG 12 Lead           Plan    The current medical regimen is effective;  continue present plan and medications.     Can stop Amlodine and Lisinopril     carotid endarterectomy    Check BP and heart rates twice daily at least 3x / week at home and bring a recording for me to review next visit  IF BP >135/85 call sooner     ____________________________________________________________________________________________________________________________________________  Health maintenance and recommendations    Similar recommendations as last visit     Offered to give patient  a copy of my notes       Questions were encouraged, asked and answered to the patient's  understanding and satisfaction. Questions if any regarding current medications and side effects, need for refills and importance of compliance to medications stressed.    Reviewed available prior notes, consults, prior visits, laboratory findings, radiology and cardiology relevant reports. Updated chart as applicable. I have reviewed the patient's medical history in detail and updated the computerized patient record as relevant.      Updated patient regarding any new or relevant abnormalities on review of records or any new findings on physical exam. Mentioned to patient about purpose of visit and desirable health short and long term goals and objectives.    Primary to monitor CBC CMP Lipid panel and TSH as  applicable    ___________________________________________________________________________________________________________________________________________        Dr Steele in [place]   Return in about 2 months (around 11/16/2019).

## 2019-09-20 ENCOUNTER — HOSPITAL ENCOUNTER (OUTPATIENT)
Dept: MRI IMAGING | Facility: HOSPITAL | Age: 79
Discharge: HOME | End: 2019-09-20

## 2019-09-20 DIAGNOSIS — M25.511 CHRONIC RIGHT SHOULDER PAIN: ICD-10-CM

## 2019-09-20 DIAGNOSIS — M75.101 TEAR OF RIGHT ROTATOR CUFF, UNSPECIFIED TEAR EXTENT: ICD-10-CM

## 2019-09-20 DIAGNOSIS — G89.29 CHRONIC RIGHT SHOULDER PAIN: ICD-10-CM

## 2019-09-27 ENCOUNTER — TELEPHONE (OUTPATIENT)
Dept: CARDIOLOGY | Facility: CLINIC | Age: 79
End: 2019-09-27

## 2019-09-27 NOTE — TELEPHONE ENCOUNTER
This pt neighbor/friend Oliverio Cabezas called inquiring about a heart monitor ordered by Dr. Holcomb. Pt was seen back on 9/16 and was mentioned for pt to wear a heart monitor. I do not see an order. Pt was also transitioning care to Dr. Steele in [place] with appt on 11/7/19.  Does Dr. Holcomb needs a holter place? If so, please place an order.

## 2019-10-17 ENCOUNTER — HOSPITAL ENCOUNTER (OUTPATIENT)
Dept: CT IMAGING | Facility: HOSPITAL | Age: 79
Discharge: HOME | End: 2019-10-17
Admitting: NURSE PRACTITIONER

## 2019-10-17 ENCOUNTER — OFFICE VISIT (OUTPATIENT)
Dept: NEUROSURGERY | Facility: CLINIC | Age: 79
End: 2019-10-17

## 2019-10-17 ENCOUNTER — TELEPHONE (OUTPATIENT)
Dept: CARDIOLOGY | Facility: CLINIC | Age: 79
End: 2019-10-17

## 2019-10-17 VITALS — WEIGHT: 222.8 LBS | DIASTOLIC BLOOD PRESSURE: 68 MMHG | BODY MASS INDEX: 33.88 KG/M2 | SYSTOLIC BLOOD PRESSURE: 112 MMHG

## 2019-10-17 DIAGNOSIS — S06.5XAA BILATERAL SUBDURAL HEMATOMAS (HCC): ICD-10-CM

## 2019-10-17 DIAGNOSIS — Z87.891 FORMER SMOKER: ICD-10-CM

## 2019-10-17 DIAGNOSIS — S06.5XAA BILATERAL SUBDURAL HEMATOMAS (HCC): Primary | ICD-10-CM

## 2019-10-17 PROCEDURE — 70450 CT HEAD/BRAIN W/O DYE: CPT

## 2019-10-17 PROCEDURE — 99213 OFFICE O/P EST LOW 20 MIN: CPT | Performed by: NURSE PRACTITIONER

## 2019-10-17 NOTE — TELEPHONE ENCOUNTER
RECEIVED A REQUEST FOR CARDIAC CLEARANCE FROM [facility]. PT IS HAVING A RIGHT REVERSE TSA ON 11/5/19.    PT LOV 9/2019 - HX OF AFIB (ON ELIQUIS) - RECENT HOLTER & ECHO     HE IS GOING TO SEE DR ANTONY IN THE NEAR FUTURE, BUT NOT UNTIL AFTER THIS SURGERY   PLEASE ADVISE

## 2019-10-17 NOTE — PROGRESS NOTES
Chief complaint:   Chief Complaint   Patient presents with   • Subdural hematoma     Madi is returning after a CT scan of his head for follow up for his subdural hematoma.  He also needs a clearance for Dr. Sanchez for his right shoulder surgery coming up in November from a neurological statnd point.         Subjective     HPI: This is a 79 y.o. male patient who went to the operating room on 6/16/2019 for a bilateral craniotomy for subdural hematoma.  He is here in follow-up today.  The patient was admitted in the hospital back in August and it was found that he did have DVTs and PEs.  He was placed on a blood thinner.  He states he does feel he is doing better.  He is still having an issue with his right shoulder and has met with an orthopedic surgeon and it is felt that the patient has a surgical issue in the shoulder.  Is not complaining of any headaches.  Denies any nausea vomiting.  He is ambulating with a walker.  Overall though he does feel he is doing very well and is happy and satisfied with where he is out from his subdural hematoma    Review of Systems   Musculoskeletal: Negative.    Neurological: Positive for weakness.   Psychiatric/Behavioral: Negative.          Objective      Vital Signs  /68 (BP Location: Left arm, Patient Position: Sitting)   Wt 101 kg (222 lb 12.8 oz)   BMI 33.88 kg/m²     Physical Exam   Constitutional: He is oriented to person, place, and time. He appears well-developed and well-nourished.   HENT:   Head: Normocephalic.   Eyes: EOM are normal. Pupils are equal, round, and reactive to light.   Neck: Normal range of motion.   Pulmonary/Chest: Effort normal.   Musculoskeletal:        Right shoulder: He exhibits decreased range of motion and pain.   Limited range of motion due to pain of the right shoulder   Neurological: He is alert and oriented to person, place, and time. He has normal strength and normal reflexes. No cranial nerve deficit or sensory deficit. Gait  abnormal. GCS eye subscore is 4. GCS verbal subscore is 5. GCS motor subscore is 6.   Skin: Skin is warm.   Psychiatric: He has a normal mood and affect. His speech is normal and behavior is normal. Thought content normal.       Results Review: CT scan of the head was reviewed by Dr. Bhat and near complete resolution of the bilateral subdural hematomas.        Assessment/Plan: At this point the patient is doing good from a neurosurgical standpoint.  He no longer requires any imaging.  At this point he is stable to be seen on as-needed basis.  He can continue following with his cardiologist along with medical doctor for his other medical needs at this time.  From a neurosurgical standpoint the patient is okay to undergo any surgical procedure deemed necessary.    Patient is a non-smoker  BMI shows the patient is Obese. BMI chart was given to the patient.     Madi was seen today for subdural hematoma.    Diagnoses and all orders for this visit:    Bilateral subdural hematomas (CMS/HCC)    Former smoker    BMI 33.0-33.9,adult        I discussed the patients findings and my recommendations with patient  Marco A Maya, ALONDRA  10/17/19  3:10 PM

## 2019-10-17 NOTE — PATIENT INSTRUCTIONS

## 2019-10-18 NOTE — TELEPHONE ENCOUNTER
DR. TORRES MESA WOULD LIKE DR ANTONY TO ADVISE ON PATIENTS REQUEST FOR CARDIAC CLEARANCE.    SEE NOTES BELOW

## 2019-10-24 ENCOUNTER — TELEPHONE (OUTPATIENT)
Dept: CARDIOLOGY | Facility: CLINIC | Age: 79
End: 2019-10-24

## 2019-10-24 NOTE — TELEPHONE ENCOUNTER
PT IS NEEDING CARDIAC CLEARANCE FOR SURGERY ON NOV 5 TH HE HAD APT ON 10/23/2019, BUT WE HAD TO CANCEL DUE TO NO INTERNET. WOULD YOU PLEASE PROVIDE LETTER OF CLEARANCE BASED ON MEDICAL HISTORY IF POSSIBLE.

## 2019-10-25 NOTE — TELEPHONE ENCOUNTER
Difficult to assess without ever having seen the patient.  Please provide more information on whether or not patient is having any current cardiac symptoms.  What his functional capacity is.  And what type of surgery he is planning on having.  If he is having any cardiac symptoms we will need to try to get him back in for an appointment prior to giving any sort of preoperative risk assessment.

## 2019-10-29 ENCOUNTER — TELEPHONE (OUTPATIENT)
Dept: CARDIOLOGY | Facility: CLINIC | Age: 79
End: 2019-10-29

## 2019-10-29 NOTE — TELEPHONE ENCOUNTER
SELMA I NEED HELP DOING THIS. NOT SURE WHAT DR. ANTONY IS TALKING ABOUT ARE WHO TO OBTAIN THE INFORMATION FROM

## 2019-10-29 NOTE — TELEPHONE ENCOUNTER
Pt is having Right total shoulder surgery on 11/5/19 in [place] and needs clearance because he is on Plavix. He is having no symptoms of cardiac issues at this time, please advise?

## 2019-10-30 NOTE — TELEPHONE ENCOUNTER
Left message for pt, called [facility] and spoke with Elizabeth and advise that Dr. Steele has never show pt to clear for surgery. We can not reach pt for appt.

## 2019-10-30 NOTE — TELEPHONE ENCOUNTER
ATTEMPTED TO CALL PT TO OFFER APPT FOR 10/31/2019 @11:00 NO ANSWER AND MAILBOX FULL   CALLED LEFT MESSAGE ON DAUGHTER'S MACHINE TO HAVE PT CALL THE OFFICE TO SCHEDULE APPT ON 10/31/2019 @11   MEDICAL ASSISTANT GERTRUDIS CALLED ORTHO TO LET THEM KNOW THAT WE HAVE ATTEMPTED TO CALL PT TO SCHEDULE APPT BUT COULD NOT REACH HIM BUT PT WOULD NEED TO BE SEEN BEFORE CLEARED FOR SURGERY

## 2019-11-01 ENCOUNTER — TELEPHONE (OUTPATIENT)
Dept: CARDIOLOGY | Facility: CLINIC | Age: 79
End: 2019-11-01

## 2019-11-01 NOTE — TELEPHONE ENCOUNTER
Will need to be seen as bilateral pulmonary embolism   I can see in [place] next Friday if unable to see in [place]

## 2019-11-01 NOTE — TELEPHONE ENCOUNTER
Difficult for me to assess as I did nursing the patient before.  Since Dr. Holcomb has recently seen him in September, I will forward to him to see if he can offer operative risk assessment prior to his upcoming orthopedic surgery.

## 2019-11-01 NOTE — TELEPHONE ENCOUNTER
Pt needs to be cleared for surgery he has seen dr maradiaga in the past and was told he could be seen here in our office by Dr Steele due to pt being closer to our office pt had appt on 10/24/2019 but computers were down at our location. Our office has tried several attempts to reach pt to try and reschedule pt's voice mail full /tried to call daughter as well left message for pt to call office orthopedics will not do surgery until pt is cleared please advise

## 2019-11-04 NOTE — TELEPHONE ENCOUNTER
ALANA PLACED SEVERAL CALLS TO MR [facility]ACOCK IN REGARDS TO WORKING HIM IN TO SEE DR TORRES YOUNG. THE ONLY TIME I WAS ABLE TO GET HIM ON THE PHONE, THE LINE WAS CUTTING IN & OUT & THEN WE WE WERE DISCONNECTED. VOICEMAIL WAS FULL.    HIS SX IS SCHEDULED FOR 11/5/19 - SO I DO NOT FORSEE HIM OBTAINING CARDIAC CLEARANCE.     PLACED A CALL TO [facility] - THEY WERE ABLE TO REACH PT TODAY & SCHEDULE HIM FOR 12/19. THEY SPOKE WITH FLAKITA PARIKH. PATIENTS SURGERY HAS BEEN RESCHEDULED FOR JAN.

## 2019-11-04 NOTE — TELEPHONE ENCOUNTER
Dr Holcomb can see him in [place] on Friday if we can't get him in down in [place] with me or Val sooner.

## 2020-12-01 NOTE — PROGRESS NOTES
"Madi Mancini  79 y.o.      Chief complaint:   No complaints    Subjective:  Symptoms:  Stable.    Diet:  Adequate intake.    Activity level: Impaired due to weakness.    Pain:  He reports no pain.      No events    Temp:  [97.5 °F (36.4 °C)-100.3 °F (37.9 °C)] 98 °F (36.7 °C)  Heart Rate:  [58-90] 72  Resp:  [18-22] 20  BP: ()/(47-72) 98/60      Objective:  General Appearance:  Comfortable, well-appearing, in no acute distress and not in pain.    Vital signs: (most recent): Blood pressure 98/60, pulse 72, temperature 98 °F (36.7 °C), temperature source Oral, resp. rate 20, height 172.7 cm (67.99\"), weight 107 kg (236 lb 4 oz), SpO2 95 %.  Vital signs are normal.  No fever.    Output: Producing urine.    HEENT: Normal HEENT exam.    Lungs:  Normal effort and normal respiratory rate.  He is not in respiratory distress.  (Patient has oxygen on per nasal cannula)  Heart: Normal rate.  Regular rhythm.    Chest: Symmetric chest wall expansion.   Extremities: There is local swelling.    Skin:  Warm and dry.    Abdomen: Abdomen is soft.  Bowel sounds are normal.     Pulses: Distal pulses are intact.        Neurologic Exam     Mental Status   Oriented to person, place, and time.   Attention: normal. Concentration: normal.   Speech: speech is normal   Level of consciousness: alert    Cranial Nerves   Cranial nerves II through XII intact.     Motor Exam   Muscle bulk: normal    Strength   Strength 5/5 throughout. Limited use of right arm due to right shoulder pain     Sensory Exam   Light touch normal.         CHF (congestive heart failure) (CMS/HCC)    Congestive cardiac failure (CMS/HCC)      Lab Results (last 24 hours)     Procedure Component Value Units Date/Time    Basic Metabolic Panel [[id number]]  (Abnormal) Collected:  08/07/19 0444    Specimen:  Blood Updated:  08/07/19 0537     Glucose 117 mg/dL      BUN 30 mg/dL      Creatinine 0.91 mg/dL      Sodium 131 mmol/L      Potassium 3.7 mmol/L      Chloride 91 " mmol/L      CO2 32.0 mmol/L      Calcium 8.3 mg/dL      eGFR Non African Amer 80 mL/min/1.73      BUN/Creatinine Ratio 33.0     Anion Gap 8.0 mmol/L     Narrative:       GFR Normal >60  Chronic Kidney Disease <60  Kidney Failure <15              Plan:   Patient had a 2D echo done yesterday.  Results pending.  Patient was started on Lovenox 50 mg twice a day yesterday.  We will repeat the CT scan of the head to ensure that the subdural hematoma is not progressing or getting worse.  Neurologically the patient does appear to be intact and doing well at this time.  Continue with cardiology care at this time    Marco A Maya, APRN     no

## 2022-07-18 NOTE — SIGNIFICANT NOTE
PT and OT indicated that the patient seemed to be very drowsy and would not participate well with therapy.  The assessment indicated that the patient was completely disoriented and the patient was much more drowsy than the previous assessment.  The patient would wake up, squeeze hands, and wiggle toes.  Plantarflexion and dorsiflexion commands were not followed.  Repeated stimulation was needed to keep the patient awake but he would still fall asleep easily.  It was reported that the patient did not sleep well last night.  Dr. Bhat to be notified of this change.  Will reassess and continue to monitor.    Island Pedicle Flap-Requiring Vessel Identification Text: The defect edges were debeveled with a #15 scalpel blade.  Given the location of the defect, shape of the defect and the proximity to free margins an island pedicle advancement flap was deemed most appropriate.  Using a sterile surgical marker, an appropriate advancement flap was drawn, based on the axial vessel mentioned above, incorporating the defect, outlining the appropriate donor tissue and placing the expected incisions within the relaxed skin tension lines where possible.    The area thus outlined was incised deep to adipose tissue with a #15 scalpel blade.  The skin margins were undermined to an appropriate distance in all directions around the primary defect and laterally outward around the island pedicle utilizing iris scissors.  There was minimal undermining beneath the pedicle flap.

## 2022-09-14 NOTE — NURSING NOTE
ALONDRA Strickland called about drains not holding suction.  This occurred directly after an agitation episode and there was possibly concern for the drain being pulled on.  Macro A stated to watch them and we will readdress them in the morning.  No orders given.     [Seen by urologist before (Name)  ___] : Preciously seen by a urologist: [unfilled] [Pain during urination] : pain during urination [Wake up at night to urinate  How many times?  ___] : wakes up to urinate [unfilled] times during the night [Bladder fullness after urinating] : bladder fullness after urinating [Negative] : Heme/Lymph

## (undated) DEVICE — GLV SURG BIOGEL LTX PF 6 1/2

## (undated) DEVICE — PROXIMATE RH ROTATING HEAD SKIN STAPLERS (35 REGULAR) CONTAINS 35 STAINLESS STEEL STAPLES: Brand: PROXIMATE

## (undated) DEVICE — ELECTRD BLD EDGE/INSUL1P 2.4X5.1MM STRL

## (undated) DEVICE — 2.3MM TAPERED ROUTER

## (undated) DEVICE — CONN FLX BREATHE CIRCT

## (undated) DEVICE — DRSNG GZ PETROLTM CURAD 3X9IN STRL

## (undated) DEVICE — KIT CG8901 CLIP GUN 10 PK: Brand: CLIP GUN

## (undated) DEVICE — CLTH CLENS READYCLEANSE PERI CARE PK/5

## (undated) DEVICE — KT DRN EVAC WND PVC PCH WTROC RND 10F400

## (undated) DEVICE — GLV SURG TRIUMPH GREEN W/ALOE PF LTX 8 STRL

## (undated) DEVICE — Device: Brand: IQ SYSTEM

## (undated) DEVICE — DECANTER: Brand: UNBRANDED

## (undated) DEVICE — CATH IV ANGIO FEP 12G 3IN LTBLU 10PK

## (undated) DEVICE — GLV SURG BIOGEL LTX PF 7 1/2

## (undated) DEVICE — SKIN AFFIX SURG ADHESIVE 72/CS 0.55ML: Brand: MEDLINE

## (undated) DEVICE — PK CRANI 30

## (undated) DEVICE — DERMAHOOK 1/2HOOK PK/6

## (undated) DEVICE — SUT NUROLON 4/0 TF18 CR8 I8IN C584D

## (undated) DEVICE — PK TURNOVER RM ADV

## (undated) DEVICE — BANDAGE,GAUZE,BULKEE II,4.5"X4.1YD,STRL: Brand: MEDLINE

## (undated) DEVICE — DURAHOOK 1/4HOOK PK/6

## (undated) DEVICE — 3M™ STERI-DRAPE™ CRANIOTOMY DRAPE WITH IOBAN™ 2 INCISE POUCH 6687: Brand: STERI-DRAPE™ IOBAN™ 2

## (undated) DEVICE — ANTIBACTERIAL UNDYED BRAIDED (POLYGLACTIN 910), SYNTHETIC ABSORBABLE SUTURE: Brand: COATED VICRYL

## (undated) DEVICE — DRN JP RND NO TROC SIL 10F 1/8IN

## (undated) DEVICE — SUT MNCRYL 4/0 PS2 27IN UD MCP426H